# Patient Record
Sex: FEMALE | Race: WHITE | Employment: UNEMPLOYED | URBAN - METROPOLITAN AREA
[De-identification: names, ages, dates, MRNs, and addresses within clinical notes are randomized per-mention and may not be internally consistent; named-entity substitution may affect disease eponyms.]

---

## 2017-03-01 ENCOUNTER — OFFICE VISIT (OUTPATIENT)
Dept: BEHAVIORAL/MENTAL HEALTH CLINIC | Age: 22
End: 2017-03-01

## 2017-03-01 DIAGNOSIS — F39 MOOD DISORDER (HCC): ICD-10-CM

## 2017-03-01 DIAGNOSIS — F90.2 ATTENTION DEFICIT HYPERACTIVITY DISORDER (ADHD), COMBINED TYPE: Primary | ICD-10-CM

## 2017-03-01 DIAGNOSIS — F41.9 ANXIETY DISORDER, UNSPECIFIED TYPE: ICD-10-CM

## 2017-03-01 RX ORDER — FLUOXETINE HYDROCHLORIDE 20 MG/1
CAPSULE ORAL
Qty: 30 CAP | Refills: 2 | Status: SHIPPED | OUTPATIENT
Start: 2017-03-01 | End: 2017-06-02 | Stop reason: SDUPTHER

## 2017-03-01 RX ORDER — DEXTROAMPHETAMINE SACCHARATE, AMPHETAMINE ASPARTATE, DEXTROAMPHETAMINE SULFATE AND AMPHETAMINE SULFATE 7.5; 7.5; 7.5; 7.5 MG/1; MG/1; MG/1; MG/1
30 TABLET ORAL 2 TIMES DAILY
Qty: 60 TAB | Refills: 0 | Status: SHIPPED | OUTPATIENT
Start: 2017-04-30 | End: 2017-05-30

## 2017-03-01 RX ORDER — DEXTROAMPHETAMINE SACCHARATE, AMPHETAMINE ASPARTATE, DEXTROAMPHETAMINE SULFATE AND AMPHETAMINE SULFATE 7.5; 7.5; 7.5; 7.5 MG/1; MG/1; MG/1; MG/1
30 TABLET ORAL 2 TIMES DAILY
Qty: 60 TAB | Refills: 0 | Status: SHIPPED | OUTPATIENT
Start: 2017-03-31 | End: 2017-04-30

## 2017-03-01 RX ORDER — DEXTROAMPHETAMINE SACCHARATE, AMPHETAMINE ASPARTATE, DEXTROAMPHETAMINE SULFATE AND AMPHETAMINE SULFATE 7.5; 7.5; 7.5; 7.5 MG/1; MG/1; MG/1; MG/1
30 TABLET ORAL 2 TIMES DAILY
Qty: 60 TAB | Refills: 0 | Status: SHIPPED | OUTPATIENT
Start: 2017-03-01 | End: 2017-03-31

## 2017-03-01 RX ORDER — LORAZEPAM 0.5 MG/1
0.5 TABLET ORAL
Qty: 60 TAB | Refills: 3 | Status: SHIPPED | OUTPATIENT
Start: 2017-03-01 | End: 2017-03-31

## 2017-03-01 NOTE — MR AVS SNAPSHOT
Visit Information Date & Time Provider Department Dept. Phone Encounter #  
 3/1/2017  1:30 PM Deepa Vinson  [facility] [phone] [id number] Upcoming Health Maintenance Date Due  
 HPV AGE 9Y-34Y (1 of 3 - Female 3 Dose Series) 12/17/2004 Pneumococcal 19-64 Medium Risk (1 of 1 - PPSV23) 12/17/2012 DTaP/Tdap/Td series (1 - Tdap) 12/17/2014 PAP AKA CERVICAL CYTOLOGY 12/17/2014 INFLUENZA AGE 9 TO ADULT 8/1/2016 Allergies as of 3/1/2017  Review Complete On: 3/1/2017 By: Deepa Vinson MD  
  
 Severity Noted Reaction Type Reactions Hydrocodone  12/01/2016    Itching Current Immunizations  Reviewed on 6/11/2014 Name Date Influenza Vaccine 11/15/2013 Not reviewed this visit You Were Diagnosed With   
  
 Codes Comments Attention deficit hyperactivity disorder (ADHD), combined type    -  Primary ICD-10-CM: F90.2 ICD-9-CM: 314.01 Mood disorder ([facility]     ICD-10-CM: F39 
ICD-9-CM: 296.90 Anxiety disorder, unspecified type     ICD-10-CM: F41.9 ICD-9-CM: 300.00 Vitals OB Status Having regular periods Preferred Pharmacy Pharmacy Name Phone [facility] [address] [place] [address] [phone] Your Updated Medication List  
  
   
This list is accurate as of: 3/1/17  2:01 PM.  Always use your most recent med list.  
  
  
  
  
 * dextroamphetamine-amphetamine 30 mg tablet Commonly known as:  ADDERALL Take 1 Tab by mouth two (2) times a dayIndications: ATTENTION-DEFICIT HYPERACTIVITY DISORDER. Take second dose after lunch Max Daily Amount: 2 Tabs * dextroamphetamine-amphetamine 30 mg tablet Commonly known as:  ADDERALL Take 1 Tab by mouth two (2) times a dayIndications: ATTENTION-DEFICIT HYPERACTIVITY DISORDER Earliest Fill Date: 3/31/17. Take second dose after lunch Max Daily Amount: 2 Tabs Start taking on:  3/31/2017 * dextroamphetamine-amphetamine 30 mg tablet Commonly known as:  ADDERALL Take 1 Tab by mouth two (2) times a dayIndications: ATTENTION-DEFICIT HYPERACTIVITY DISORDER Earliest Fill Date: 4/30/17. Take second dose after lunch Max Daily Amount: 2 Tabs Start taking on:  4/30/2017 FISH OIL 1,000 mg Cap Generic drug:  omega-3 fatty acids-vitamin e Take 1 Cap by mouth. FLUoxetine 20 mg capsule Commonly known as:  PROzac TAKE ONE CAPSULE BY MOUTH EVERY DAY  Indications: ANXIETY WITH DEPRESSION IMPLANON 68 mg Impl Generic drug:  etonogestrel  
by SubDERmal route. LORazepam 0.5 mg tablet Commonly known as:  ATIVAN Take 1 Tab by mouth two (2) times daily as needed for Anxiety for up to 30 days. Max Daily Amount: 1 mg. Indications: ANXIETY  
  
 naproxen sodium 220 mg tablet Commonly known as:  NAPROSYN Take 220 mg by mouth two (2) times daily (with meals). * Notice: This list has 3 medication(s) that are the same as other medications prescribed for you. Read the directions carefully, and ask your doctor or other care provider to review them with you. Prescriptions Printed Refills FLUoxetine (PROZAC) 20 mg capsule 2 Sig: TAKE ONE CAPSULE BY MOUTH EVERY DAY  Indications: ANXIETY WITH DEPRESSION Class: Print LORazepam (ATIVAN) 0.5 mg tablet 3 Sig: Take 1 Tab by mouth two (2) times daily as needed for Anxiety for up to 30 days. Max Daily Amount: 1 mg. Indications: ANXIETY Class: Print Route: Oral  
 dextroamphetamine-amphetamine (ADDERALL) 30 mg tablet 0 Sig: Take 1 Tab by mouth two (2) times a dayIndications: ATTENTION-DEFICIT HYPERACTIVITY DISORDER. Take second dose after lunch Max Daily Amount: 2 Tabs Class: Print Route: Oral  
 dextroamphetamine-amphetamine (ADDERALL) 30 mg tablet 0 Starting on: 3/31/2017  Sig: Take 1 Tab by mouth two (2) times a dayIndications: ATTENTION-DEFICIT HYPERACTIVITY DISORDER Earliest Fill Date: 3/31/17. Take second dose after lunch Max Daily Amount: 2 Tabs Class: Print Route: Oral  
 dextroamphetamine-amphetamine (ADDERALL) 30 mg tablet 0 Starting on: 4/30/2017 Sig: Take 1 Tab by mouth two (2) times a dayIndications: ATTENTION-DEFICIT HYPERACTIVITY DISORDER Earliest Fill Date: 4/30/17. Take second dose after lunch Max Daily Amount: 2 Tabs Class: Print Route: Oral  
  
Introducing [facility] & [facility] SERVICES! Rolettemadison Farrob introduces [employer] patient portal. Now you can access parts of your medical record, email your doctor's office, and request medication refills online. 1. In your internet browser, go to [email or web address] 2. Click on the First Time User? Click Here link in the Sign In box. You will see the New Member Sign Up page. 3. Enter your [employer] Access Code exactly as it appears below. You will not need to use this code after youve completed the sign-up process. If you do not sign up before the expiration date, you must request a new code. · [employer] Access Code: [email or web address] Expires: 3/1/2017  2:37 PM 
 
4. Enter the last four digits of your Social Security Number (xxxx) and Date of Birth (mm/dd/yyyy) as indicated and click Submit. You will be taken to the next sign-up page. 5. Create a [employer] ID. This will be your [employer] login ID and cannot be changed, so think of one that is secure and easy to remember. 6. Create a [employer] password. You can change your password at any time. 7. Enter your Password Reset Question and Answer. This can be used at a later time if you forget your password. 8. Enter your e-mail address. You will receive e-mail notification when new information is available in [address] 9. Click Sign Up. You can now view and download portions of your medical record. 10. Click the Download Summary menu link to download a portable copy of your medical information. If you have questions, please visit the Frequently Asked Questions section of the [employer] website. Remember, [employer] is NOT to be used for urgent needs. For medical emergencies, dial 911. Now available from your iPhone and Android! Please provide this summary of care documentation to your next provider. Your primary care clinician is listed as Sarahi Peterson. If you have any questions after today's visit, please call [phone].

## 2017-03-01 NOTE — PROGRESS NOTES
Psychiatric Progress Note    Date: 3/1/2017  Account Number:  [id number]  Name: Naina Vizcaino    Length of psychotherapy session: 16 minutes     Total Patient Care Time Spent: 20 minutes : (Coordinated care:  counseling time with patient, individual psychotherapy with patient; discussions with family members and chart review). SUBJECTIVE:   Naina Vizcaino  is a 19 y.o.  female  patient presents for a therapy/psychopharmacological management appointment. Pt reports doing well. She reports moving into a rent to own house which she is currently renovating. 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 She reportedly painted the whole house and made some structural changes in the house as well. 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 Pt states that so far she is working alone but in future she planst to hire people to work for her. Financially she is getting stable. Provided support and encouragement to pt. Pt reports doing well on her current meds. She reportedly missed her Prozac for > a week, states she started to feel depressed, and restarted the medicine, now taking double the dose, recommended going back to her previous dose and reinforced compliance. Pt's daughter lives with her and spends a night with her dad and his family, pt still feels sad about the separation with her , states she loves him and wishes that he could take care of his mental illness so she could get back with him. Patient denies SI/HI/SIB. No evidence of AH/VH or delusions.       Appetite: improved  Sleep: no change     Response to Treatment: Positive   Side Effects: none      Supportive/Cognitive/Reality-Oriented psychotherapy provided in regards to psychosocial stressors:   pre-admission and current problems   Housing issues   Occupational issues   Academic issues   Legal issues   Medical issues   Interpersonal conflicts   Stress of hospitalization  Psychoeducation provided  Treatment plan reviewed with patient-including diagnosis and medications  Worked on issues of denial & effects of substance dependency/use      OBJECTIVE:                 Mental Status exam: WNL except for      Sensorium  oriented to time, place and person   Relations cooperative    Eye Contact    appropriate   Appearance:  age appropriate, well dressed and well groomed, bright affect   Motor Behavior:  within normal limits   Speech:  normal pitch and normal volume   Thought Process: organized and goal directed   Thought Content free of delusions and free of hallucinations   Suicidal ideations none   Homicidal ideations none   Mood:  Good    Affect:  Stable/bright/content    Memory recent  adequate   Memory remote:  adequate   Concentration:  adequate   Abstraction:  abstract   Insight:  fair   Reliability fair   Judgment:  fair       Allergies   Allergen Reactions    Hydrocodone Itching        Current Outpatient Prescriptions   Medication Sig Dispense Refill    FLUoxetine (PROZAC) 20 mg capsule TAKE ONE CAPSULE BY MOUTH EVERY DAY  Indications: ANXIETY WITH DEPRESSION 30 Cap 2    LORazepam (ATIVAN) 0.5 mg tablet Take 1 Tab by mouth two (2) times daily as needed for Anxiety for up to 30 days. Max Daily Amount: 1 mg. Indications: ANXIETY 60 Tab 3    dextroamphetamine-amphetamine (ADDERALL) 30 mg tablet Take 1 Tab by mouth two (2) times a dayIndications: ATTENTION-DEFICIT HYPERACTIVITY DISORDER. Take second dose after lunch Max Daily Amount: 2 Tabs 60 Tab 0    [START ON 3/31/2017] dextroamphetamine-amphetamine (ADDERALL) 30 mg tablet Take 1 Tab by mouth two (2) times a dayIndications: ATTENTION-DEFICIT HYPERACTIVITY DISORDER Earliest Fill Date: 3/31/17.   Take second dose after lunch Max Daily Amount: 2 Tabs 60 Tab 0    [START ON 4/30/2017] dextroamphetamine-amphetamine (ADDERALL) 30 mg tablet Take 1 Tab by mouth two (2) times a dayIndications: ATTENTION-DEFICIT HYPERACTIVITY DISORDER Earliest Fill Date: 4/30/17. Take second dose after lunch Max Daily Amount: 2 Tabs 60 Tab 0    naproxen sodium (NAPROSYN) 220 mg tablet Take 220 mg by mouth two (2) times daily (with meals).  omega-3 fatty acids-vitamin e (FISH OIL) 1,000 mg cap Take 1 Cap by mouth.  etonogestrel (IMPLANON) 68 mg impl by SubDERmal route. Medication Changes/Adjustments:   Medications Discontinued During This Encounter   Medication Reason    FLUoxetine (PROZAC) 20 mg capsule Reorder    LORazepam (ATIVAN) 0.5 mg tablet Reorder    dextroamphetamine-amphetamine (ADDERALL) 30 mg tablet Reorder          ASSESSMENT:  Nicole Bañuelos  is a 19 y.o.  female patient presented for her f/u appointment. 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 She did miss her Prozac for more than a week but restarted it as felt depressed. Reinforced med compliance. PSYCHOTHERAPY: Provided encouragement and supportive psychotherapy, discussed the importance of continuing healthy habits and medication compliance. Diagnoses:   Axis I: Mood disorder  ADHD  Anxiety disorder  Axis II: Deferred  Axis III: None reported  Axis IV: Moderate  Axis V:  75-80    RECOMMENDATIONS/PLAN:   1. Medications: Medications reviewed, continue with the current meds. Orders Placed This Encounter    FLUoxetine (PROZAC) 20 mg capsule    LORazepam (ATIVAN) 0.5 mg tablet    dextroamphetamine-amphetamine (ADDERALL) 30 mg tablet    dextroamphetamine-amphetamine (ADDERALL) 30 mg tablet    dextroamphetamine-amphetamine (ADDERALL) 30 mg tablet      2. Follow-up Disposition:  Return in about 3 months (around 6/1/2017).

## 2017-06-02 RX ORDER — LORAZEPAM 0.5 MG/1
TABLET ORAL
Refills: 3 | COMMUNITY
Start: 2017-05-09 | End: 2017-06-02 | Stop reason: SDUPTHER

## 2017-06-02 RX ORDER — FLUOXETINE HYDROCHLORIDE 20 MG/1
CAPSULE ORAL
Qty: 30 CAP | Refills: 0 | Status: SHIPPED | OUTPATIENT
Start: 2017-06-02 | End: 2017-07-13

## 2017-06-02 RX ORDER — DEXTROAMPHETAMINE SACCHARATE, AMPHETAMINE ASPARTATE, DEXTROAMPHETAMINE SULFATE AND AMPHETAMINE SULFATE 7.5; 7.5; 7.5; 7.5 MG/1; MG/1; MG/1; MG/1
30 TABLET ORAL 2 TIMES DAILY
Qty: 10 TAB | Refills: 0 | Status: SHIPPED | OUTPATIENT
Start: 2017-06-02 | End: 2017-07-13 | Stop reason: SDUPTHER

## 2017-06-02 RX ORDER — LORAZEPAM 0.5 MG/1
TABLET ORAL
Qty: 60 TAB | Refills: 0 | Status: SHIPPED | OUTPATIENT
Start: 2017-06-02 | End: 2017-07-13 | Stop reason: SDUPTHER

## 2017-07-13 ENCOUNTER — OFFICE VISIT (OUTPATIENT)
Dept: BEHAVIORAL/MENTAL HEALTH CLINIC | Age: 22
End: 2017-07-13

## 2017-07-13 DIAGNOSIS — F41.9 ANXIETY DISORDER, UNSPECIFIED TYPE: ICD-10-CM

## 2017-07-13 DIAGNOSIS — F90.2 ATTENTION DEFICIT HYPERACTIVITY DISORDER (ADHD), COMBINED TYPE: ICD-10-CM

## 2017-07-13 DIAGNOSIS — F39 MOOD DISORDER (HCC): Primary | ICD-10-CM

## 2017-07-13 RX ORDER — DEXTROAMPHETAMINE SACCHARATE, AMPHETAMINE ASPARTATE, DEXTROAMPHETAMINE SULFATE AND AMPHETAMINE SULFATE 7.5; 7.5; 7.5; 7.5 MG/1; MG/1; MG/1; MG/1
30 TABLET ORAL 2 TIMES DAILY
Qty: 60 TAB | Refills: 0 | Status: SHIPPED | OUTPATIENT
Start: 2017-09-11 | End: 2017-10-04 | Stop reason: SDUPTHER

## 2017-07-13 RX ORDER — DEXTROAMPHETAMINE SACCHARATE, AMPHETAMINE ASPARTATE, DEXTROAMPHETAMINE SULFATE AND AMPHETAMINE SULFATE 7.5; 7.5; 7.5; 7.5 MG/1; MG/1; MG/1; MG/1
30 TABLET ORAL 2 TIMES DAILY
Qty: 60 TAB | Refills: 0 | Status: SHIPPED | OUTPATIENT
Start: 2017-08-12 | End: 2017-09-11

## 2017-07-13 RX ORDER — LORAZEPAM 0.5 MG/1
TABLET ORAL
Qty: 60 TAB | Refills: 3 | Status: SHIPPED | OUTPATIENT
Start: 2017-07-13 | End: 2017-11-09 | Stop reason: SDUPTHER

## 2017-07-13 RX ORDER — DEXTROAMPHETAMINE SACCHARATE, AMPHETAMINE ASPARTATE, DEXTROAMPHETAMINE SULFATE AND AMPHETAMINE SULFATE 7.5; 7.5; 7.5; 7.5 MG/1; MG/1; MG/1; MG/1
30 TABLET ORAL 2 TIMES DAILY
Qty: 60 TAB | Refills: 0 | Status: SHIPPED | OUTPATIENT
Start: 2017-07-13 | End: 2017-08-12

## 2017-07-13 NOTE — MR AVS SNAPSHOT
Visit Information Date & Time Provider Department Dept. Phone Encounter #  
 7/13/2017  3:30 PM Weston Shannon MD [phone] [id number] Upcoming Health Maintenance Date Due  
 HPV AGE 9Y-34Y (1 of 3 - Female 3 Dose Series) 12/17/2004 Pneumococcal 19-64 Medium Risk (1 of 1 - PPSV23) 12/17/2012 DTaP/Tdap/Td series (1 - Tdap) 12/17/2014 PAP AKA CERVICAL CYTOLOGY 12/17/2014 INFLUENZA AGE 9 TO ADULT 8/1/2017 Allergies as of 7/13/2017  Review Complete On: 7/13/2017 By: Weston Shannon MD  
  
 Severity Noted Reaction Type Reactions Hydrocodone  12/01/2016    Itching Current Immunizations  Reviewed on 6/11/2014 Name Date Influenza Vaccine 11/15/2013 Not reviewed this visit You Were Diagnosed With   
  
 Codes Comments [facility] ([facility]    -  Primary ICD-10-CM: F39 
ICD-9-CM: 296.90 Attention deficit hyperactivity disorder (ADHD), combined type     ICD-10-CM: F90.2 ICD-9-CM: 314.01 Anxiety disorder, unspecified type     ICD-10-CM: F41.9 ICD-9-CM: 300.00 Vitals OB Status Smoking Status Having regular periods Current Some Day Smoker Preferred Pharmacy Pharmacy Name Phone [facility] [address] [place] [address], [address] [place] [address] [phone] Your Updated Medication List  
  
   
This list is accurate as of: 7/13/17  4:08 PM.  Always use your most recent med list.  
  
  
  
  
 * dextroamphetamine-amphetamine 30 mg tablet Commonly known as:  ADDERALL Take 1 Tab by mouth two (2) times a dayIndications: ATTENTION-DEFICIT HYPERACTIVITY DISORDER. Take second dose after lunch Max Daily Amount: 2 Tabs * dextroamphetamine-amphetamine 30 mg tablet Commonly known as:  ADDERALL Take 1 Tab by mouth two (2) times a dayIndications: ATTENTION-DEFICIT HYPERACTIVITY DISORDER Earliest Fill Date: 8/12/17.   Take second dose after lunch Max Daily Amount: 2 Tabs Start taking on:  8/12/2017 * dextroamphetamine-amphetamine 30 mg tablet Commonly known as:  ADDERALL Take 1 Tab by mouth two (2) times a dayIndications: ATTENTION-DEFICIT HYPERACTIVITY DISORDER Earliest Fill Date: 9/11/17. Take second dose after lunch Max Daily Amount: 2 Tabs Start taking on:  9/11/2017 FISH OIL 1,000 mg Cap Generic drug:  omega-3 fatty acids-vitamin e Take 1 Cap by mouth. IMPLANON 68 mg Impl Generic drug:  etonogestrel  
by SubDERmal route. LORazepam 0.5 mg tablet Commonly known as:  ATIVAN TK 1 T PO  BID PRF ANXIETY  Indications: anxiety disorder  
  
 naproxen sodium 220 mg tablet Commonly known as:  NAPROSYN Take 220 mg by mouth two (2) times daily (with meals). * Notice: This list has 3 medication(s) that are the same as other medications prescribed for you. Read the directions carefully, and ask your doctor or other care provider to review them with you. Prescriptions Printed Refills LORazepam (ATIVAN) 0.5 mg tablet 3 Sig: TK 1 T PO  BID PRF ANXIETY  Indications: anxiety disorder Class: Print  
 dextroamphetamine-amphetamine (ADDERALL) 30 mg tablet 0 Sig: Take 1 Tab by mouth two (2) times a dayIndications: ATTENTION-DEFICIT HYPERACTIVITY DISORDER. Take second dose after lunch Max Daily Amount: 2 Tabs Class: Print Route: Oral  
 dextroamphetamine-amphetamine (ADDERALL) 30 mg tablet 0 Starting on: 8/12/2017 Sig: Take 1 Tab by mouth two (2) times a dayIndications: ATTENTION-DEFICIT HYPERACTIVITY DISORDER Earliest Fill Date: 8/12/17. Take second dose after lunch Max Daily Amount: 2 Tabs Class: Print Route: Oral  
 dextroamphetamine-amphetamine (ADDERALL) 30 mg tablet 0 Starting on: 9/11/2017 Sig: Take 1 Tab by mouth two (2) times a dayIndications: ATTENTION-DEFICIT HYPERACTIVITY DISORDER Earliest Fill Date: 9/11/17. Take second dose after lunch Max Daily Amount: 2 Tabs Class: Print Route: Oral  
  
We Performed the Following TSH 3RD GENERATION [52030 CPT(R)] Introducing [facility] & [facility] SERVICES! Smiley Torres introduces [employer] patient portal. Now you can access parts of your medical record, email your doctor's office, and request medication refills online. 1. In your internet browser, go to [email or web address] 2. Click on the First Time User? Click Here link in the Sign In box. You will see the New Member Sign Up page. 3. Enter your [employer] Access Code exactly as it appears below. You will not need to use this code after youve completed the sign-up process. If you do not sign up before the expiration date, you must request a new code. · [employer] Access Code: [email or web address] Expires: 10/11/2017  4:00 PM 
 
4. Enter the last four digits of your Social Security Number (xxxx) and Date of Birth (mm/dd/yyyy) as indicated and click Submit. You will be taken to the next sign-up page. 5. Create a [employer] ID. This will be your [employer] login ID and cannot be changed, so think of one that is secure and easy to remember. 6. Create a [employer] password. You can change your password at any time. 7. Enter your Password Reset Question and Answer. This can be used at a later time if you forget your password. 8. Enter your e-mail address. You will receive e-mail notification when new information is available in [address] 9. Click Sign Up. You can now view and download portions of your medical record. 10. Click the Download Summary menu link to download a portable copy of your medical information. If you have questions, please visit the Frequently Asked Questions section of the [employer] website. Remember, [employer] is NOT to be used for urgent needs. For medical emergencies, dial 911. Now available from your iPhone and Android! Please provide this summary of care documentation to your next provider. Your primary care clinician is listed as Ronnald Merlin. If you have any questions after today's visit, please call [phone].

## 2017-07-16 NOTE — PROGRESS NOTES
Psychiatric Progress Note    Date: 7/13/2017  Account Number:  [id number]  Name: Yemi Awad    Length of psychotherapy session: 15 minutes     Total Patient Care Time Spent: 20 minutes : (Coordinated care:  counseling time with patient, individual psychotherapy with patient; discussions with family members and chart review). SUBJECTIVE:   Yemi Awad  is a 19 y.o.  female  patient presents for a therapy/psychopharmacological management appointment. Pt reports doing well for the last 4 weeks, states wonderful things are happening. 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 Also has worked hard on remodeling her own rental home. States she is going to get her [facility] license in few weeks. 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 She has gained a lot of wt since last seen, pt states that Thyroid disease runs in her family, will check TSH. Patient denies SI/HI/SIB. No evidence of AH/VH or delusions.       Appetite: improved  Sleep: no change     Response to Treatment: Positive   Side Effects: none      Supportive/Cognitive/Reality-Oriented psychotherapy provided in regards to psychosocial stressors:   pre-admission and current problems   Housing issues   Occupational issues   Academic issues   Legal issues   Medical issues   Interpersonal conflicts   Stress of hospitalization  Psychoeducation provided  Treatment plan reviewed with patient-including diagnosis and medications  Worked on issues of denial & effects of substance dependency/use      OBJECTIVE:                 Mental Status exam: WNL except for      Sensorium  oriented to time, place and person   Relations cooperative    Eye Contact    appropriate   Appearance:  age appropriate, well dressed and well groomed, bright affect   Motor Behavior:  within normal limits   Speech:  normal pitch and normal volume   Thought Process: organized and goal directed   Thought Content free of delusions and free of hallucinations   Suicidal ideations none   Homicidal ideations none   Mood:  Good    Affect:  Stable/tired    Memory recent  adequate   Memory remote:  adequate   Concentration:  adequate   Abstraction:  abstract   Insight:  good   Reliability good   Judgment:  good       Allergies   Allergen Reactions    Hydrocodone Itching        Current Outpatient Prescriptions   Medication Sig Dispense Refill    LORazepam (ATIVAN) 0.5 mg tablet TK 1 T PO  BID PRF ANXIETY  Indications: anxiety disorder 60 Tab 3    dextroamphetamine-amphetamine (ADDERALL) 30 mg tablet Take 1 Tab by mouth two (2) times a dayIndications: ATTENTION-DEFICIT HYPERACTIVITY DISORDER. Take second dose after lunch Max Daily Amount: 2 Tabs 60 Tab 0    [START ON 8/12/2017] dextroamphetamine-amphetamine (ADDERALL) 30 mg tablet Take 1 Tab by mouth two (2) times a dayIndications: ATTENTION-DEFICIT HYPERACTIVITY DISORDER Earliest Fill Date: 8/12/17. Take second dose after lunch Max Daily Amount: 2 Tabs 60 Tab 0    [START ON 9/11/2017] dextroamphetamine-amphetamine (ADDERALL) 30 mg tablet Take 1 Tab by mouth two (2) times a dayIndications: ATTENTION-DEFICIT HYPERACTIVITY DISORDER Earliest Fill Date: 9/11/17. Take second dose after lunch Max Daily Amount: 2 Tabs 60 Tab 0    naproxen sodium (NAPROSYN) 220 mg tablet Take 220 mg by mouth two (2) times daily (with meals).  omega-3 fatty acids-vitamin e (FISH OIL) 1,000 mg cap Take 1 Cap by mouth.  etonogestrel (IMPLANON) 68 mg impl by SubDERmal route. Medication Changes/Adjustments:   Medications Discontinued During This Encounter   Medication Reason    LORazepam (ATIVAN) 0.5 mg tablet Reorder    dextroamphetamine-amphetamine (ADDERALL) 30 mg tablet Reorder    FLUoxetine (PROZAC) 20 mg capsule Not A Current Medication          ASSESSMENT:  Christina Reich  is a 19 y.o.  female patient presented for her f/u appointment.  Pt is working hard towards her dream of starting her own business, provided support and encouragement. Diagnoses:   Axis I: Mood disorder  ADHD  Anxiety disorder  Axis II: Deferred  Axis III: None reported  Axis IV: Moderate  Axis V:  71-80    RECOMMENDATIONS/PLAN:   1. Medications: Medications reviewed, continue with the current meds. Orders Placed This Encounter    TSH, 3RD GENERATIONMAGNUS ONLY    LORazepam (ATIVAN) 0.5 mg tablet    dextroamphetamine-amphetamine (ADDERALL) 30 mg tablet    dextroamphetamine-amphetamine (ADDERALL) 30 mg tablet    dextroamphetamine-amphetamine (ADDERALL) 30 mg tablet      2. Follow-up Disposition:  Return in about 3 months (around 10/13/2017).

## 2017-10-04 ENCOUNTER — OFFICE VISIT (OUTPATIENT)
Dept: BEHAVIORAL/MENTAL HEALTH CLINIC | Age: 22
End: 2017-10-04

## 2017-10-04 VITALS
SYSTOLIC BLOOD PRESSURE: 121 MMHG | WEIGHT: 203 LBS | DIASTOLIC BLOOD PRESSURE: 83 MMHG | BODY MASS INDEX: 32.62 KG/M2 | HEIGHT: 66 IN | HEART RATE: 86 BPM

## 2017-10-04 DIAGNOSIS — F90.2 ATTENTION DEFICIT HYPERACTIVITY DISORDER (ADHD), COMBINED TYPE: Primary | ICD-10-CM

## 2017-10-04 RX ORDER — DEXTROAMPHETAMINE SACCHARATE, AMPHETAMINE ASPARTATE, DEXTROAMPHETAMINE SULFATE AND AMPHETAMINE SULFATE 7.5; 7.5; 7.5; 7.5 MG/1; MG/1; MG/1; MG/1
30 TABLET ORAL 2 TIMES DAILY
Qty: 60 TAB | Refills: 0 | Status: SHIPPED | OUTPATIENT
Start: 2017-10-04 | End: 2017-11-03

## 2017-10-04 NOTE — PROGRESS NOTES
Psychiatric Progress Note    Date: 10/4/2017  Account Number:  [id number]  Name: Yeni Brantley    Length of psychotherapy session: 15 minutes     Total Patient Care Time Spent: 20 minutes : (Coordinated care:  counseling time with patient, individual psychotherapy with patient; discussions with family members and chart review). SUBJECTIVE:   Yeni Brantley  is a 19 y.o.  female  patient presents for a therapy/psychopharmacological management appointment. Pt reports doing fine, she is seldom taking anxiety medicine, states the dose of Adderall seems to be low as the effects wear off early. Pt has gained significant amount of wt. She is not sure why. In her last visit she was given a lab form for TSH, which she did not get done. Has an upcoming appointment with family physician, in which recommended pt to get all of her blood work done. 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 States she had been financially under restraints recently. Stress may be related to her wt gain but need to r/o any other medical problems, of note pt has family h/o Thyroid disease. Patient denies SI/HI/SIB. No evidence of AH/VH or delusions.       Appetite: no change   Sleep: no change     Response to Treatment: Positive   Side Effects: none      Supportive/Cognitive/Reality-Oriented psychotherapy provided in regards to psychosocial stressors:   pre-admission and current problems   Housing issues   Occupational issues   Academic issues   Legal issues   Medical issues   Interpersonal conflicts   Stress of hospitalization  Psychoeducation provided  Treatment plan reviewed with patient-including diagnosis and medications  Worked on issues of denial & effects of substance dependency/use      OBJECTIVE:                 Mental Status exam: WNL except for      Sensorium  oriented to time, place and person   Relations cooperative    Eye Contact    appropriate   Appearance:  age appropriate, appropriately dressed and groomed, obese Motor Behavior:  within normal limits   Speech:  normal pitch and normal volume   Thought Process: organized and goal directed   Thought Content free of delusions and free of hallucinations   Suicidal ideations none   Homicidal ideations none   Mood:  stable   Affect:  Stable/tired    Memory recent  adequate   Memory remote:  adequate   Concentration:  adequate   Abstraction:  abstract   Insight:  good   Reliability good   Judgment:  good       Allergies   Allergen Reactions    Hydrocodone Itching        Current Outpatient Prescriptions   Medication Sig Dispense Refill    dextroamphetamine-amphetamine (ADDERALL) 30 mg tablet Take 1 Tab by mouth two (2) times a dayIndications: ATTENTION-DEFICIT HYPERACTIVITY DISORDER. Take second dose after lunch Max Daily Amount: 2 Tabs 60 Tab 0    LORazepam (ATIVAN) 0.5 mg tablet TK 1 T PO  BID PRF ANXIETY  Indications: anxiety disorder 60 Tab 3    naproxen sodium (NAPROSYN) 220 mg tablet Take 220 mg by mouth two (2) times daily (with meals).  omega-3 fatty acids-vitamin e (FISH OIL) 1,000 mg cap Take 1 Cap by mouth.  etonogestrel (IMPLANON) 68 mg impl by SubDERmal route. Medication Changes/Adjustments:   Medications Discontinued During This Encounter   Medication Reason    dextroamphetamine-amphetamine (ADDERALL) 30 mg tablet Reorder          ASSESSMENT:  Miguel Wilkinson  is a 19 y.o.  female patient presented for her f/u appointment. Pt seems tired and down, denied feeling depressed though, has gained significant amount of wt. Feels that the current dose of Adderall is not enough, recommended medication change as pt is already on high dose of Adderall. Diagnoses:   Axis I: Mood disorder  ADHD  Anxiety disorder  Axis II: Deferred  Axis III: None reported  Axis IV: Moderate  Axis V:  65-75    RECOMMENDATIONS/PLAN:   1. Medications: Medications reviewed, continue Adderall for now, pt will think about changing the medicine.    Orders Placed This Encounter  dextroamphetamine-amphetamine (ADDERALL) 30 mg tablet      2. Follow-up Disposition:  Return in about 1 month (around 11/4/2017).

## 2017-10-04 NOTE — MR AVS SNAPSHOT
Visit Information Date & Time Provider Department Dept. Phone Encounter #  
 10/4/2017  1:45 PM Yanet Alcantara MD [address] [facility] [phone] [id number] Follow-up Instructions Return in about 1 month (around 11/4/2017). Upcoming Health Maintenance Date Due  
 HPV AGE 9Y-34Y (1 of 3 - Female 3 Dose Series) 12/17/2004 Pneumococcal 19-64 Medium Risk (1 of 1 - PPSV23) 12/17/2012 DTaP/Tdap/Td series (1 - Tdap) 12/17/2014 PAP AKA CERVICAL CYTOLOGY 12/17/2014 INFLUENZA AGE 9 TO ADULT 8/1/2017 Allergies as of 10/4/2017  Review Complete On: 10/4/2017 By: Yanet Alcantara MD  
  
 Severity Noted Reaction Type Reactions Hydrocodone  12/01/2016    Itching Current Immunizations  Reviewed on 6/11/2014 Name Date Influenza Vaccine 11/15/2013 Not reviewed this visit You Were Diagnosed With   
  
 Codes Comments Attention deficit hyperactivity disorder (ADHD), combined type    -  Primary ICD-10-CM: F90.2 ICD-9-CM: 314.01 Vitals BP Pulse Height(growth percentile) Weight(growth percentile) LMP BMI  
 121/83 86 5' 6\" (1.676 m) 203 lb (92.1 kg) 09/26/2017 32.77 kg/m2 OB Status Smoking Status Having regular periods Current Some Day Smoker Vitals History BMI and BSA Data Body Mass Index Body Surface Area 32.77 kg/m 2 2.07 m 2 Preferred Pharmacy Pharmacy Name Phone [facility] [address] [place] [address] [phone] Your Updated Medication List  
  
   
This list is accurate as of: 10/4/17  3:07 PM.  Always use your most recent med list.  
  
  
  
  
 dextroamphetamine-amphetamine 30 mg tablet Commonly known as:  ADDERALL Take 1 Tab by mouth two (2) times a dayIndications: ATTENTION-DEFICIT HYPERACTIVITY DISORDER. Take second dose after lunch Max Daily Amount: 2 Tabs FISH OIL 1,000 mg Cap Generic drug:  omega-3 fatty acids-vitamin e Take 1 Cap by mouth. IMPLANON 68 mg Impl Generic drug:  etonogestrel  
by SubDERmal route. LORazepam 0.5 mg tablet Commonly known as:  ATIVAN TK 1 T PO  BID PRF ANXIETY  Indications: anxiety disorder  
  
 naproxen sodium 220 mg tablet Commonly known as:  NAPROSYN Take 220 mg by mouth two (2) times daily (with meals). Prescriptions Printed Refills  
 dextroamphetamine-amphetamine (ADDERALL) 30 mg tablet 0 Sig: Take 1 Tab by mouth two (2) times a dayIndications: ATTENTION-DEFICIT HYPERACTIVITY DISORDER. Take second dose after lunch Max Daily Amount: 2 Tabs Class: Print Route: Oral  
  
Follow-up Instructions Return in about 1 month (around 11/4/2017). Introducing [facility] & HE[facility]! Anu Aguilar introduces [employer] patient portal. Now you can access parts of your medical record, email your doctor's office, and request medication refills online. 1. In your internet browser, go to [email or web address] 2. Click on the First Time User? Click Here link in the Sign In box. You will see the New Member Sign Up page. 3. Enter your [employer] Access Code exactly as it appears below. You will not need to use this code after youve completed the sign-up process. If you do not sign up before the expiration date, you must request a new code. · [employer] Access Code: [email or web address] Expires: 10/11/2017  4:00 PM 
 
4. Enter the last four digits of your Social Security Number (xxxx) and Date of Birth (mm/dd/yyyy) as indicated and click Submit. You will be taken to the next sign-up page. 5. Create a [employer] ID. This will be your [employer] login ID and cannot be changed, so think of one that is secure and easy to remember. 6. Create a [employer] password. You can change your password at any time. 7. Enter your Password Reset Question and Answer. This can be used at a later time if you forget your password. 8. Enter your e-mail address. You will receive e-mail notification when new information is available in [address] 9. Click Sign Up. You can now view and download portions of your medical record. 10. Click the Download Summary menu link to download a portable copy of your medical information. If you have questions, please visit the Frequently Asked Questions section of the [employer] website. Remember, [employer] is NOT to be used for urgent needs. For medical emergencies, dial 911. Now available from your iPhone and Android! Please provide this summary of care documentation to your next provider. Your primary care clinician is listed as Loki Berry. If you have any questions after today's visit, please call [phone].

## 2017-11-09 ENCOUNTER — OFFICE VISIT (OUTPATIENT)
Dept: BEHAVIORAL/MENTAL HEALTH CLINIC | Age: 22
End: 2017-11-09

## 2017-11-09 VITALS
BODY MASS INDEX: 31.98 KG/M2 | HEIGHT: 66 IN | DIASTOLIC BLOOD PRESSURE: 87 MMHG | SYSTOLIC BLOOD PRESSURE: 119 MMHG | HEART RATE: 124 BPM | WEIGHT: 199 LBS

## 2017-11-09 DIAGNOSIS — F90.2 ATTENTION DEFICIT HYPERACTIVITY DISORDER (ADHD), COMBINED TYPE: Primary | ICD-10-CM

## 2017-11-09 DIAGNOSIS — F39 MOOD DISORDER (HCC): ICD-10-CM

## 2017-11-09 DIAGNOSIS — F41.9 ANXIETY DISORDER, UNSPECIFIED TYPE: ICD-10-CM

## 2017-11-09 RX ORDER — DEXTROAMPHETAMINE SACCHARATE, AMPHETAMINE ASPARTATE, DEXTROAMPHETAMINE SULFATE AND AMPHETAMINE SULFATE 7.5; 7.5; 7.5; 7.5 MG/1; MG/1; MG/1; MG/1
30 TABLET ORAL 2 TIMES DAILY
COMMUNITY
End: 2017-11-09 | Stop reason: SDUPTHER

## 2017-11-09 RX ORDER — DEXTROAMPHETAMINE SACCHARATE, AMPHETAMINE ASPARTATE, DEXTROAMPHETAMINE SULFATE AND AMPHETAMINE SULFATE 7.5; 7.5; 7.5; 7.5 MG/1; MG/1; MG/1; MG/1
30 TABLET ORAL 2 TIMES DAILY
Qty: 60 TAB | Refills: 0 | Status: SHIPPED | OUTPATIENT
Start: 2017-12-09 | End: 2018-01-08

## 2017-11-09 RX ORDER — FLUOXETINE 10 MG/1
10 CAPSULE ORAL DAILY
Qty: 30 CAP | Refills: 2 | Status: SHIPPED | OUTPATIENT
Start: 2017-11-09 | End: 2018-01-19 | Stop reason: DRUGHIGH

## 2017-11-09 RX ORDER — DEXTROAMPHETAMINE SACCHARATE, AMPHETAMINE ASPARTATE, DEXTROAMPHETAMINE SULFATE AND AMPHETAMINE SULFATE 7.5; 7.5; 7.5; 7.5 MG/1; MG/1; MG/1; MG/1
30 TABLET ORAL 2 TIMES DAILY
Qty: 60 TAB | Refills: 0 | Status: SHIPPED | OUTPATIENT
Start: 2017-11-09 | End: 2017-11-14 | Stop reason: SDUPTHER

## 2017-11-09 RX ORDER — LORAZEPAM 0.5 MG/1
TABLET ORAL
Qty: 60 TAB | Refills: 3 | Status: SHIPPED | OUTPATIENT
Start: 2017-11-09 | End: 2018-01-19 | Stop reason: SDUPTHER

## 2017-11-09 NOTE — MR AVS SNAPSHOT
Visit Information Date & Time Provider Department Dept. Phone Encounter #  
 11/9/2017  1:00 PM Silvio Dutta MD [address] [phone] [id number] Follow-up Instructions Return in about 2 months (around 1/9/2018). Upcoming Health Maintenance Date Due  
 HPV AGE 9Y-34Y (1 of 3 - Female 3 Dose Series) 12/17/2004 Pneumococcal 19-64 Medium Risk (1 of 1 - PPSV23) 12/17/2012 DTaP/Tdap/Td series (1 - Tdap) 12/17/2014 PAP AKA CERVICAL CYTOLOGY 12/17/2014 Influenza Age 5 to Adult 8/1/2017 Allergies as of 11/9/2017  Review Complete On: 11/9/2017 By: Silvio Dutta MD  
  
 Severity Noted Reaction Type Reactions Hydrocodone  12/01/2016    Itching Current Immunizations  Reviewed on 6/11/2014 Name Date Influenza Vaccine 11/15/2013 Not reviewed this visit You Were Diagnosed With   
  
 Codes Comments Attention deficit hyperactivity disorder (ADHD), combined type    -  Primary ICD-10-CM: F90.2 ICD-9-CM: 314.01 [facility] ([facility]     ICD-10-CM: F39 
ICD-9-CM: 296.90 Anxiety disorder, unspecified type     ICD-10-CM: F41.9 ICD-9-CM: 300.00 Vitals BP Pulse Height(growth percentile) Weight(growth percentile) LMP BMI  
 119/87 (!) 124 5' 6\" (1.676 m) 199 lb (90.3 kg) 11/01/2017 32.12 kg/m2 OB Status Smoking Status Having regular periods Current Some Day Smoker Vitals History BMI and BSA Data Body Mass Index Body Surface Area  
 32.12 kg/m 2 2.05 m 2 Preferred Pharmacy Pharmacy Name Phone [facility] [address] [place] [address], [address] [place] [address] [phone] Your Updated Medication List  
  
   
This list is accurate as of: 11/9/17  1:57 PM.  Always use your most recent med list.  
  
  
  
  
 * dextroamphetamine-amphetamine 30 mg tablet Commonly known as:  ADDERALL Take 1 Tab by mouth two (2) times a dayIndications: Attention-Deficit Hyperactivity Disorder. Take second dose after lunch Max Daily Amount: 2 Tabs * dextroamphetamine-amphetamine 30 mg tablet Commonly known as:  ADDERALL Take 1 Tab by mouth two (2) times a dayIndications: Attention-Deficit Hyperactivity Disorder Earliest Fill Date: 12/9/17. Take second dose after lunch Max Daily Amount: 2 Tabs Start taking on:  12/9/2017 FISH OIL 1,000 mg Cap Generic drug:  omega-3 fatty acids-vitamin e Take 1 Cap by mouth. FLUoxetine 10 mg capsule Commonly known as:  PROzac Take 1 Cap by mouth daily. Indications: ANXIETY WITH DEPRESSION IMPLANON 68 mg Impl Generic drug:  etonogestrel  
by SubDERmal route. LORazepam 0.5 mg tablet Commonly known as:  ATIVAN TK 1 T PO  BID PRF ANXIETY  Indications: anxiety disorder  
  
 naproxen sodium 220 mg tablet Commonly known as:  NAPROSYN Take 220 mg by mouth two (2) times daily (with meals). * Notice: This list has 2 medication(s) that are the same as other medications prescribed for you. Read the directions carefully, and ask your doctor or other care provider to review them with you. Prescriptions Printed Refills LORazepam (ATIVAN) 0.5 mg tablet 3 Sig: TK 1 T PO  BID PRF ANXIETY  Indications: anxiety disorder Class: Print  
 dextroamphetamine-amphetamine (ADDERALL) 30 mg tablet 0 Sig: Take 1 Tab by mouth two (2) times a dayIndications: Attention-Deficit Hyperactivity Disorder. Take second dose after lunch Max Daily Amount: 2 Tabs Class: Print Route: Oral  
 dextroamphetamine-amphetamine (ADDERALL) 30 mg tablet 0 Starting on: 12/9/2017 Sig: Take 1 Tab by mouth two (2) times a dayIndications: Attention-Deficit Hyperactivity Disorder Earliest Fill Date: 12/9/17. Take second dose after lunch Max Daily Amount: 2 Tabs Class: Print  Route: Oral  
 FLUoxetine (PROZAC) 10 mg capsule 2  
 Sig: Take 1 Cap by mouth daily. Indications: ANXIETY WITH DEPRESSION Class: Print Route: Oral  
  
We Performed the Following TSH 3RD GENERATION [20711 CPT(R)] Follow-up Instructions Return in about 2 months (around 1/9/2018). Introducing [facility]! Romayne Duster introduces [employer] patient portal. Now you can access parts of your medical record, email your doctor's office, and request medication refills online. 1. In your internet browser, go to [email or web address]. [email or web address] 2. Click on the First Time User? Click Here link in the Sign In box. You will see the New Member Sign Up page. 3. Enter your [employer] Access Code exactly as it appears below. You will not need to use this code after youve completed the sign-up process. If you do not sign up before the expiration date, you must request a new code. · [employer] Access Code: [email or web address] Expires: 2/7/2018  1:46 PM 
 
4. Enter the last four digits of your Social Security Number (xxxx) and Date of Birth (mm/dd/yyyy) as indicated and click Submit. You will be taken to the next sign-up page. 5. Create a [employer] ID. This will be your [employer] login ID and cannot be changed, so think of one that is secure and easy to remember. 6. Create a [employer] password. You can change your password at any time. 7. Enter your Password Reset Question and Answer. This can be used at a later time if you forget your password. 8. Enter your e-mail address. You will receive e-mail notification when new information is available in [address] 9. Click Sign Up. You can now view and download portions of your medical record. 10. Click the Download Summary menu link to download a portable copy of your medical information. If you have questions, please visit the Frequently Asked Questions section of the [employer] website. Remember, [employer] is NOT to be used for urgent needs. For medical emergencies, dial 911. Now available from your iPhone and Android! Please provide this summary of care documentation to your next provider. Your primary care clinician is listed as Jennifer Love. If you have any questions after today's visit, please call [phone].

## 2017-11-11 NOTE — PROGRESS NOTES
Psychiatric Progress Note    Date: 10/9/2017  Account Number:  [id number]  Name: Bossman Galloway    Length of psychotherapy session: 15 minutes     Total Patient Care Time Spent: 20 minutes : (Coordinated care:  counseling time with patient, individual psychotherapy with patient; discussions with family members and chart review). SUBJECTIVE:   Bossman Galloway  is a 19 y.o.  female  patient presents for a therapy/psychopharmacological management appointment. Pt reports \"things are getting better\". 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 Does feel that she was doing well on Prozac in the past which she stopped due to the wt gain. Since she stopped the medicine, she has only lost 5 lbs which is negligible as compared to her wt gain, discussed with pt that the wt gain does not seem to be corelated to the medicine. She would need to get her TSH checked and monitor her diet. Pt agreed. Patient denies SI/HI/SIB. No evidence of AH/VH or delusions.       Appetite: no change/wt gain    Sleep: no change     Response to Treatment: Positive   Side Effects: none      Supportive/Cognitive/Reality-Oriented psychotherapy provided in regards to psychosocial stressors:   pre-admission and current problems   Housing issues   Occupational issues   Academic issues   Legal issues   Medical issues   Interpersonal conflicts   Stress of hospitalization  Psychoeducation provided  Treatment plan reviewed with patient-including diagnosis and medications  Worked on issues of denial & effects of substance dependency/use      OBJECTIVE:                 Mental Status exam: WNL except for      Sensorium  oriented to time, place and person   Relations cooperative    Eye Contact    appropriate   Appearance:  age appropriate, appropriately dressed and groomed, obese   Motor Behavior:  within normal limits   Speech:  normal pitch and normal volume   Thought Process: organized and goal directed   Thought Content free of delusions and free of hallucinations   Suicidal ideations none   Homicidal ideations none   Mood:  Some depression and anxiety    Affect:  Stable   Memory recent  adequate   Memory remote:  adequate   Concentration:  adequate   Abstraction:  abstract   Insight:  good   Reliability good   Judgment:  good       Allergies   Allergen Reactions    Hydrocodone Itching        Current Outpatient Prescriptions   Medication Sig Dispense Refill    LORazepam (ATIVAN) 0.5 mg tablet TK 1 T PO  BID PRF ANXIETY  Indications: anxiety disorder 60 Tab 3    dextroamphetamine-amphetamine (ADDERALL) 30 mg tablet Take 1 Tab by mouth two (2) times a dayIndications: Attention-Deficit Hyperactivity Disorder. Take second dose after lunch Max Daily Amount: 2 Tabs 60 Tab 0    [START ON 12/9/2017] dextroamphetamine-amphetamine (ADDERALL) 30 mg tablet Take 1 Tab by mouth two (2) times a dayIndications: Attention-Deficit Hyperactivity Disorder Earliest Fill Date: 12/9/17. Take second dose after lunch Max Daily Amount: 2 Tabs 60 Tab 0    FLUoxetine (PROZAC) 10 mg capsule Take 1 Cap by mouth daily. Indications: ANXIETY WITH DEPRESSION 30 Cap 2    naproxen sodium (NAPROSYN) 220 mg tablet Take 220 mg by mouth two (2) times daily (with meals).  omega-3 fatty acids-vitamin e (FISH OIL) 1,000 mg cap Take 1 Cap by mouth.  etonogestrel (IMPLANON) 68 mg impl by SubDERmal route. Medication Changes/Adjustments:   Medications Discontinued During This Encounter   Medication Reason    LORazepam (ATIVAN) 0.5 mg tablet Reorder    dextroamphetamine-amphetamine (ADDERALL) 30 mg tablet Reorder          ASSESSMENT:  Sekou You  is a 19 y.o.  female patient presented for her f/u appointment. Pt is reportedly doing better, wants to go back to Prozac as she thinks that the symptoms of tiredness and wt gain is related to her underlying depression which she has had hard time acknowledging. Doing well on Adderall.      Diagnoses:   Axis I: Mood disorder  ADHD  Anxiety disorder  Axis II: Deferred  Axis III: None reported  Axis IV: Moderate  Axis V:  65-75    RECOMMENDATIONS/PLAN:   1. Medications: Medications reviewed, start pt on Prozac 10mg daily, continue rest of the meds as such. Orders Placed This Encounter    TSH, 3RD GENERATIONMAGNUS    DISCONTD: dextroamphetamine-amphetamine (ADDERALL) 30 mg tablet    LORazepam (ATIVAN) 0.5 mg tablet    dextroamphetamine-amphetamine (ADDERALL) 30 mg tablet    dextroamphetamine-amphetamine (ADDERALL) 30 mg tablet    FLUoxetine (PROZAC) 10 mg capsule      2. Follow-up Disposition:  Return in about 2 months (around 1/9/2018).

## 2017-11-13 ENCOUNTER — TELEPHONE (OUTPATIENT)
Dept: BEHAVIORAL/MENTAL HEALTH CLINIC | Age: 22
End: 2017-11-13
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
## 2017-11-14 RX ORDER — DEXTROAMPHETAMINE SACCHARATE, AMPHETAMINE ASPARTATE, DEXTROAMPHETAMINE SULFATE AND AMPHETAMINE SULFATE 7.5; 7.5; 7.5; 7.5 MG/1; MG/1; MG/1; MG/1
30 TABLET ORAL 2 TIMES DAILY
Qty: 50 TAB | Refills: 0 | Status: SHIPPED | OUTPATIENT
Start: 2017-11-14 | End: 2018-03-27 | Stop reason: SDUPTHER

## 2017-11-16 ENCOUNTER — TELEPHONE (OUTPATIENT)
Dept: BEHAVIORAL/MENTAL HEALTH CLINIC | Age: 22
End: 2017-11-16

## 2017-11-16 NOTE — TELEPHONE ENCOUNTER
Pharmacist called wanting to know if we were okaying an early refill for the pt. I told her we were she states this will be the last time they will refill her medication as they did an early refill for her Adderall back in September.

## 2018-01-17 ENCOUNTER — HOSPITAL ENCOUNTER (EMERGENCY)
Age: 23
Discharge: HOME | End: 2018-01-17
Attending: EMERGENCY MEDICINE
Payer: COMMERCIAL

## 2018-01-17 VITALS
DIASTOLIC BLOOD PRESSURE: 90 MMHG | BODY MASS INDEX: 31.39 KG/M2 | SYSTOLIC BLOOD PRESSURE: 160 MMHG | HEIGHT: 67 IN | RESPIRATION RATE: 18 BRPM | TEMPERATURE: 98 F | OXYGEN SATURATION: 99 % | HEART RATE: 98 BPM | WEIGHT: 200 LBS

## 2018-01-17 DIAGNOSIS — R11.2 NAUSEA AND VOMITING, INTRACTABILITY OF VOMITING NOT SPECIFIED, UNSPECIFIED VOMITING TYPE: Primary | ICD-10-CM

## 2018-01-17 LAB
APPEARANCE UR: CLEAR
BACTERIA URNS QL MICRO: NEGATIVE /HPF
BILIRUB UR QL: NEGATIVE
COLOR UR: NORMAL
EPITH CASTS URNS QL MICRO: NORMAL /LPF
GLUCOSE UR STRIP.AUTO-MCNC: NEGATIVE MG/DL
HCG UR QL: NEGATIVE
HGB UR QL STRIP: NEGATIVE
KETONES UR QL STRIP.AUTO: NEGATIVE MG/DL
LEUKOCYTE ESTERASE UR QL STRIP.AUTO: NEGATIVE
NITRITE UR QL STRIP.AUTO: NEGATIVE
PH UR STRIP: 7.5 [PH] (ref 5–8)
PROT UR STRIP-MCNC: NEGATIVE MG/DL
RBC #/AREA URNS HPF: NORMAL /HPF (ref 0–5)
SP GR UR REFRACTOMETRY: 1.02 (ref 1–1.03)
UR CULT HOLD, URHOLD: NORMAL
UROBILINOGEN UR QL STRIP.AUTO: 1 EU/DL (ref 0.2–1)
WBC URNS QL MICRO: NORMAL /HPF (ref 0–4)

## 2018-01-17 PROCEDURE — 99283 EMERGENCY DEPT VISIT LOW MDM: CPT

## 2018-01-17 PROCEDURE — 81001 URINALYSIS AUTO W/SCOPE: CPT | Performed by: EMERGENCY MEDICINE

## 2018-01-17 PROCEDURE — 74011250637 HC RX REV CODE- 250/637: Performed by: EMERGENCY MEDICINE

## 2018-01-17 PROCEDURE — 81025 URINE PREGNANCY TEST: CPT

## 2018-01-17 RX ORDER — ONDANSETRON 4 MG/1
4 TABLET, ORALLY DISINTEGRATING ORAL
Status: COMPLETED | OUTPATIENT
Start: 2018-01-17 | End: 2018-01-17

## 2018-01-17 RX ORDER — ONDANSETRON 4 MG/1
4 TABLET, ORALLY DISINTEGRATING ORAL
Qty: 20 TAB | Refills: 0 | Status: ON HOLD | OUTPATIENT
Start: 2018-01-17 | End: 2020-01-13

## 2018-01-17 RX ADMIN — ONDANSETRON 4 MG: 4 TABLET, ORALLY DISINTEGRATING ORAL at 22:41

## 2018-01-18 NOTE — ED TRIAGE NOTES
Patient has been nauseous for a couple of days and started vomiting today. Denies fever or diarrhea. Patient states that she vomited once today.

## 2018-01-18 NOTE — ED NOTES
No episodes of vomiting since Zofran given. Discharge instructions given by provider. Pt amb at d/c. No distress.
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
## 2018-01-18 NOTE — DISCHARGE INSTRUCTIONS
Nausea and Vomiting: Care Instructions  Your Care Instructions    When you are nauseated, you may feel weak and sweaty and notice a lot of saliva in your mouth. Nausea often leads to vomiting. Most of the time you do not need to worry about nausea and vomiting, but they can be signs of other illnesses. Two common causes of nausea and vomiting are stomach flu and food poisoning. Nausea and vomiting from viral stomach flu will usually start to improve within 24 hours. Nausea and vomiting from food poisoning may last from 12 to 48 hours. The doctor has checked you carefully, but problems can develop later. If you notice any problems or new symptoms, get medical treatment right away. Follow-up care is a key part of your treatment and safety. Be sure to make and go to all appointments, and call your doctor if you are having problems. It's also a good idea to know your test results and keep a list of the medicines you take. How can you care for yourself at home? · To prevent dehydration, drink plenty of fluids, enough so that your urine is light yellow or clear like water. Choose water and other caffeine-free clear liquids until you feel better. If you have kidney, heart, or liver disease and have to limit fluids, talk with your doctor before you increase the amount of fluids you drink. · Rest in bed until you feel better. · When you are able to eat, try clear soups, mild foods, and liquids until all symptoms are gone for 12 to 48 hours. Other good choices include dry toast, crackers, cooked cereal, and gelatin dessert, such as Jell-O. When should you call for help? Call 911 anytime you think you may need emergency care. For example, call if:  ? · You passed out (lost consciousness). ?Call your doctor now or seek immediate medical care if:  ? · You have symptoms of dehydration, such as:  ¨ Dry eyes and a dry mouth. ¨ Passing only a little dark urine.   ¨ Feeling thirstier than usual.   ? · You have new or worsening belly pain. ? · You have a new or higher fever. ? · You vomit blood or what looks like coffee grounds. ? Watch closely for changes in your health, and be sure to contact your doctor if:  ? · You have ongoing nausea and vomiting. ? · Your vomiting is getting worse. ? · Your vomiting lasts longer than 2 days. ? · You are not getting better as expected. Where can you learn more? Go to http://cammie-diomedes.info/. Enter [phone] [id number] in the search box to learn more about \"Nausea and Vomiting: Care Instructions. \"  Current as of: March 20, 2017  Content Version: 11.4  © 3842-6505 [employer]. Care instructions adapted under license by [employer] (which disclaims liability or warranty for this information). If you have questions about a medical condition or this instruction, always ask your healthcare professional. [address] any warranty or liability for your use of this information.

## 2018-01-19 ENCOUNTER — OFFICE VISIT (OUTPATIENT)
Dept: BEHAVIORAL/MENTAL HEALTH CLINIC | Age: 23
End: 2018-01-19

## 2018-01-19 VITALS
HEIGHT: 67 IN | DIASTOLIC BLOOD PRESSURE: 98 MMHG | SYSTOLIC BLOOD PRESSURE: 151 MMHG | WEIGHT: 210 LBS | HEART RATE: 132 BPM | BODY MASS INDEX: 32.96 KG/M2

## 2018-01-19 DIAGNOSIS — R45.86 MOOD CHANGE: Primary | ICD-10-CM

## 2018-01-19 DIAGNOSIS — F41.9 ANXIETY DISORDER, UNSPECIFIED TYPE: ICD-10-CM

## 2018-01-19 DIAGNOSIS — F90.2 ATTENTION DEFICIT HYPERACTIVITY DISORDER (ADHD), COMBINED TYPE: ICD-10-CM

## 2018-01-19 RX ORDER — DEXTROAMPHETAMINE SACCHARATE, AMPHETAMINE ASPARTATE, DEXTROAMPHETAMINE SULFATE AND AMPHETAMINE SULFATE 7.5; 7.5; 7.5; 7.5 MG/1; MG/1; MG/1; MG/1
30 TABLET ORAL 2 TIMES DAILY
Qty: 60 TAB | Refills: 0 | Status: SHIPPED | OUTPATIENT
Start: 2018-02-18 | End: 2018-03-20

## 2018-01-19 RX ORDER — LORAZEPAM 0.5 MG/1
TABLET ORAL
Qty: 60 TAB | Refills: 1 | Status: SHIPPED | OUTPATIENT
Start: 2018-01-19 | End: 2018-03-20 | Stop reason: SDUPTHER

## 2018-01-19 RX ORDER — DEXTROAMPHETAMINE SACCHARATE, AMPHETAMINE ASPARTATE, DEXTROAMPHETAMINE SULFATE AND AMPHETAMINE SULFATE 7.5; 7.5; 7.5; 7.5 MG/1; MG/1; MG/1; MG/1
30 TABLET ORAL 2 TIMES DAILY
Qty: 60 TAB | Refills: 0 | Status: SHIPPED | OUTPATIENT
Start: 2018-01-19 | End: 2018-02-18

## 2018-01-19 RX ORDER — FLUOXETINE HYDROCHLORIDE 20 MG/1
20 CAPSULE ORAL DAILY
Qty: 20 CAP | Refills: 1 | Status: SHIPPED | OUTPATIENT
Start: 2018-01-19 | End: 2018-03-27 | Stop reason: SDUPTHER

## 2018-01-19 NOTE — MR AVS SNAPSHOT
[address] [facility] [address] [facility] 13 
[phone] Patient: Taina Geller MRN: [id number] :1995 Visit Information Date & Time Provider Department Dept. Phone Encounter #  
 2018  1:30 PM MD Amaury Willams 5 [phone] [id number] Follow-up Instructions Return in about 2 months (around 3/19/2018). Your Appointments 3/15/2018  1:30 PM  
ESTABLISHED PATIENT with MD Amaury Willams 5 [facility]) Appt Note: fu  
 [address] [facility] [address] [facility] 13  
[phone] [address] Upcoming Health Maintenance Date Due  
 HPV AGE 9Y-34Y (1 of 3 - Female 3 Dose Series) 2004 Pneumococcal 19-64 Medium Risk (1 of 1 - PPSV23) 2012 DTaP/Tdap/Td series (1 - Tdap) 2014 PAP AKA CERVICAL CYTOLOGY 2014 Influenza Age 5 to Adult 2017 Allergies as of 2018  Review Complete On: 2018 By: Conchita Castellanos MD  
  
 Severity Noted Reaction Type Reactions Codeine  2018    Itching, Other (comments) Hyper Hydrocodone  2016    Itching Current Immunizations  Reviewed on 2014 Name Date Influenza Vaccine 11/15/2013 Not reviewed this visit You Were Diagnosed With   
  
 Codes Comments Mood change ([facility]    -  Primary ICD-10-CM: F39 
ICD-9-CM: 296.90 Attention deficit hyperactivity disorder (ADHD), combined type     ICD-10-CM: F90.2 ICD-9-CM: 314.01 Anxiety disorder, unspecified type     ICD-10-CM: F41.9 ICD-9-CM: 300.00 Vitals BP Pulse Height(growth percentile) Weight(growth percentile) LMP BMI  
 (!) 151/98 (!) 132 5' 7\" (1.702 m) 210 lb (95.3 kg) 2017 32.89 kg/m2 OB Status Smoking Status Having regular periods Current Some Day Smoker Vitals History BMI and BSA Data Body Mass Index Body Surface Area  
 32.89 kg/m 2 2.12 m 2 Preferred Pharmacy Pharmacy Name Phone [facility] [address] [facility] [place] [address] [place] [address] [phone] Your Updated Medication List  
  
   
This list is accurate as of: 1/19/18  1:50 PM.  Always use your most recent med list.  
  
  
  
  
 * dextroamphetamine-amphetamine 30 mg tablet Commonly known as:  ADDERALL Take 1 Tab by mouth two (2) times a dayIndications: Attention-Deficit Hyperactivity Disorder. Take second dose after lunch Max Daily Amount: 2 Tabs * dextroamphetamine-amphetamine 30 mg tablet Commonly known as:  ADDERALL Take 1 Tab by mouth two (2) times a dayIndications: Attention-Deficit Hyperactivity Disorder Earliest Fill Date: 1/19/18. Max Daily Amount: 2 Tabs * dextroamphetamine-amphetamine 30 mg tablet Commonly known as:  ADDERALL Take 1 Tab by mouth two (2) times a dayIndications: Attention-Deficit Hyperactivity Disorder Earliest Fill Date: 2/18/18. Max Daily Amount: 2 Tabs Start taking on:  2/18/2018 FISH OIL 1,000 mg Cap Generic drug:  omega-3 fatty acids-vitamin e Take 1 Cap by mouth. FLUoxetine 20 mg capsule Commonly known as:  PROzac Take 1 Cap by mouth daily. IMPLANON 68 mg Impl Generic drug:  etonogestrel  
by SubDERmal route. LORazepam 0.5 mg tablet Commonly known as:  ATIVAN TK 1 T PO  BID PRF ANXIETY  Indications: anxiety disorder  
  
 naproxen sodium 220 mg tablet Commonly known as:  NAPROSYN Take 220 mg by mouth two (2) times daily (with meals). ondansetron 4 mg disintegrating tablet Commonly known as:  ZOFRAN ODT Take 1 Tab by mouth every six (6) hours as needed for Nausea. * Notice: This list has 3 medication(s) that are the same as other medications prescribed for you.  Read the directions carefully, and ask your doctor or other care provider to review them with you. Prescriptions Printed Refills LORazepam (ATIVAN) 0.5 mg tablet 1 Sig: TK 1 T PO  BID PRF ANXIETY  Indications: anxiety disorder Class: Print FLUoxetine (PROZAC) 20 mg capsule 1 Sig: Take 1 Cap by mouth daily. Class: Print Route: Oral  
 dextroamphetamine-amphetamine (ADDERALL) 30 mg tablet 0 Sig: Take 1 Tab by mouth two (2) times a dayIndications: Attention-Deficit Hyperactivity Disorder Earliest Fill Date: 1/19/18. Max Daily Amount: 2 Tabs Class: Print Route: Oral  
 dextroamphetamine-amphetamine (ADDERALL) 30 mg tablet 0 Starting on: 2/18/2018 Sig: Take 1 Tab by mouth two (2) times a dayIndications: Attention-Deficit Hyperactivity Disorder Earliest Fill Date: 2/18/18. Max Daily Amount: 2 Tabs Class: Print Route: Oral  
  
Follow-up Instructions Return in about 2 months (around 3/19/2018). Introducing [facility] SERVICES! [employer] introduces [employer] patient portal. Now you can access parts of your medical record, email your doctor's office, and request medication refills online. 1. In your internet browser, go to [email or web address] 2. Click on the First Time User? Click Here link in the Sign In box. You will see the New Member Sign Up page. 3. Enter your [employer] Access Code exactly as it appears below. You will not need to use this code after youve completed the sign-up process. If you do not sign up before the expiration date, you must request a new code. · [employer] Access Code: [email or web address] Expires: 2/7/2018  1:46 PM 
 
4. Enter the last four digits of your Social Security Number (xxxx) and Date of Birth (mm/dd/yyyy) as indicated and click Submit. You will be taken to the next sign-up page. 5. Create a [employer] ID. This will be your [employer] login ID and cannot be changed, so think of one that is secure and easy to remember. 6. Create a [employer] password. You can change your password at any time. 7. Enter your Password Reset Question and Answer. This can be used at a later time if you forget your password. 8. Enter your e-mail address. You will receive e-mail notification when new information is available in [address] 9. Click Sign Up. You can now view and download portions of your medical record. 10. Click the Download Summary menu link to download a portable copy of your medical information. If you have questions, please visit the Frequently Asked Questions section of the [employer] website. Remember, [employer] is NOT to be used for urgent needs. For medical emergencies, dial 911. Now available from your iPhone and Android! Please provide this summary of care documentation to your next provider. Your primary care clinician is listed as Pearl Shipman. If you have any questions after today's visit, please call [phone].

## 2018-01-21 NOTE — PROGRESS NOTES
Psychiatric Progress Note    Date: 1/19/2018  Account Number:  [id number]  Name: Jackie Camp    Length of psychotherapy session: 15 minutes     Total Patient Care Time Spent: 20 minutes : (Coordinated care:  counseling time with patient, individual psychotherapy with patient; discussions with family members and chart review). SUBJECTIVE:   Jackie Camp  is a 23 y.o.  female  patient presents for a therapy/psychopharmacological management appointment. Pt reports being in a lot of stress lately, feels anxious and sad, as lost the house which she had rented and was making repairs in it, her plan was to keep the house but due to finances she could not continue to have it, feels bad as had put down a lot of energy and work in it. She otherwise reports doing okay. Her BP and pulse were high when checked in the office. Pt states that she did have a blood work done at her doctor's office, her TSH was fine but she was deficient in Vitamin D, for which she is receiving treatment now. Pt has gained a lot of wt, which she attributes to her low Vit D level. Discussed healthy diet and regular exercise to help control her wt, pt agreed. Patient denies SI/HI/SIB. No evidence of AH/VH or delusions.       Appetite: no change/wt gain    Sleep: no change     Response to Treatment: Positive   Side Effects: none      Supportive/Cognitive/Reality-Oriented psychotherapy provided in regards to psychosocial stressors:   pre-admission and current problems   Housing issues   Occupational issues   Academic issues   Legal issues   Medical issues   Interpersonal conflicts   Stress of hospitalization  Psychoeducation provided  Treatment plan reviewed with patient-including diagnosis and medications  Worked on issues of denial & effects of substance dependency/use      OBJECTIVE:                 Mental Status exam: WNL except for      Sensorium  oriented to time, place and person   Relations cooperative    Eye Contact    appropriate   Appearance: age appropriate, appropriately dressed and groomed, obese   Motor Behavior:  within normal limits   Speech:  normal pitch and normal volume   Thought Process: organized and goal directed   Thought Content free of delusions and free of hallucinations   Suicidal ideations none   Homicidal ideations none   Mood:  Stressful   Affect:  Mood congruent    Memory recent  adequate   Memory remote:  adequate   Concentration:  adequate   Abstraction:  abstract   Insight:  good   Reliability good   Judgment:  good       Allergies   Allergen Reactions    Codeine Itching and Other (comments)     Hyper      Hydrocodone Itching        Current Outpatient Prescriptions   Medication Sig Dispense Refill    LORazepam (ATIVAN) 0.5 mg tablet TK 1 T PO  BID PRF ANXIETY  Indications: anxiety disorder 60 Tab 1    FLUoxetine (PROZAC) 20 mg capsule Take 1 Cap by mouth daily. 20 Cap 1    dextroamphetamine-amphetamine (ADDERALL) 30 mg tablet Take 1 Tab by mouth two (2) times a dayIndications: Attention-Deficit Hyperactivity Disorder Earliest Fill Date: 1/19/18. Max Daily Amount: 2 Tabs 60 Tab 0    [START ON 2/18/2018] dextroamphetamine-amphetamine (ADDERALL) 30 mg tablet Take 1 Tab by mouth two (2) times a dayIndications: Attention-Deficit Hyperactivity Disorder Earliest Fill Date: 2/18/18. Max Daily Amount: 2 Tabs 60 Tab 0    dextroamphetamine-amphetamine (ADDERALL) 30 mg tablet Take 1 Tab by mouth two (2) times a dayIndications: Attention-Deficit Hyperactivity Disorder. Take second dose after lunch Max Daily Amount: 2 Tabs 50 Tab 0    naproxen sodium (NAPROSYN) 220 mg tablet Take 220 mg by mouth two (2) times daily (with meals).  omega-3 fatty acids-vitamin e (FISH OIL) 1,000 mg cap Take 1 Cap by mouth.  etonogestrel (IMPLANON) 68 mg impl by SubDERmal route.  ondansetron (ZOFRAN ODT) 4 mg disintegrating tablet Take 1 Tab by mouth every six (6) hours as needed for Nausea.  20 Tab 0        Medication Changes/Adjustments:   Medications Discontinued During This Encounter   Medication Reason    FLUoxetine (PROZAC) 10 mg capsule Dose Adjustment    LORazepam (ATIVAN) 0.5 mg tablet Reorder          ASSESSMENT:  Angelica Curiel  is a 23 y.o.  female patient presented for her f/u appointment. Pt is under a lot of stress, lost her house which she had planned to own due to financial reasons, seems anxious and sad, provided support, discussed medication adjustment. Recommended monitoring her BP. Of note pt reportedly saw her PCP recently, there was no problem with BP then, per pt. Diagnoses:   Mood disorder vs Major Depressive disorder  ADHD  Anxiety disorder      RECOMMENDATIONS/PLAN:   1. Medications: Medications reviewed, increase Prozac to 20mg daily, continue rest of the meds as such. Orders Placed This Encounter    LORazepam (ATIVAN) 0.5 mg tablet    FLUoxetine (PROZAC) 20 mg capsule    dextroamphetamine-amphetamine (ADDERALL) 30 mg tablet    dextroamphetamine-amphetamine (ADDERALL) 30 mg tablet      2. Follow-up Disposition:  Return in about 2 months (around 3/19/2018).

## 2018-03-27 ENCOUNTER — OFFICE VISIT (OUTPATIENT)
Dept: BEHAVIORAL/MENTAL HEALTH CLINIC | Age: 23
End: 2018-03-27

## 2018-03-27 VITALS
HEART RATE: 93 BPM | OXYGEN SATURATION: 98 % | BODY MASS INDEX: 30.92 KG/M2 | HEIGHT: 67 IN | TEMPERATURE: 98.1 F | WEIGHT: 197 LBS | SYSTOLIC BLOOD PRESSURE: 100 MMHG | DIASTOLIC BLOOD PRESSURE: 70 MMHG | RESPIRATION RATE: 14 BRPM

## 2018-03-27 DIAGNOSIS — F39 MOOD DISORDER (HCC): ICD-10-CM

## 2018-03-27 DIAGNOSIS — F90.2 ATTENTION DEFICIT HYPERACTIVITY DISORDER (ADHD), COMBINED TYPE: Primary | ICD-10-CM

## 2018-03-27 DIAGNOSIS — F41.9 ANXIETY DISORDER, UNSPECIFIED TYPE: ICD-10-CM

## 2018-03-27 RX ORDER — ERGOCALCIFEROL 1.25 MG/1
CAPSULE ORAL
Refills: 0 | Status: ON HOLD | COMMUNITY
Start: 2018-03-25 | End: 2020-01-13

## 2018-03-27 RX ORDER — DEXTROAMPHETAMINE SACCHARATE, AMPHETAMINE ASPARTATE, DEXTROAMPHETAMINE SULFATE AND AMPHETAMINE SULFATE 7.5; 7.5; 7.5; 7.5 MG/1; MG/1; MG/1; MG/1
30 TABLET ORAL 2 TIMES DAILY
Qty: 60 TAB | Refills: 0 | Status: SHIPPED | OUTPATIENT
Start: 2018-03-27 | End: 2018-04-26

## 2018-03-27 RX ORDER — FLUOXETINE HYDROCHLORIDE 20 MG/1
20 CAPSULE ORAL DAILY
Qty: 20 CAP | Refills: 4 | Status: ON HOLD | OUTPATIENT
Start: 2018-03-27 | End: 2020-01-13

## 2018-03-27 RX ORDER — FLUOXETINE 10 MG/1
CAPSULE ORAL
Refills: 2 | COMMUNITY
Start: 2018-01-22 | End: 2018-03-27

## 2018-03-27 RX ORDER — LORAZEPAM 0.5 MG/1
TABLET ORAL
Qty: 60 TAB | Refills: 2 | Status: ON HOLD | OUTPATIENT
Start: 2018-03-27 | End: 2020-01-13

## 2018-03-27 RX ORDER — DEXTROAMPHETAMINE SACCHARATE, AMPHETAMINE ASPARTATE, DEXTROAMPHETAMINE SULFATE AND AMPHETAMINE SULFATE 7.5; 7.5; 7.5; 7.5 MG/1; MG/1; MG/1; MG/1
30 TABLET ORAL 2 TIMES DAILY
Qty: 60 TAB | Refills: 0 | Status: SHIPPED | OUTPATIENT
Start: 2018-04-26 | End: 2018-05-26

## 2018-03-27 RX ORDER — DEXTROAMPHETAMINE SACCHARATE, AMPHETAMINE ASPARTATE, DEXTROAMPHETAMINE SULFATE AND AMPHETAMINE SULFATE 7.5; 7.5; 7.5; 7.5 MG/1; MG/1; MG/1; MG/1
30 TABLET ORAL 2 TIMES DAILY
Qty: 60 TAB | Refills: 0 | Status: SHIPPED | OUTPATIENT
Start: 2018-05-26 | End: 2018-06-25

## 2018-03-27 NOTE — MR AVS SNAPSHOT
[address] [facility] [address] 1400 [address] [place] [address] 
[phone] Patient: Nita Butts MRN: [id number] :1995 Visit Information Date & Time Provider Department Dept. Phone Encounter #  
 3/27/2018 11:00 AM Almita Carmona MD [facility] [phone] [id number] Upcoming Health Maintenance Date Due  
 HPV AGE 9Y-34Y (1 of 3 - Female 3 Dose Series) 2004 Pneumococcal 19-64 Medium Risk (1 of 1 - PPSV23) 2012 DTaP/Tdap/Td series (1 - Tdap) 2014 PAP AKA CERVICAL CYTOLOGY 2014 Influenza Age 5 to Adult 2017 Allergies as of 3/27/2018  Review Complete On: 3/27/2018 By: Almita Carmona MD  
  
 Severity Noted Reaction Type Reactions Codeine  2018    Itching, Other (comments) Hyper Hydrocodone  2016    Itching Current Immunizations  Reviewed on 2014 Name Date Influenza Vaccine 11/15/2013 Not reviewed this visit You Were Diagnosed With   
  
 Codes Comments Attention deficit hyperactivity disorder (ADHD), combined type    -  Primary ICD-10-CM: F90.2 ICD-9-CM: 314.01 Mood disorder ([facility]     ICD-10-CM: F39 
ICD-9-CM: 296.90 Anxiety disorder, unspecified type     ICD-10-CM: F41.9 ICD-9-CM: 300.00 Vitals Height(growth percentile) OB Status Smoking Status 5' 7\" (1.702 m) Having regular periods Current Some Day Smoker Preferred Pharmacy Pharmacy Name Phone [facility] [address] [place] [address], [address] [place] [address] [phone] Your Updated Medication List  
  
   
This list is accurate as of 3/27/18 11:44 AM.  Always use your most recent med list.  
  
  
  
  
 * dextroamphetamine-amphetamine 30 mg tablet Commonly known as:  ADDERALL Take 1 Tab by mouth two (2) times a dayIndications: Attention-Deficit Hyperactivity Disorder. Take second dose after lunch Max Daily Amount: 2 Tabs * dextroamphetamine-amphetamine 30 mg tablet Commonly known as:  ADDERALL Take 1 Tab by mouth two (2) times a dayIndications: Attention-Deficit Hyperactivity Disorder Earliest Fill Date: 4/26/18. Take second dose after lunch Max Daily Amount: 2 Tabs Start taking on:  4/26/2018 * dextroamphetamine-amphetamine 30 mg tablet Commonly known as:  ADDERALL Take 1 Tab by mouth two (2) times a dayIndications: Attention-Deficit Hyperactivity Disorder Earliest Fill Date: 5/26/18. Take second dose after lunch Max Daily Amount: 2 Tabs Start taking on:  5/26/2018  
  
 ergocalciferol 50,000 unit capsule Commonly known as:  ERGOCALCIFEROL TK 1 C PO 1 TIME WEEKLY FISH OIL 1,000 mg Cap Generic drug:  omega-3 fatty acids-vitamin e Take 1 Cap by mouth. FLUoxetine 20 mg capsule Commonly known as:  PROzac Take 1 Cap by mouth daily. IMPLANON 68 mg Impl Generic drug:  etonogestrel  
by SubDERmal route. LORazepam 0.5 mg tablet Commonly known as:  ATIVAN Take 1 tablet twice daily as needed for anxiety  Indications: anxiety, anxiety disorder  
  
 naproxen sodium 220 mg tablet Commonly known as:  NAPROSYN Take 220 mg by mouth two (2) times daily (with meals). ondansetron 4 mg disintegrating tablet Commonly known as:  ZOFRAN ODT Take 1 Tab by mouth every six (6) hours as needed for Nausea. * Notice: This list has 3 medication(s) that are the same as other medications prescribed for you. Read the directions carefully, and ask your doctor or other care provider to review them with you. Prescriptions Printed Refills  
 dextroamphetamine-amphetamine (ADDERALL) 30 mg tablet 0 Sig: Take 1 Tab by mouth two (2) times a dayIndications: Attention-Deficit Hyperactivity Disorder. Take second dose after lunch Max Daily Amount: 2 Tabs Class: Print  Route: Oral  
 LORazepam (ATIVAN) 0.5 mg tablet 2 Sig: Take 1 tablet twice daily as needed for anxiety  Indications: anxiety, anxiety disorder Class: Print  
 dextroamphetamine-amphetamine (ADDERALL) 30 mg tablet 0 Starting on: 4/26/2018 Sig: Take 1 Tab by mouth two (2) times a dayIndications: Attention-Deficit Hyperactivity Disorder Earliest Fill Date: 4/26/18. Take second dose after lunch Max Daily Amount: 2 Tabs Class: Print Route: Oral  
 dextroamphetamine-amphetamine (ADDERALL) 30 mg tablet 0 Starting on: 5/26/2018 Sig: Take 1 Tab by mouth two (2) times a dayIndications: Attention-Deficit Hyperactivity Disorder Earliest Fill Date: 5/26/18. Take second dose after lunch Max Daily Amount: 2 Tabs Class: Print Route: Oral  
  
Prescriptions Sent to Pharmacy Refills FLUoxetine (PROZAC) 20 mg capsule 4 Sig: Take 1 Cap by mouth daily. Class: Normal  
 Pharmacy: [facility] [address] [place] [address] [address] [place] [address] #: [phone] Route: Oral  
  
Introducing [facility]! Chuck Cole introduces Netadmin patient portal. Now you can access parts of your medical record, email your doctor's office, and request medication refills online. 1. In your internet browser, go to [email or web address] 2. Click on the First Time User? Click Here link in the Sign In box. You will see the New Member Sign Up page. 3. Enter your Netadmin Access Code exactly as it appears below. You will not need to use this code after youve completed the sign-up process. If you do not sign up before the expiration date, you must request a new code. · Netadmin Access Code: [email or web address] Expires: 6/25/2018 11:32 AM 
 
4. Enter the last four digits of your Social Security Number (xxxx) and Date of Birth (mm/dd/yyyy) as indicated and click Submit. You will be taken to the next sign-up page. 5. Create a iCreate ID. This will be your iCreate login ID and cannot be changed, so think of one that is secure and easy to remember. 6. Create a iCreate password. You can change your password at any time. 7. Enter your Password Reset Question and Answer. This can be used at a later time if you forget your password. 8. Enter your e-mail address. You will receive e-mail notification when new information is available in [address] 9. Click Sign Up. You can now view and download portions of your medical record. 10. Click the Download Summary menu link to download a portable copy of your medical information. If you have questions, please visit the Frequently Asked Questions section of the iCreate website. Remember, iCreate is NOT to be used for urgent needs. For medical emergencies, dial 911. Now available from your iPhone and Android! Please provide this summary of care documentation to your next provider. Your primary care clinician is listed as Carmen Alarcon. If you have any questions after today's visit, please call [phone].

## 2018-03-27 NOTE — PROGRESS NOTES
Sea Avalos is a 23 y.o. female  Chief Complaint   Patient presents with    Mental Health Problem     Mood Change; ADHD follow up appointment     1. Have you been to the ER, urgent care clinic since your last visit? Hospitalized since your last visit? No     2. Have you seen or consulted any other health care providers outside of the [address] [place] [address] since your last visit? Include any pap smears or colon screening.   No     Visit Vitals    /70 (BP 1 Location: Left arm, BP Patient Position: Sitting)    Pulse 93    Temp 98.1 °F (36.7 °C) (Oral)    Resp 14    Ht 5' 7\" (1.702 m)    Wt 89.4 kg (197 lb)    SpO2 98%    BMI 30.85 kg/m2
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
## 2018-03-30 ENCOUNTER — HOSPITAL ENCOUNTER (EMERGENCY)
Age: 23
Discharge: HOME | End: 2018-03-30
Attending: EMERGENCY MEDICINE
Payer: COMMERCIAL

## 2018-03-30 VITALS
RESPIRATION RATE: 20 BRPM | TEMPERATURE: 97.7 F | OXYGEN SATURATION: 92 % | DIASTOLIC BLOOD PRESSURE: 89 MMHG | HEART RATE: 100 BPM | SYSTOLIC BLOOD PRESSURE: 137 MMHG

## 2018-03-30 PROCEDURE — 75810000275 HC EMERGENCY DEPT VISIT NO LEVEL OF CARE

## 2018-03-30 NOTE — ED TRIAGE NOTES
Pt decided not to be seen during triage. Pt wanted to see a psychiatrist, informed patient we could have a counselor speak to her. Pt declined services.

## 2018-06-08 ENCOUNTER — DOCUMENTATION ONLY (OUTPATIENT)
Dept: BEHAVIORAL/MENTAL HEALTH CLINIC | Age: 23
End: 2018-06-08

## 2018-06-08 NOTE — PROGRESS NOTES
Elise calls [facility] vm and left message about pt Adderall rx. Dr Dominik Landeros had post written Adderall rx for 5/26/18. Apparently according to JORGE A at [facility], pt took rx to [facility], they wrote on it and gave it back to pt. Pt ripped off whatever [facility] had written and tried to fill it at [facility]. Elise refused to fill it telling pt she had \"tampered\" with the Rx. Elise was just giving us a heads up. Pt was given a 90 day supply by Dominik Landeros and needs to seek f/u care outside [facility].

## 2018-06-24 DIAGNOSIS — F41.9 ANXIETY DISORDER, UNSPECIFIED TYPE: ICD-10-CM

## 2018-06-25 DIAGNOSIS — F41.9 ANXIETY DISORDER, UNSPECIFIED TYPE: ICD-10-CM

## 2018-06-25 RX ORDER — LORAZEPAM 0.5 MG/1
TABLET ORAL
Qty: 30 TAB | Refills: 0 | OUTPATIENT
Start: 2018-06-25

## 2018-12-29 ENCOUNTER — HOSPITAL ENCOUNTER (EMERGENCY)
Age: 23
Discharge: STILL ADMITTED OR ADMINISTRATIVE | End: 2018-12-29
Attending: EMERGENCY MEDICINE
Payer: COMMERCIAL

## 2018-12-29 PROCEDURE — 75810000275 HC EMERGENCY DEPT VISIT NO LEVEL OF CARE
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
## 2020-01-12 ENCOUNTER — HOSPITAL ENCOUNTER (INPATIENT)
Age: 25
LOS: 2 days | Discharge: LEFT BEFORE DISCHARGE | DRG: 750 | End: 2020-01-14
Attending: EMERGENCY MEDICINE | Admitting: PSYCHIATRY & NEUROLOGY
Payer: COMMERCIAL

## 2020-01-12 DIAGNOSIS — F29 PSYCHOSIS, UNSPECIFIED PSYCHOSIS TYPE (HCC): Primary | ICD-10-CM

## 2020-01-12 PROBLEM — F23 ACUTE PSYCHOSIS (HCC): Status: ACTIVE | Noted: 2020-01-12

## 2020-01-12 LAB
AMPHET UR QL SCN: POSITIVE
ANION GAP SERPL CALC-SCNC: 11 MMOL/L (ref 5–15)
APPEARANCE UR: ABNORMAL
BACTERIA URNS QL MICRO: ABNORMAL /HPF
BARBITURATES UR QL SCN: NEGATIVE
BASOPHILS # BLD: 0.1 K/UL (ref 0–0.1)
BASOPHILS NFR BLD: 1 % (ref 0–1)
BENZODIAZ UR QL: POSITIVE
BILIRUB UR QL: NEGATIVE
BUN SERPL-MCNC: 10 MG/DL (ref 6–20)
BUN/CREAT SERPL: 13 (ref 12–20)
CALCIUM SERPL-MCNC: 9.2 MG/DL (ref 8.5–10.1)
CANNABINOIDS UR QL SCN: NEGATIVE
CHLORIDE SERPL-SCNC: 105 MMOL/L (ref 97–108)
CO2 SERPL-SCNC: 25 MMOL/L (ref 21–32)
COCAINE UR QL SCN: NEGATIVE
COLOR UR: ABNORMAL
CREAT SERPL-MCNC: 0.77 MG/DL (ref 0.55–1.02)
DIFFERENTIAL METHOD BLD: NORMAL
DRUG SCRN COMMENT,DRGCM: ABNORMAL
EOSINOPHIL # BLD: 0.1 K/UL (ref 0–0.4)
EOSINOPHIL NFR BLD: 1 % (ref 0–7)
EPITH CASTS URNS QL MICRO: ABNORMAL /LPF
ERYTHROCYTE [DISTWIDTH] IN BLOOD BY AUTOMATED COUNT: 12.6 % (ref 11.5–14.5)
ETHANOL SERPL-MCNC: <10 MG/DL
GLUCOSE SERPL-MCNC: 86 MG/DL (ref 65–100)
GLUCOSE UR STRIP.AUTO-MCNC: NEGATIVE MG/DL
HCG UR QL: NEGATIVE
HCT VFR BLD AUTO: 46.5 % (ref 35–47)
HGB BLD-MCNC: 16 G/DL (ref 11.5–16)
HGB UR QL STRIP: ABNORMAL
IMM GRANULOCYTES # BLD AUTO: 0 K/UL (ref 0–0.04)
IMM GRANULOCYTES NFR BLD AUTO: 0 % (ref 0–0.5)
KETONES UR QL STRIP.AUTO: ABNORMAL MG/DL
LEUKOCYTE ESTERASE UR QL STRIP.AUTO: ABNORMAL
LYMPHOCYTES # BLD: 3.3 K/UL (ref 0.8–3.5)
LYMPHOCYTES NFR BLD: 31 % (ref 12–49)
MCH RBC QN AUTO: 31.9 PG (ref 26–34)
MCHC RBC AUTO-ENTMCNC: 34.4 G/DL (ref 30–36.5)
MCV RBC AUTO: 92.8 FL (ref 80–99)
METHADONE UR QL: NEGATIVE
MONOCYTES # BLD: 0.7 K/UL (ref 0–1)
MONOCYTES NFR BLD: 7 % (ref 5–13)
MUCOUS THREADS URNS QL MICRO: ABNORMAL /LPF
NEUTS SEG # BLD: 6.3 K/UL (ref 1.8–8)
NEUTS SEG NFR BLD: 60 % (ref 32–75)
NITRITE UR QL STRIP.AUTO: NEGATIVE
NRBC # BLD: 0 K/UL (ref 0–0.01)
NRBC BLD-RTO: 0 PER 100 WBC
OPIATES UR QL: NEGATIVE
PCP UR QL: NEGATIVE
PH UR STRIP: 6 [PH] (ref 5–8)
PLATELET # BLD AUTO: 304 K/UL (ref 150–400)
PMV BLD AUTO: 9.8 FL (ref 8.9–12.9)
POTASSIUM SERPL-SCNC: 3.5 MMOL/L (ref 3.5–5.1)
PROT UR STRIP-MCNC: NEGATIVE MG/DL
RBC # BLD AUTO: 5.01 M/UL (ref 3.8–5.2)
RBC #/AREA URNS HPF: ABNORMAL /HPF (ref 0–5)
SODIUM SERPL-SCNC: 141 MMOL/L (ref 136–145)
SP GR UR REFRACTOMETRY: 1.02 (ref 1–1.03)
TSH SERPL DL<=0.05 MIU/L-ACNC: 1.18 UIU/ML (ref 0.36–3.74)
UROBILINOGEN UR QL STRIP.AUTO: 0.2 EU/DL (ref 0.2–1)
WBC # BLD AUTO: 10.5 K/UL (ref 3.6–11)
WBC URNS QL MICRO: ABNORMAL /HPF (ref 0–4)

## 2020-01-12 PROCEDURE — 80307 DRUG TEST PRSMV CHEM ANLYZR: CPT

## 2020-01-12 PROCEDURE — 84443 ASSAY THYROID STIM HORMONE: CPT

## 2020-01-12 PROCEDURE — 99285 EMERGENCY DEPT VISIT HI MDM: CPT

## 2020-01-12 PROCEDURE — 80048 BASIC METABOLIC PNL TOTAL CA: CPT

## 2020-01-12 PROCEDURE — 36415 COLL VENOUS BLD VENIPUNCTURE: CPT

## 2020-01-12 PROCEDURE — 81001 URINALYSIS AUTO W/SCOPE: CPT

## 2020-01-12 PROCEDURE — 81025 URINE PREGNANCY TEST: CPT

## 2020-01-12 PROCEDURE — 85025 COMPLETE CBC W/AUTO DIFF WBC: CPT

## 2020-01-12 PROCEDURE — 65270000029 HC RM PRIVATE

## 2020-01-12 PROCEDURE — 74011250637 HC RX REV CODE- 250/637: Performed by: PHYSICIAN ASSISTANT

## 2020-01-12 RX ORDER — OLANZAPINE 5 MG/1
10 TABLET, ORALLY DISINTEGRATING ORAL
Status: COMPLETED | OUTPATIENT
Start: 2020-01-12 | End: 2020-01-12

## 2020-01-12 RX ADMIN — OLANZAPINE 10 MG: 5 TABLET, ORALLY DISINTEGRATING ORAL at 23:22
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
## 2020-01-13 PROCEDURE — 65220000003 HC RM SEMIPRIVATE PSYCH

## 2020-01-13 RX ORDER — TRAZODONE HYDROCHLORIDE 50 MG/1
50 TABLET ORAL
Status: DISCONTINUED | OUTPATIENT
Start: 2020-01-13 | End: 2020-01-14 | Stop reason: HOSPADM

## 2020-01-13 RX ORDER — HALOPERIDOL 5 MG/1
5 TABLET ORAL
Status: DISCONTINUED | OUTPATIENT
Start: 2020-01-13 | End: 2020-01-14 | Stop reason: HOSPADM

## 2020-01-13 RX ORDER — ADHESIVE BANDAGE
30 BANDAGE TOPICAL DAILY PRN
Status: DISCONTINUED | OUTPATIENT
Start: 2020-01-13 | End: 2020-01-14 | Stop reason: HOSPADM

## 2020-01-13 RX ORDER — DIPHENHYDRAMINE HYDROCHLORIDE 50 MG/ML
50 INJECTION, SOLUTION INTRAMUSCULAR; INTRAVENOUS
Status: DISCONTINUED | OUTPATIENT
Start: 2020-01-13 | End: 2020-01-14 | Stop reason: HOSPADM

## 2020-01-13 RX ORDER — LORAZEPAM 1 MG/1
1 TABLET ORAL
Status: DISCONTINUED | OUTPATIENT
Start: 2020-01-13 | End: 2020-01-13

## 2020-01-13 RX ORDER — ACETAMINOPHEN 325 MG/1
650 TABLET ORAL
Status: DISCONTINUED | OUTPATIENT
Start: 2020-01-13 | End: 2020-01-14 | Stop reason: HOSPADM

## 2020-01-13 RX ORDER — DEXTROAMPHETAMINE SACCHARATE, AMPHETAMINE ASPARTATE, DEXTROAMPHETAMINE SULFATE AND AMPHETAMINE SULFATE 7.5; 7.5; 7.5; 7.5 MG/1; MG/1; MG/1; MG/1
30 TABLET ORAL 2 TIMES DAILY
COMMUNITY
End: 2020-01-14

## 2020-01-13 RX ORDER — BENZTROPINE MESYLATE 1 MG/1
1 TABLET ORAL
Status: DISCONTINUED | OUTPATIENT
Start: 2020-01-13 | End: 2020-01-14 | Stop reason: HOSPADM

## 2020-01-13 RX ORDER — HALOPERIDOL 5 MG/ML
5 INJECTION INTRAMUSCULAR
Status: DISCONTINUED | OUTPATIENT
Start: 2020-01-13 | End: 2020-01-14 | Stop reason: HOSPADM

## 2020-01-13 RX ORDER — HYDROXYZINE 25 MG/1
50 TABLET, FILM COATED ORAL
Status: DISCONTINUED | OUTPATIENT
Start: 2020-01-13 | End: 2020-01-14 | Stop reason: HOSPADM

## 2020-01-13 RX ORDER — LORAZEPAM 2 MG/ML
1 INJECTION INTRAMUSCULAR
Status: DISCONTINUED | OUTPATIENT
Start: 2020-01-13 | End: 2020-01-14 | Stop reason: HOSPADM

## 2020-01-13 NOTE — BH NOTES
GROUP THERAPY PROGRESS NOTE    Patient did not participate in Discharge Group.      Clarence Dalton LPC [facility]

## 2020-01-13 NOTE — PROGRESS NOTES
Problem: Altered Thought Process (Adult/Pediatric)  Goal: *STG: Participates in treatment plan  Outcome: Progressing Towards Goal  Goal: *STG: Remains safe in hospital  Outcome: Progressing Towards Goal  Goal: *STG: Seeks staff when feelings of anxiety and fear arise  Outcome: Progressing Towards Goal  Goal: *STG: Attends activities and groups  Outcome: Progressing Towards Goal  Goal: *STG: Decreased delusional thinking  Outcome: Progressing Towards Goal  Goal: *STG: Decreased hallucinations  Outcome: Progressing Towards Goal  Goal: *STG: Absence of lethality  Outcome: Progressing Towards Goal  Goal: *STG: Demonstrates ability to understand and use improved judgment in daily activities and relationships  Outcome: Progressing Towards Goal  Goal: *LTG: Returns to baseline functioning  Outcome: Progressing Towards Goal     Problem: Falls - Risk of  Goal: *Absence of Falls  Description  Document [facility] Fall Risk and appropriate interventions in the flowsheet.   Outcome: Progressing Towards Goal  Note: Fall Risk Interventions:       Problem: ALTERED THOUGHT PROCESS (ADULT)  Goal: *STG: DEMONSTRATES IMPROVEMENT IN THOUGHT PROCESS AND SPEECH PATTERN  Outcome: Progressing Towards Goal

## 2020-01-13 NOTE — ED NOTES
Called and spoke with Susan Harden in [address] [place] [address], awaiting acceptance of patient per MD, she will notify this nurse when advised.
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
## 2020-01-13 NOTE — BH NOTES
0730 Assumed care of pt.  0930 Pt. refused breakfast, but did get OOB to sign valuables over to security. 1130 Pt. refused lunch. Pt. sleeping in room. 1330 Pt. resting in room. 1530 Pt. resting in room. 1730 Pt. resting in room. Pt. refused dinner.
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
## 2020-01-13 NOTE — GROUP NOTE
[facility]  GROUP DOCUMENTATION INDIVIDUAL                                                                          Group Therapy Note    Date: 1/13/2020    Group Start Time: 1500  Group End Time: 1600  Group Topic: Recreational/Music Therapy    [address] [facility] 3 ACUTE BEHAV [facility]    Jarod Dutta [facility] GROUP    Group Therapy Note    Attendees: 5         Attendance: Did not attend    Patient's Goal:      Interventions/techniques:  Jill Hodges

## 2020-01-13 NOTE — ED NOTES
Called Nursing supervisor to obtain Zyprexa for patient from 3rd floor. Has spoke with Psych charge nurse and she relayed it was available on floor. Patient states she does not want to take any medication that she \"doesn't know about\". Patient handout on Zyprexa printed from [employer] and given to patient at this time.

## 2020-01-13 NOTE — BH NOTES
GROUP THERAPY PROGRESS NOTE    Patient did not participate in Process Group.      Tamiko Storey LPC [facility]
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
## 2020-01-13 NOTE — BH NOTES
Patient arrived on the unit in a wheelchair with . Patient is a TDO for paranoid and delusional behavior . Patient a bag of personal belongings with her and behavior during admission was loud and euphoric.  Denies SI/HI/AVH  Hourly rounding done   Slept 5 hours

## 2020-01-13 NOTE — PROGRESS NOTES
[facility] Admission Pharmacy Medication Reconciliation     Recommendations/Findings:   1) Reports she is only taking dextroamphetamine-amphetamine currently. Of note, urine drug screen was also positive for benzodiazepines. Lorazepam was last filled in October and she reports she is no longer taking it. 2) Updated preferred outpatient pharmacy to [facility]. The following changes were made to the PTA medication list:   Additions:   Dextroamphetamine-amphetamine  Modifications:   None  Deletions:   Lorazepam  Ergocalciferol  Implanon implant  Fluoxetine  Naproxen  Fish oil  Ondansetron    Total Time Spent: 10 minutes    Information obtained from: Patient interview, [facility]    Patient allergies: Allergies as of 01/12/2020 - Review Complete 01/12/2020   Allergen Reaction Noted    Codeine Itching and Other (comments) 01/17/2018    Hydrocodone Itching 12/01/2016       Prior to Admission Medications   Prescriptions Last Dose Informant Patient Reported? Taking?   dextroamphetamine-amphetamine (ADDERALL) 30 mg tablet  Self Yes Yes   Sig: Take 30 mg by mouth two (2) times a day.  Indications: Attention Deficit Disorder with Hyperactivity      Facility-Administered Medications: None        Thank you,  GIRISH Payne BCPS  Desk: [phone] ([id number])  Pharmacy: [phone] ([id number])

## 2020-01-13 NOTE — ROUTINE PROCESS
TRANSFER - OUT REPORT:    Verbal report given to Maty Navarro RN(name) on Breanan Sibley  being transferred to [facility](unit) for routine progression of care       Report consisted of patients Situation, Background, Assessment and   Recommendations(SBAR). Information from the following report(s) ED Summary and MAR was reviewed with the receiving nurse. Lines:       Opportunity for questions and clarification was provided.       Patient transported with:   Monitor
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
## 2020-01-13 NOTE — GROUP NOTE
[facility]  GROUP DOCUMENTATION INDIVIDUAL                                                                          Group Therapy Note    Date: 1/13/2020    Group Start Time: 1100  Group End Time: 1200  Group Topic: Topic Group    [facility]    Jarod Dutta [id number] GROUP    Group Therapy Note    Attendees: 4         Attendance: Did not attend    Patient's Goal:      Interventions/techniques  Becki Hernandez

## 2020-01-14 VITALS
OXYGEN SATURATION: 100 % | SYSTOLIC BLOOD PRESSURE: 111 MMHG | TEMPERATURE: 98 F | BODY MASS INDEX: 32.42 KG/M2 | HEART RATE: 106 BPM | WEIGHT: 207 LBS | DIASTOLIC BLOOD PRESSURE: 79 MMHG | RESPIRATION RATE: 20 BRPM

## 2020-01-14 PROCEDURE — 74011250637 HC RX REV CODE- 250/637: Performed by: PSYCHIATRY & NEUROLOGY

## 2020-01-14 RX ORDER — OLANZAPINE 5 MG/1
5 TABLET ORAL EVERY 12 HOURS
Status: DISCONTINUED | OUTPATIENT
Start: 2020-01-14 | End: 2020-01-14 | Stop reason: HOSPADM

## 2020-01-14 RX ORDER — OLANZAPINE 5 MG/1
5 TABLET ORAL EVERY 12 HOURS
Qty: 60 TAB | Refills: 0 | Status: SHIPPED | OUTPATIENT
Start: 2020-01-14 | End: 2020-09-09 | Stop reason: DRUGHIGH

## 2020-01-14 RX ADMIN — OLANZAPINE 5 MG: 5 TABLET, FILM COATED ORAL at 13:54

## 2020-01-14 NOTE — DISCHARGE SUMMARY
PSYCHIATRIC DISCHARGE SUMMARY         IDENTIFICATION:    Patient Name  Ramone Lopez   Date of Birth 1995   [facility] [id number]   Medical Record Number  [id number]      Age  30 y.o. PCP Rose Rueda MD   Admit date:  1/12/2020    Discharge date: 1/14/2020   Room Number  309/01  @ [facility]   Date of Service  1/14/2020            TYPE OF DISCHARGE: AMA               CONDITION AT DISCHARGE: serious       PROVISIONAL & DISCHARGE DIAGNOSES:    Problem List  Date Reviewed: 9/29/2015          Codes Class    * (Principal) Acute psychosis ([facility] ICD-10-CM: F23  ICD-9-CM: 298.9         Mood change ICD-10-CM: R45.86  ICD-9-CM: 296.90         Decreased fetal movement ICD-10-CM: O36.8190  ICD-9-CM: 655.70         Tobacco use ICD-10-CM: Z72.0  ICD-9-CM: 305.1         Sleep disorder breathing ICD-10-CM: G47.30  ICD-9-CM: 780.59         Supervision of normal first pregnancy ICD-10-CM: Z34.00  ICD-9-CM: V22.0     Overview Addendum 6/3/2014 11:21 AM by Nate Donald MD     CF with TS, n/s NT, growth US 32-34wk, tobacco use, prefers to avoid IOL                   Active Hospital Problems    *Acute psychosis ([facility]        DISCHARGE DIAGNOSIS:   Axis I:  SEE ABOVE  Axis II: SEE ABOVE  Axis III: SEE ABOVE  Axis IV:  lack of structure  Axis V:  10 on admission, 50 on discharge     CC & HISTORY OF PRESENT ILLNESS:  \"psychosis\"    The patient, Ramone Lopez, is a 30 y.o. WHITE OR  female with a past psychiatric history significant for bipolar disorder by chart review (patient denies having diagnosis), who presents at this time with complaints of (and/or evidence of) the following emotional symptoms: delusions and psychotic behavior. Additional symptomatology include anxiety and emotional lability. The above symptoms have been present for 48 hours. These symptoms are of moderate to high severity. These symptoms are intermittent/ fleeting in nature.   The patient's condition has been precipitated by psychosocial stressors. Patient's condition made worse by treatment noncompliance. UDS: +amphetamines, benzodiazpines; BAL=0.      The patient is a poor and tearful historian. The patient corroborates the above narrative. The patient contracts for safety on the unit and gives consent for the team to contact collateral. The patient is amenable to initiating treatment while on the unit. The patient reports having a 11year old daughter who is presently being cared for by her mother.        SOCIAL HISTORY:    Social History     Socioeconomic History    Marital status: SINGLE     Spouse name: Not on file    Number of children: Not on file    Years of education: Not on file    Highest education level: Not on file   Occupational History    Not on file   Social Needs    Financial resource strain: Not on file    Food insecurity:     Worry: Not on file     Inability: Not on file    Transportation needs:     Medical: Not on file     Non-medical: Not on file   Tobacco Use    Smoking status: Current Some Day Smoker     Types: Cigarettes    Smokeless tobacco: Never Used    Tobacco comment: 4-5 cigarettes per day   Substance and Sexual Activity    Alcohol use: No    Drug use: No    Sexual activity: Not Currently     Partners: Male     Birth control/protection: None   Lifestyle    Physical activity:     Days per week: Not on file     Minutes per session: Not on file    Stress: Not on file   Relationships    Social connections:     Talks on phone: Not on file     Gets together: Not on file     Attends Adventism service: Not on file     Active member of club or organization: Not on file     Attends meetings of clubs or organizations: Not on file     Relationship status: Not on file    Intimate partner violence:     Fear of current or ex partner: Not on file     Emotionally abused: Not on file     Physically abused: Not on file     Forced sexual activity: Not on file   Other Topics Concern    Not on file   Social History Narrative    Not on file      FAMILY HISTORY:   Family History   Problem Relation Age of Onset    Hypertension Mother     Diabetes Mother     Migraines Mother     Headache Mother     Hypertension Maternal Grandmother              HOSPITALIZATION COURSE:    Gold Pepe was admitted to the inpatient psychiatric unit [facility] of [facility] for acute psychiatric stabilization in regards to symptomatology as described in the HPI above. The differential diagnosis at time of admission included: brief psychotic disorder vs schizophreniform disorder. While on the unit Gold Pepe was involved in individual, group, occupational and milieu therapy. Psychiatric medications were adjusted during this hospitalization including Zyprexa. Gold Pepe demonstrated minimal improvement in overall condition. The patient was observed for 36 hours on the unit; her thought process improved off substances but paranoid ideation and delusions persisted; she remained unable to reality test. The patient was committed by the court and started on Zyprexa, which she accepted. No PRNs were given for agitation or sienna psychosis since arrival from the ED, where she had Zyprexa ODT 10 mg. On hospital day 2, the patient's mother asked to take the patient home against medical advice so she could assist in the care of the patient's 11year old daughter. Both patient and her mother were counseled extensively on the likelihood of deterioration of thought process without continued observation and treatment, and the patient's mother agreed to ensure the patient remained compliant with antipsychotic medication as well as take her to community follow up 2 days post discharge. Patient encouraged to return to the hospital immediately for worsening of symptoms, voiced understanding.     At time of discharge, Gold Pepe is free of suicidal and homicidal ideations (appears to be at very low risk of suicide or homicide) and reports many positive predictive factors in terms of not attempting suicide or homicide. 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 All members of the treatment team concur with each other in regards to plans for discharge today following a discussion of the leaving the hospital against medical advice. Patient and family are aware and in agreement with discharge and discharge plan.          LABS AND IMAGAING:    Labs Reviewed   DRUG SCREEN, URINE - Abnormal; Notable for the following components:       Result Value    AMPHETAMINES POSITIVE (*)     BENZODIAZEPINES POSITIVE (*)     All other components within normal limits   URINALYSIS W/ RFLX MICROSCOPIC - Abnormal; Notable for the following components:    Appearance CLOUDY (*)     Ketone TRACE (*)     Blood LARGE (*)     Leukocyte Esterase SMALL (*)     All other components within normal limits   URINE MICROSCOPIC ONLY - Abnormal; Notable for the following components:    Epithelial cells MODERATE (*)     Bacteria 1+ (*)     Mucus 2+ (*)     All other components within normal limits   CBC WITH AUTOMATED DIFF   METABOLIC PANEL, BASIC   TSH 3RD GENERATION   ETHYL ALCOHOL   HCG URINE, QL     No results found for: DS35, PHEN, PHENO, PHENT, DILF, DS39, PHENY, PTN, VALF2, VALAC, VALP, VALPR, DS6, CRBAM, CRBAMP, CARB2, XCRBAM  Admission on 01/12/2020   Component Date Value Ref Range Status    WBC 01/12/2020 10.5  3.6 - 11.0 K/uL Final    RBC 01/12/2020 5.01  3.80 - 5.20 M/uL Final    HGB 01/12/2020 16.0  11.5 - 16.0 g/dL Final    HCT 01/12/2020 46.5  35.0 - 47.0 % Final    MCV 01/12/2020 92.8  80.0 - 99.0 FL Final    MCH 01/12/2020 31.9  26.0 - 34.0 PG Final    MCHC 01/12/2020 34.4  30.0 - 36.5 g/dL Final    RDW 01/12/2020 12.6  11.5 - 14.5 % Final    PLATELET 12/32/0253 333  150 - 400 K/uL Final    MPV 01/12/2020 9.8  8.9 - 12.9 FL Final    NRBC 01/12/2020 0.0  0  WBC Final    ABSOLUTE NRBC 01/12/2020 0.00  0.00 - 0.01 K/uL Final    NEUTROPHILS 01/12/2020 60  32 - 75 % Final    LYMPHOCYTES 01/12/2020 31  12 - 49 % Final    MONOCYTES 01/12/2020 7  5 - 13 % Final    EOSINOPHILS 01/12/2020 1  0 - 7 % Final    BASOPHILS 01/12/2020 1  0 - 1 % Final    IMMATURE GRANULOCYTES 01/12/2020 0  0.0 - 0.5 % Final    ABS. NEUTROPHILS 01/12/2020 6.3  1.8 - 8.0 K/UL Final    ABS. LYMPHOCYTES 01/12/2020 3.3  0.8 - 3.5 K/UL Final    ABS. MONOCYTES 01/12/2020 0.7  0.0 - 1.0 K/UL Final    ABS. EOSINOPHILS 01/12/2020 0.1  0.0 - 0.4 K/UL Final    ABS. BASOPHILS 01/12/2020 0.1  0.0 - 0.1 K/UL Final    ABS. IMM.  GRANS. 01/12/2020 0.0  0.00 - 0.04 K/UL Final    DF 01/12/2020 AUTOMATED    Final    Sodium 01/12/2020 141  136 - 145 mmol/L Final    Potassium 01/12/2020 3.5  3.5 - 5.1 mmol/L Final    Chloride 01/12/2020 105  97 - 108 mmol/L Final    CO2 01/12/2020 25  21 - 32 mmol/L Final    Anion gap 01/12/2020 11  5 - 15 mmol/L Final    Glucose 01/12/2020 86  65 - 100 mg/dL Final    BUN 01/12/2020 10  6 - 20 MG/DL Final    Creatinine 01/12/2020 0.77  0.55 - 1.02 MG/DL Final    BUN/Creatinine ratio 01/12/2020 13  12 - 20   Final    GFR est AA 01/12/2020 >60  >60 ml/min/1.73m2 Final    GFR est non-AA 01/12/2020 >60  >60 ml/min/1.73m2 Final    Calcium 01/12/2020 9.2  8.5 - 10.1 MG/DL Final    TSH 01/12/2020 1.18  0.36 - 3.74 uIU/mL Final    AMPHETAMINES 01/12/2020 POSITIVE* NEG   Final    BARBITURATES 01/12/2020 NEGATIVE   NEG   Final    BENZODIAZEPINES 01/12/2020 POSITIVE* NEG   Final    COCAINE 01/12/2020 NEGATIVE   NEG   Final    METHADONE 01/12/2020 NEGATIVE   NEG   Final    OPIATES 01/12/2020 NEGATIVE   NEG   Final    PCP(PHENCYCLIDINE) 01/12/2020 NEGATIVE   NEG   Final    THC (TH-CANNABINOL) 01/12/2020 NEGATIVE   NEG   Final    Drug screen comment 01/12/2020 (NOTE)   Final    ALCOHOL(ETHYL),SERUM 01/12/2020 <10  <10 MG/DL Final    Color 01/12/2020 YELLOW/STRAW    Final    Appearance 01/12/2020 CLOUDY* CLEAR   Final    Specific gravity 01/12/2020 1.025  1.003 - 1.030   Final    pH (UA) 01/12/2020 6.0  5.0 - 8.0   Final    Protein 01/12/2020 NEGATIVE   NEG mg/dL Final    Glucose 01/12/2020 NEGATIVE   NEG mg/dL Final    Ketone 01/12/2020 TRACE* NEG mg/dL Final    Bilirubin 01/12/2020 NEGATIVE   NEG   Final    Blood 01/12/2020 LARGE* NEG   Final    Urobilinogen 01/12/2020 0.2  0.2 - 1.0 EU/dL Final    Nitrites 01/12/2020 NEGATIVE   NEG   Final    Leukocyte Esterase 01/12/2020 SMALL* NEG   Final    WBC 01/12/2020 0-4  0 - 4 /hpf Final    RBC 01/12/2020 0-5  0 - 5 /hpf Final    Epithelial cells 01/12/2020 MODERATE* FEW /lpf Final    Bacteria 01/12/2020 1+* NEG /hpf Final    Mucus 01/12/2020 2+* NEG /lpf Final    HCG urine, QL 01/12/2020 NEGATIVE   NEG   Final     No results found. DISPOSITION:    Home. Patient to f/u with psychiatric appointments. Patient is to f/u with internist as directed. FOLLOW-UP CARE:    Activity as tolerated  Regular diet  Wound Care: none needed. Follow-up Information     Follow up With Specialties Details Why Contact Info    Charity Gaucher, MD [facility] [phone]      [facility] Service Board  Go on 1/16/2020 Initial intake appointment for case management, therapy and medication management services. After this appointment please attend a psychiatry open access appointment starting at 11:30 AM on Tuesdays /Thursdays to begin medication management. [address]  [phone]  Fax: [phone]  Mon and Fri. 8am -2pm    Tues-Thurs 8am-4pm                    PROGNOSIS:   Poor---- based on nature of patient's pathology/ies and treatment compliance issues. Prognosis is greatly dependent upon patient's ability to remain sober and to follow up with scheduled appointments as well as to comply with psychiatric medications as prescribed. DISCHARGE MEDICATIONS:     Informed consent given for the use of following psychotropic medications:  Current Discharge Medication List      START taking these medications    Details   OLANZapine (ZYPREXA) 5 mg tablet Take 1 Tab by mouth every twelve (12) hours. Indications: Psychosis  Qty: 60 Tab, Refills: 0         STOP taking these medications       dextroamphetamine-amphetamine (ADDERALL) 30 mg tablet Comments:   Reason for Stopping:                      A coordinated, multidisplinary treatment team round was conducted with Oneida Glass is done daily here at [facility]. This team consists of the nurse, psychiatric unit pharmacist,  and Tonio Chaudhary. I have spent greater than 35 minutes on discharge work.     Signed:  Steven Frances MD  1/14/2020

## 2020-01-14 NOTE — BH NOTES
[employer] conducted TDO hearing this morning. The ending result of hearing patient, committed.

## 2020-01-14 NOTE — GROUP NOTE
[facility]  GROUP DOCUMENTATION INDIVIDUAL Group Therapy Note Date: 1/14/2020 Group Start Time: 1100 Group End Time: 1200 Group Topic: Topic Group [facility] [address] ACUTE [address] [facility], [address] GROUP DOCUMENTATION GROUP Group Therapy Note Attendees: 8 Attendance: Attended Patient's Goal:  To participate in healthy relationships Foxwordy game Interventions/techniques: Supported-things pertaining to relationships Follows Directions: Followed directions Interactions: Interacted appropriately Mental Status: Calm Behavior/appearance: Attentive, Cooperative and Needed prompting Goals Achieved: Able to engage in interactions, Able to listen to others and Discussed coping Additional Notes:   
 
Lori Jha

## 2020-01-14 NOTE — PROGRESS NOTES
Problem: Altered Thought Process (Adult/Pediatric)  Goal: *STG: Participates in treatment plan  Outcome: Progressing Towards Goal  Goal: *STG: Remains safe in hospital  Outcome: Progressing Towards Goal  Goal: *STG: Seeks staff when feelings of anxiety and fear arise  Outcome: Progressing Towards Goal  Goal: *STG: Complies with medication therapy  Outcome: Progressing Towards Goal  Goal: *STG: Attends activities and groups  Outcome: Progressing Towards Goal  Goal: *STG: Decreased delusional thinking  Outcome: Progressing Towards Goal  Goal: *STG: Decreased hallucinations  Outcome: Progressing Towards Goal  Goal: *STG: Absence of lethality  Outcome: Progressing Towards Goal  Goal: *STG: Demonstrates ability to understand and use improved judgment in daily activities and relationships  Outcome: Progressing Towards Goal  Goal: *LTG: Returns to baseline functioning  Outcome: Progressing Towards Goal

## 2020-01-14 NOTE — BH NOTES
1900- Assumed care and received report from off going staff. 1930- At the beginning of the shift patient resting in bed with eyes open. Calm and cooperative at this time. Denies SI,HI and AVH. 2030-Tolerated bedtime snack    2100- No PRN's given    2130-Patient became upset due to not being able to find her papers that she received in the ER. She stated that she is ready to go home and has a place to reside. Redirected to voice her concerns to treatment team in the am.    Q15 minutes checks continue for safety    Approx.  8 hours of sleep

## 2020-01-14 NOTE — BH NOTES
0730 Assumed care of pt. Pt. refused breakfast this am.  0930 Pt. resting in room. 1130 Pt. eating lunch in day room. 1200 Pt. on the phone with mother at nurses station. Pts. mother informed nurse that pts. 11 yr old daughter was home. Pts. mother is disabled and can not take care of pts. Child. Pt. to be D/C'd today AMA. 1300 Pt. D/C'd with mom.

## 2020-01-14 NOTE — PROGRESS NOTES
Problem: Altered Thought Process (Adult/Pediatric)  Goal: *STG: Seeks staff when feelings of anxiety and fear arise  Outcome: Progressing Towards Goal     Problem: Altered Thought Process (Adult/Pediatric)  Goal: *STG: Seeks staff when feelings of anxiety and fear arise  Outcome: Progressing Towards Goal

## 2020-01-15 NOTE — BH NOTES
Behavioral Health Transition Record to Provider    Patient Name: Ozella Goltz  YOB: 1995  Medical Record Number: [id number]  Date of Admission: 1/12/2020  Date of Discharge: 1/14/2020    Attending Provider: Patricio Griffiths MD  Discharging Provider: Patricio Griffiths MD  To contact this individual call [phone] and ask the  to page. If unavailable, ask to be transferred to [facility] Provider on call. [facility] Provider will be available on call 24/7 and during holidays. Primary Care Provider: Scott Thomas MD    Allergies   Allergen Reactions    Codeine Itching and Other (comments)     Hyper      Hydrocodone Itching       Reason for Admission: psychosis     Admission Diagnosis: Acute psychosis ([facility] [F23]    * No surgery found *    Results for orders placed or performed during the hospital encounter of 01/12/20   CBC WITH AUTOMATED DIFF   Result Value Ref Range    WBC 10.5 3.6 - 11.0 K/uL    RBC 5.01 3.80 - 5.20 M/uL    HGB 16.0 11.5 - 16.0 g/dL    HCT 46.5 35.0 - 47.0 %    MCV 92.8 80.0 - 99.0 FL    MCH 31.9 26.0 - 34.0 PG    MCHC 34.4 30.0 - 36.5 g/dL    RDW 12.6 11.5 - 14.5 %    PLATELET 363 758 - 160 K/uL    MPV 9.8 8.9 - 12.9 FL    NRBC 0.0 0  WBC    ABSOLUTE NRBC 0.00 0.00 - 0.01 K/uL    NEUTROPHILS 60 32 - 75 %    LYMPHOCYTES 31 12 - 49 %    MONOCYTES 7 5 - 13 %    EOSINOPHILS 1 0 - 7 %    BASOPHILS 1 0 - 1 %    IMMATURE GRANULOCYTES 0 0.0 - 0.5 %    ABS. NEUTROPHILS 6.3 1.8 - 8.0 K/UL    ABS. LYMPHOCYTES 3.3 0.8 - 3.5 K/UL    ABS. MONOCYTES 0.7 0.0 - 1.0 K/UL    ABS. EOSINOPHILS 0.1 0.0 - 0.4 K/UL    ABS. BASOPHILS 0.1 0.0 - 0.1 K/UL    ABS. IMM.  GRANS. 0.0 0.00 - 0.04 K/UL    DF AUTOMATED     METABOLIC PANEL, BASIC   Result Value Ref Range    Sodium 141 136 - 145 mmol/L    Potassium 3.5 3.5 - 5.1 mmol/L    Chloride 105 97 - 108 mmol/L    CO2 25 21 - 32 mmol/L    Anion gap 11 5 - 15 mmol/L    Glucose 86 65 - 100 mg/dL    BUN 10 6 - 20 MG/DL    Creatinine 0.77 0.55 - 1.02 MG/DL    BUN/Creatinine ratio 13 12 - 20      GFR est AA >60 >60 ml/min/1.73m2    GFR est non-AA >60 >60 ml/min/1.73m2    Calcium 9.2 8.5 - 10.1 MG/DL   TSH 3RD GENERATION   Result Value Ref Range    TSH 1.18 0.36 - 3.74 uIU/mL   DRUG SCREEN, URINE   Result Value Ref Range    AMPHETAMINES POSITIVE (A) NEG      BARBITURATES NEGATIVE  NEG      BENZODIAZEPINES POSITIVE (A) NEG      COCAINE NEGATIVE  NEG      METHADONE NEGATIVE  NEG      OPIATES NEGATIVE  NEG      PCP(PHENCYCLIDINE) NEGATIVE  NEG      THC (TH-CANNABINOL) NEGATIVE  NEG      Drug screen comment (NOTE)    ETHYL ALCOHOL   Result Value Ref Range    ALCOHOL(ETHYL),SERUM <10 <10 MG/DL   URINALYSIS W/ RFLX MICROSCOPIC   Result Value Ref Range    Color YELLOW/STRAW      Appearance CLOUDY (A) CLEAR      Specific gravity 1.025 1.003 - 1.030      pH (UA) 6.0 5.0 - 8.0      Protein NEGATIVE  NEG mg/dL    Glucose NEGATIVE  NEG mg/dL    Ketone TRACE (A) NEG mg/dL    Bilirubin NEGATIVE  NEG      Blood LARGE (A) NEG      Urobilinogen 0.2 0.2 - 1.0 EU/dL    Nitrites NEGATIVE  NEG      Leukocyte Esterase SMALL (A) NEG     URINE MICROSCOPIC ONLY   Result Value Ref Range    WBC 0-4 0 - 4 /hpf    RBC 0-5 0 - 5 /hpf    Epithelial cells MODERATE (A) FEW /lpf    Bacteria 1+ (A) NEG /hpf    Mucus 2+ (A) NEG /lpf   HCG URINE, QL   Result Value Ref Range    HCG urine, QL NEGATIVE  NEG         Immunizations administered during this encounter:   Immunization History   Administered Date(s) Administered    Influenza Vaccine 11/15/2013       Screening for Metabolic Disorders for Patients on Antipsychotic Medications  (Data obtained from the EMR)    Estimated Body Mass Index  Estimated body mass index is 32.42 kg/m² as calculated from the following:    Height as of 3/27/18: 5' 7\" (1.702 m). Weight as of this encounter: 93.9 kg (207 lb).      Vital Signs/Blood Pressure  Visit Vitals  /79 (BP 1 Location: Left arm, BP Patient Position: Standing)   Pulse (!) 106   Temp 98 °F (36.7 °C)   Resp 20   Wt 93.9 kg (207 lb)   SpO2 100%   Breastfeeding No   BMI 32.42 kg/m²       Blood Glucose/Hemoglobin A1c  Lab Results   Component Value Date/Time    Glucose 86 01/12/2020 07:11 PM       No results found for: HBA1C, HGBE8, CVN0PBVU     Lipid Panel  No results found for: CHOL, CHOLX, CHLST, CHOLV, 572839, HDL, HDLP, LDL, LDLC, DLDLP, TGLX, TRIGL, TRIGP, CHHD, CHHDX     Discharge Diagnosis: Please refer to physician's discharge summary. Discharge Plan: The patient Devin Wright exhibits the ability to control behavior in a less restrictive environment. Patient's level of functioning is improving. No assaultive/destructive behavior has been observed for the past 24 hours. No suicidal/homicidal threat or behavior has been observed for the past 24 hours. There is no evidence of serious medication side effects. Patient has not been in physical or protective restraints for at least the past 24 hours. If weapons involved, how are they secured? None involved. Is patient aware of and in agreement with discharge plan? Leaving AMA - signed into mother's care    Arrangements for medication:  Prescriptions given to patient. Copy of discharge instructions to provider?:  [facility]    Arrangements for transportation home:  Mother    Keep all follow up appointments as scheduled, continue to take prescribed medications per physician instructions. Mental health crisis number:  737 or your local mental health crisis line number at [phone]. Discharge Medication List and Instructions:   Discharge Medication List as of 1/14/2020  1:28 PM      START taking these medications    Details   OLANZapine (ZYPREXA) 5 mg tablet Take 1 Tab by mouth every twelve (12) hours.  Indications: Psychosis, Print, Disp-60 Tab, R-0         STOP taking these medications       dextroamphetamine-amphetamine (ADDERALL) 30 mg tablet Comments:   Reason for Stopping: Unresulted Labs (24h ago, onward)    None        To obtain results of studies pending at discharge, please contact N/A. Follow-up Information     Follow up With Specialties Details Why Contact Info    Cortez Payan MD [facility] [phone]      [facility] Service Board  Go on 1/16/2020 Initial intake appointment for case management, therapy and medication management services. After this appointment please attend a psychiatry open access appointment starting at 11:30 AM on Tuesdays /Thursdays to begin medication management. Amber [address] [place] [address]  [phone]  Fax: [phone]  [address]     Tues-Thurs 8am-4pm             Advanced Directive:   Does the patient have an appointed surrogate decision maker? Unknown   Does the patient have a Medical Advance Directive? Unknown   Does the patient have a Psychiatric Advance Directive? Unknown   If the patient does not have a surrogate or Medical Advance Directive AND Psychiatric Advance Directive, the patient was offered information on these advance directives. Unknown     Patient Instructions: Please continue all medications until otherwise directed by physician. Tobacco Cessation Discharge Plan:   Is the patient a smoker and needs referral for smoking cessation? No  Patient referred to the following for smoking cessation with an appointment? No   Patient was offered medication to assist with smoking cessation at discharge? No  Was education for smoking cessation added to the discharge instructions? No     Alcohol/Substance Abuse Discharge Plan:   Does the patient have a history of substance/alcohol abuse and requires a referral for treatment? Yes  Patient referred to the following for substance/alcohol abuse treatment with an appointment? Yes  Patient was offered medication to assist with alcohol cessation at discharge?  No  Was education for substance/alcohol abuse added to discharge instructions? Yes     Patient discharged to Home; provided to the patient/caregiver either in hard copy or electronically. Continuing care paperwork was faxed to community mental health providers.

## 2020-01-15 NOTE — DISCHARGE INSTRUCTIONS
Patient Education      DISCHARGE SUMMARY    Dania Harrington  : 1995  MRN: [id number]    The patient Delmar Mulligan exhibits the ability to control behavior in a less restrictive environment. Patient's level of functioning is improving. No assaultive/destructive behavior has been observed for the past 24 hours. No suicidal/homicidal threat or behavior has been observed for the past 24 hours. There is no evidence of serious medication side effects. Patient has not been in physical or protective restraints for at least the past 24 hours. If weapons involved, how are they secured? None involved. Is patient aware of and in agreement with discharge plan? Leaving AMA - signed into mother's care    Arrangements for medication:  Prescriptions given to patient. Copy of discharge instructions to provider?:  [facility]    Arrangements for transportation home:  Mother    Keep all follow up appointments as scheduled, continue to take prescribed medications per physician instructions. Mental health crisis number:  324 or your local mental health crisis line number at [phone]. Bipolar Disorder: Care Instructions  Your Care Instructions    Bipolar disorder is an illness that causes extreme mood changes, from times of very high energy (manic episodes) to times of depression. But many people with bipolar disorder show only the symptoms of depression. These moods may cause problems with your work, school, family life, friendships, and how well you function. This disease is also called manic-depression. There is no cure for bipolar disorder, but it can be helped with medicines. Counseling may also help. It is important to take your medicines exactly as prescribed, even when you feel well. You may need lifelong treatment. Follow-up care is a key part of your treatment and safety. Be sure to make and go to all appointments, and call your doctor if you are having problems.  It's also a good idea to know your test results and keep a list of the medicines you take. How can you care for yourself at home? · Be safe with medicines. Take your medicines exactly as prescribed. Do not stop or change a medicine without talking to your doctor first. Seb Mccormack and your doctor may need to try different combinations of medicines to find what works for you. · Take your medicines on schedule to keep your moods even. When you feel good, you may think that you do not need your medicines. But it is important to keep taking them. · Go to your counseling sessions. Call and talk with your counselor if you can't go to a session or if you don't think the sessions are helping. Do not just stop going. · Get at least 30 minutes of activity on most days of the week. Walking is a good choice. You also may want to do other things, such as running, swimming, or cycling. · Get enough sleep. Keep your room dark and quiet. Try to go to bed at the same time every night. · Eat a healthy diet. This includes whole grains, dairy, fruits, vegetables, and protein. Eat foods from each of these groups. · Try to lower your stress. Manage your time, build a strong support system, and lead a healthy lifestyle. To lower your stress, try physical activity, slow deep breathing, or getting a massage. · Do not use alcohol or illegal drugs. · Learn the early signs of your mood changes. You can then take steps to help yourself feel better. · Ask for help from friends and family when you need it. You may need help with daily chores when you are depressed. When you are manic, you may need support to control your high energy levels. What should you do if someone in your family has bipolar disorder? · Learn about the disease and the signs that it is getting worse. · Remind your family member that you love him or her. · Make a plan with all family members about how to take care of your loved one when his or her symptoms are bad.   · Talk about your fears and concerns and those of other family members. Seek counseling if needed. · Do not focus attention only on the person who is in treatment. · Remind yourself that it will take time for changes to occur. · Do not blame yourself for the disease. · Know your legal rights and the legal rights of your family member. Support groups or counselors can help you with this information. · Take care of yourself. Keep up with your own interests, such as your career, hobbies, and friends. Use exercise, positive self-talk, deep breathing, and other relaxing exercises to help lower your stress. · Give yourself time to grieve. You may need to deal with emotions such as anger, fear, and frustration. After you work through your feelings, you will be better able to care for yourself and your family. · If you are having a hard time with your feelings or with your relationship with your family member, talk with a counselor. When should you call for help? Call 911 anytime you think you may need emergency care. For example, call if:    · You feel like hurting yourself or someone else.     · Someone who has bipolar disorder displays dangerous behavior, and you think the person might hurt himself or herself or someone else.   [facility] your doctor now or seek immediate medical care if:    · You hear voices.     · Someone you know has bipolar disorder and talks about suicide. Keep the numbers for these national suicide hotlines: 2-908-851-TALK (9-819-828-464-619-6807) and 5-252-WWJNZZI (6-935.721.2335). If a suicide threat seems real, with a specific plan and a way to carry it out, stay with the person, or ask someone you trust to stay with the person, until you can get help.     · Someone you know has bipolar disorder and:  ? Starts to give away possessions. ? Is using illegal drugs or drinking alcohol heavily. ? Talks or writes about death, including writing suicide notes or talking about guns, knives, or pills.   ? Talks or writes about hurting someone else. ? Starts to spend a lot of time alone. ? Acts very aggressively or suddenly appears calm. ? Talks about beliefs that are not based in reality (delusions).    Watch closely for changes in your health, and be sure to contact your doctor if:    · You cannot go to your counseling sessions. Where can you learn more? Go to http://cammie-diomedes.info/. Enter K052 in the search box to learn more about \"Bipolar Disorder: Care Instructions. \"  Current as of: May 28, 2019  Content Version: 12.2  © 0994-2959 [employer]. Care instructions adapted under license by [facility] (which disclaims liability or warranty for this information). If you have questions about a medical condition or this instruction, always ask your healthcare professional. [place] [address] any warranty or liability for your use of this information. Patient Education        Psychosis: Care Instructions  Your Care Instructions  A person with psychosis cannot tell the difference between what is real and what is not real. It can cause strange thoughts and behaviors. A person with psychosis may have:  · Delusions. These are beliefs that are not real.  · Hallucinations. These are things that the person sees or hears that are not really there. · Personality changes. Psychosis can be treated with medicines and counseling. It is important to take your medicines exactly as prescribed, even when you feel well. You will need ongoing follow-up care and may need lifelong treatment. When psychosis is not treated, the risks are higher for suicide, a hospital stay, and other problems. Early treatment called coordinated specialty care [facility]) may help a person who is having his or her first episode of psychotic thoughts. Ask your doctor about [address]. Follow-up care is a key part of your treatment and safety.  Be sure to make and go to all appointments, and call your doctor if you are having problems. It's also a good idea to know your test results and keep a list of the medicines you take. How can you care for yourself at home? · Be safe with medicines. Take your medicines exactly as prescribed. Call your doctor if you think you are having a problem with your medicine. You will get more details on the specific medicines your doctor prescribes. · Go to your counseling sessions and follow-up appointments. · Join a self-help or support group. These groups can be very helpful for some people with psychosis. · Get at least 30 minutes of exercise on most days of the week. Walking is a good choice. You also may want to do other activities, such as running, swimming, cycling, or playing tennis or team sports. · Get enough sleep. · Eat a healthy, balanced diet. It includes whole grains, dairy, fruits and vegetables, and protein. Eat a variety of foods from each of those groups. This will get you all the nutrients you need. · Avoid alcohol and drugs. · Keep the numbers for these national suicide hotlines: 8-474-761-TALK (1-502.285.1224) and 4-783-HCYTJZR (6-477.868.7014). If you or someone you know talks about suicide or about feeling hopeless, get help right away. For the caregiver  If you are caring for someone with psychosis, it is important that you take care of yourself as well. · Learn about psychosis. Know the first signs that symptoms are getting worse. · Make a plan with all family members about how to take care of your loved one when his or her symptoms are bad. · Talk about your fears and concerns and those of other family members. · Seek counseling if you need to. · Know your legal rights and the legal rights of your family member or loved one. · Take care of yourself. Stay involved with your own interests, such as your career, hobbies, and friends. Try things like exercise, positive self-talk, relaxation, and deep breathing to help manage your stress.   · Give yourself time to grieve. You may need to deal with emotions such as anger, fear, and frustration. After you work through your feelings, you will be better able to care for yourself and your family. When should you call for help? Call 911 anytime you think you may need emergency care. For example, call if:    · You feel you cannot stop from hurting yourself or someone else.     · Someone who has psychosis displays dangerous behavior, and you think the person might hurt himself or herself or someone else.   [facility] your doctor now or seek immediate medical care if:    · A person with psychosis mentions suicide. If a suicide threat seems real, with a specific plan and a way to carry it out, you should stay with the person, or ask someone you trust to stay with the person, until you get help.     · A person who has psychosis:  ? Starts to give away his or her possessions. ? Uses illegal drugs or drinks alcohol heavily. ? Talks or writes about death, including writing suicide notes and talking about guns, knives, or pills. ? Starts to spend a lot of time alone. ? Acts very aggressively or suddenly appears calm.     · You hear voices or think you see things that are not there.     · You have a sudden change in behavior.     · You have difficulty taking care of yourself or become confused doing simple chores or tasks.    Watch closely for changes in your health, and be sure to contact your doctor if:    · Your symptoms repeatedly upset your daily activities.     · You have symptoms of psychosis that are new or different from those you had before. Where can you learn more? Go to http://cammie-diomedes.info/. Enter D547 in the search box to learn more about \"Psychosis: Care Instructions. \"  Current as of: May 28, 2019  Content Version: 12.2  © 1893-3492 [employer]. Care instructions adapted under license by [employer] (which disclaims liability or warranty for this information).  If you have questions about a medical condition or this instruction, always ask your healthcare professional. [place] [address] any warranty or liability for your use of this information.

## 2020-09-09 ENCOUNTER — VIRTUAL VISIT (OUTPATIENT)
Dept: PRIMARY CARE CLINIC | Age: 25
End: 2020-09-09
Payer: COMMERCIAL

## 2020-09-09 ENCOUNTER — TELEPHONE (OUTPATIENT)
Dept: PRIMARY CARE CLINIC | Age: 25
End: 2020-09-09

## 2020-09-09 DIAGNOSIS — F22 PARANOIA (HCC): ICD-10-CM

## 2020-09-09 DIAGNOSIS — F33.1 MODERATE EPISODE OF RECURRENT MAJOR DEPRESSIVE DISORDER (HCC): ICD-10-CM

## 2020-09-09 DIAGNOSIS — F22 PARANOIA (HCC): Primary | Chronic | ICD-10-CM

## 2020-09-09 PROBLEM — F32.1 CURRENT MODERATE EPISODE OF MAJOR DEPRESSIVE DISORDER (HCC): Status: ACTIVE | Noted: 2020-09-09

## 2020-09-09 PROCEDURE — 99213 OFFICE O/P EST LOW 20 MIN: CPT | Performed by: FAMILY MEDICINE

## 2020-09-09 RX ORDER — OLANZAPINE 15 MG/1
15 TABLET ORAL
Qty: 90 TAB | Refills: 1 | Status: SHIPPED | OUTPATIENT
Start: 2020-09-09 | End: 2020-11-19

## 2020-09-09 RX ORDER — LORAZEPAM 0.5 MG/1
TABLET ORAL
COMMUNITY
End: 2020-09-09 | Stop reason: SDUPTHER

## 2020-09-09 RX ORDER — OLANZAPINE 15 MG/1
1 TABLET ORAL
COMMUNITY
Start: 2020-08-07 | End: 2020-09-09 | Stop reason: SDUPTHER

## 2020-09-09 RX ORDER — LORAZEPAM 0.5 MG/1
TABLET ORAL
Qty: 60 TAB | Refills: 1 | Status: SHIPPED | OUTPATIENT
Start: 2020-09-09 | End: 2020-09-10 | Stop reason: SDUPTHER
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
## 2020-09-09 NOTE — TELEPHONE ENCOUNTER
Pt called saying her lorazepam was not accepted at the [facility],patricia that they cant transfer it over the phone so we need to [facility] on [address]

## 2020-09-10 ENCOUNTER — TELEPHONE (OUTPATIENT)
Dept: PRIMARY CARE CLINIC | Age: 25
End: 2020-09-10

## 2020-09-10 RX ORDER — LORAZEPAM 0.5 MG/1
TABLET ORAL
Qty: 60 TAB | Refills: 1 | Status: SHIPPED | OUTPATIENT
Start: 2020-09-10

## 2020-09-10 NOTE — TELEPHONE ENCOUNTER
pt called and said the pharmacy she had down her no longer works with her ins and she was able to get all of them moved over to [facility], but they would not do it for her lorazepam, so she wants to know if you can resent that one to the [facility] instead.

## 2020-10-08 ENCOUNTER — TELEPHONE (OUTPATIENT)
Dept: PRIMARY CARE CLINIC | Age: 25
End: 2020-10-08

## 2020-10-08 DIAGNOSIS — F90.2 ATTENTION DEFICIT HYPERACTIVITY DISORDER (ADHD), COMBINED TYPE: Primary | ICD-10-CM

## 2020-10-09 PROBLEM — F90.2 ATTENTION DEFICIT HYPERACTIVITY DISORDER (ADHD), COMBINED TYPE: Status: ACTIVE | Noted: 2020-10-09

## 2020-10-09 RX ORDER — DEXTROAMPHETAMINE SACCHARATE, AMPHETAMINE ASPARTATE, DEXTROAMPHETAMINE SULFATE AND AMPHETAMINE SULFATE 7.5; 7.5; 7.5; 7.5 MG/1; MG/1; MG/1; MG/1
TABLET ORAL
COMMUNITY
Start: 2020-09-02 | End: 2020-10-09 | Stop reason: SDUPTHER

## 2020-10-09 RX ORDER — DEXTROAMPHETAMINE SACCHARATE, AMPHETAMINE ASPARTATE, DEXTROAMPHETAMINE SULFATE AND AMPHETAMINE SULFATE 7.5; 7.5; 7.5; 7.5 MG/1; MG/1; MG/1; MG/1
TABLET ORAL
Qty: 60 TAB | Refills: 0 | Status: SHIPPED | OUTPATIENT
Start: 2020-10-09 | End: 2020-11-19 | Stop reason: SDUPTHER

## 2020-10-09 NOTE — TELEPHONE ENCOUNTER
Refill of adderall done #60 to take to appt    Last PDMP Cuco Hutson as Reviewed:  Review User Review Instant Review Result   JUANPABLO GLASS 10/9/2020  3:30 PM Reviewed PDMP [1]

## 2020-11-19 ENCOUNTER — TELEPHONE (OUTPATIENT)
Dept: PRIMARY CARE CLINIC | Age: 25
End: 2020-11-19

## 2020-11-19 ENCOUNTER — VIRTUAL VISIT (OUTPATIENT)
Dept: PRIMARY CARE CLINIC | Age: 25
End: 2020-11-19
Payer: COMMERCIAL

## 2020-11-19 DIAGNOSIS — F90.2 ATTENTION DEFICIT HYPERACTIVITY DISORDER (ADHD), COMBINED TYPE: ICD-10-CM

## 2020-11-19 DIAGNOSIS — F41.9 ANXIETY: Primary | ICD-10-CM

## 2020-11-19 PROCEDURE — 99213 OFFICE O/P EST LOW 20 MIN: CPT | Performed by: NURSE PRACTITIONER

## 2020-11-19 RX ORDER — DEXTROAMPHETAMINE SACCHARATE, AMPHETAMINE ASPARTATE, DEXTROAMPHETAMINE SULFATE AND AMPHETAMINE SULFATE 7.5; 7.5; 7.5; 7.5 MG/1; MG/1; MG/1; MG/1
30 TABLET ORAL 2 TIMES DAILY
Qty: 60 TAB | Refills: 0 | Status: SHIPPED | OUTPATIENT
Start: 2020-12-17 | End: 2022-02-25

## 2020-11-19 RX ORDER — DEXTROAMPHETAMINE SACCHARATE, AMPHETAMINE ASPARTATE, DEXTROAMPHETAMINE SULFATE AND AMPHETAMINE SULFATE 7.5; 7.5; 7.5; 7.5 MG/1; MG/1; MG/1; MG/1
TABLET ORAL
Qty: 60 TAB | Refills: 0 | Status: SHIPPED | OUTPATIENT
Start: 2020-11-19 | End: 2022-02-25

## 2020-11-19 RX ORDER — DEXTROAMPHETAMINE SACCHARATE, AMPHETAMINE ASPARTATE, DEXTROAMPHETAMINE SULFATE AND AMPHETAMINE SULFATE 7.5; 7.5; 7.5; 7.5 MG/1; MG/1; MG/1; MG/1
30 TABLET ORAL 2 TIMES DAILY
Qty: 60 TAB | Refills: 0 | Status: SHIPPED | OUTPATIENT
Start: 2021-01-15 | End: 2022-02-25
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
## 2021-11-05 ENCOUNTER — TELEPHONE (OUTPATIENT)
Dept: PRIMARY CARE CLINIC | Age: 26
End: 2021-11-05

## 2021-11-05 NOTE — TELEPHONE ENCOUNTER
Pt has a cyst under her arm I advised her to call next week to make a same day appt as we cant book one the week before she asked for a nurse to call her in refernce to this.  [phone]

## 2021-11-09 NOTE — TELEPHONE ENCOUNTER
Called [phone] non working number. [phone] Person answered and was wrong number. Left message @ [phone].

## 2022-02-08 DIAGNOSIS — F22 PARANOIA (HCC): ICD-10-CM

## 2022-02-08 RX ORDER — LORAZEPAM 0.5 MG/1
TABLET ORAL
Qty: 60 TABLET | Refills: 1 | OUTPATIENT
Start: 2022-02-08

## 2022-02-08 NOTE — TELEPHONE ENCOUNTER
I haven't seen this patient since 2020 and I have not prescribed her requested medication before. She will need appointment to discuss further before considering refills.

## 2022-02-16 ENCOUNTER — TELEPHONE (OUTPATIENT)
Dept: PRIMARY CARE CLINIC | Age: 27
End: 2022-02-16

## 2022-02-16 NOTE — TELEPHONE ENCOUNTER
----- Message from Dodie Olvera sent at 2/16/2022  8:42 AM EST -----  Subject: Medication Problem    QUESTIONS  Name of Medication? dextroamphetamine-amphetamine (ADDERALL) 30 mg tablet  Patient-reported dosage and instructions? 30 MG   What question or problem do you have with the medication? Patient states   that she was prescribed Adderall 30 MG and Lorazepam 0.5 MG and she needs   all records sent to her [facility] at [phone] . The   fax number [phone]  Preferred Pharmacy? [id number] [address] phone number (if available)? [phone]  Additional Information for Provider?   ---------------------------------------------------------------------------  --------------  CALL BACK INFO  What is the best way for the office to contact you? OK to leave message on   voicemail  Preferred Call Back Phone Number? [phone]  ---------------------------------------------------------------------------  --------------  SCRIPT ANSWERS  Relationship to Patient?  Self

## 2022-02-16 NOTE — TELEPHONE ENCOUNTER
She is just asking for her records to be sent to her psychiatrist if someone can please do that for her. The fax number is provided in the chart below. I haven't seen this patient since 2020 so you most likely will need to pull her records from [facility].

## 2022-02-25 ENCOUNTER — HOSPITAL ENCOUNTER (EMERGENCY)
Age: 27
Discharge: HOME | End: 2022-02-26
Attending: EMERGENCY MEDICINE
Payer: COMMERCIAL

## 2022-02-25 DIAGNOSIS — F41.1 ANXIETY STATE: Primary | ICD-10-CM

## 2022-02-25 DIAGNOSIS — F25.9 SCHIZOAFFECTIVE DISORDER, UNSPECIFIED TYPE (HCC): ICD-10-CM

## 2022-02-25 LAB
ALBUMIN SERPL-MCNC: 3.9 G/DL (ref 3.5–5)
ALBUMIN/GLOB SERPL: 1.2 {RATIO} (ref 1.1–2.2)
ALP SERPL-CCNC: 52 U/L (ref 45–117)
ALT SERPL-CCNC: 53 U/L (ref 12–78)
AMPHET UR QL SCN: NEGATIVE
ANION GAP SERPL CALC-SCNC: 13 MMOL/L (ref 5–15)
APPEARANCE UR: CLEAR
AST SERPL-CCNC: 63 U/L (ref 15–37)
BACTERIA URNS QL MICRO: NEGATIVE /HPF
BARBITURATES UR QL SCN: NEGATIVE
BASOPHILS # BLD: 0.1 K/UL (ref 0–0.1)
BASOPHILS NFR BLD: 1 % (ref 0–1)
BENZODIAZ UR QL: POSITIVE
BILIRUB SERPL-MCNC: 0.6 MG/DL (ref 0.2–1)
BILIRUB UR QL: NEGATIVE
BUN SERPL-MCNC: 9 MG/DL (ref 6–20)
BUN/CREAT SERPL: 12 (ref 12–20)
CALCIUM SERPL-MCNC: 8 MG/DL (ref 8.5–10.1)
CANNABINOIDS UR QL SCN: NEGATIVE
CHLORIDE SERPL-SCNC: 104 MMOL/L (ref 97–108)
CO2 SERPL-SCNC: 23 MMOL/L (ref 21–32)
COCAINE UR QL SCN: NEGATIVE
COLOR UR: ABNORMAL
CREAT SERPL-MCNC: 0.77 MG/DL (ref 0.55–1.02)
DIFFERENTIAL METHOD BLD: ABNORMAL
DRUG SCRN COMMENT,DRGCM: ABNORMAL
EOSINOPHIL # BLD: 0 K/UL (ref 0–0.4)
EOSINOPHIL NFR BLD: 0 % (ref 0–7)
EPITH CASTS URNS QL MICRO: ABNORMAL /LPF
ERYTHROCYTE [DISTWIDTH] IN BLOOD BY AUTOMATED COUNT: 12.3 % (ref 11.5–14.5)
ETHANOL SERPL-MCNC: 126 MG/DL
FLUAV RNA SPEC QL NAA+PROBE: NOT DETECTED
FLUBV RNA SPEC QL NAA+PROBE: NOT DETECTED
GLOBULIN SER CALC-MCNC: 3.3 G/DL (ref 2–4)
GLUCOSE SERPL-MCNC: 92 MG/DL (ref 65–100)
GLUCOSE UR STRIP.AUTO-MCNC: NEGATIVE MG/DL
HCG UR QL: NEGATIVE
HCT VFR BLD AUTO: 43.5 % (ref 35–47)
HGB BLD-MCNC: 14.7 G/DL (ref 11.5–16)
HGB UR QL STRIP: NEGATIVE
IMM GRANULOCYTES # BLD AUTO: 0.1 K/UL (ref 0–0.04)
IMM GRANULOCYTES NFR BLD AUTO: 1 % (ref 0–0.5)
KETONES UR QL STRIP.AUTO: NEGATIVE MG/DL
LEUKOCYTE ESTERASE UR QL STRIP.AUTO: NEGATIVE
LYMPHOCYTES # BLD: 2.6 K/UL (ref 0.8–3.5)
LYMPHOCYTES NFR BLD: 26 % (ref 12–49)
MCH RBC QN AUTO: 31.2 PG (ref 26–34)
MCHC RBC AUTO-ENTMCNC: 33.8 G/DL (ref 30–36.5)
MCV RBC AUTO: 92.4 FL (ref 80–99)
METHADONE UR QL: NEGATIVE
MONOCYTES # BLD: 0.8 K/UL (ref 0–1)
MONOCYTES NFR BLD: 8 % (ref 5–13)
NEUTS SEG # BLD: 6.4 K/UL (ref 1.8–8)
NEUTS SEG NFR BLD: 64 % (ref 32–75)
NITRITE UR QL STRIP.AUTO: NEGATIVE
NRBC # BLD: 0 K/UL (ref 0–0.01)
NRBC BLD-RTO: 0 PER 100 WBC
OPIATES UR QL: NEGATIVE
PCP UR QL: NEGATIVE
PH UR STRIP: 7.5 [PH] (ref 5–8)
PLATELET # BLD AUTO: 235 K/UL (ref 150–400)
PMV BLD AUTO: 9 FL (ref 8.9–12.9)
POTASSIUM SERPL-SCNC: 3.8 MMOL/L (ref 3.5–5.1)
PROT SERPL-MCNC: 7.2 G/DL (ref 6.4–8.2)
PROT UR STRIP-MCNC: ABNORMAL MG/DL
RBC # BLD AUTO: 4.71 M/UL (ref 3.8–5.2)
RBC #/AREA URNS HPF: ABNORMAL /HPF (ref 0–5)
SARS-COV-2, COV2: NOT DETECTED
SODIUM SERPL-SCNC: 140 MMOL/L (ref 136–145)
SP GR UR REFRACTOMETRY: 1.02 (ref 1–1.03)
UA: UC IF INDICATED,UAUC: ABNORMAL
UROBILINOGEN UR QL STRIP.AUTO: 1 EU/DL (ref 0.2–1)
WBC # BLD AUTO: 9.9 K/UL (ref 3.6–11)
WBC URNS QL MICRO: ABNORMAL /HPF (ref 0–4)

## 2022-02-25 PROCEDURE — 36415 COLL VENOUS BLD VENIPUNCTURE: CPT

## 2022-02-25 PROCEDURE — 80307 DRUG TEST PRSMV CHEM ANLYZR: CPT

## 2022-02-25 PROCEDURE — 99285 EMERGENCY DEPT VISIT HI MDM: CPT

## 2022-02-25 PROCEDURE — 81001 URINALYSIS AUTO W/SCOPE: CPT

## 2022-02-25 PROCEDURE — 87636 SARSCOV2 & INF A&B AMP PRB: CPT

## 2022-02-25 PROCEDURE — 85025 COMPLETE CBC W/AUTO DIFF WBC: CPT

## 2022-02-25 PROCEDURE — 80053 COMPREHEN METABOLIC PANEL: CPT

## 2022-02-25 PROCEDURE — 81025 URINE PREGNANCY TEST: CPT

## 2022-02-25 PROCEDURE — 82077 ASSAY SPEC XCP UR&BREATH IA: CPT

## 2022-02-25 RX ORDER — SERTRALINE HYDROCHLORIDE 25 MG/1
TABLET, FILM COATED ORAL
COMMUNITY
Start: 2022-02-16

## 2022-02-26 VITALS
HEART RATE: 78 BPM | WEIGHT: 174 LBS | DIASTOLIC BLOOD PRESSURE: 62 MMHG | HEIGHT: 66 IN | SYSTOLIC BLOOD PRESSURE: 131 MMHG | TEMPERATURE: 98.5 F | BODY MASS INDEX: 27.97 KG/M2 | OXYGEN SATURATION: 97 % | RESPIRATION RATE: 18 BRPM

## 2022-02-26 PROCEDURE — 74011250637 HC RX REV CODE- 250/637: Performed by: EMERGENCY MEDICINE

## 2022-02-26 RX ORDER — ONDANSETRON 4 MG/1
4 TABLET, ORALLY DISINTEGRATING ORAL
Status: DISCONTINUED | OUTPATIENT
Start: 2022-02-26 | End: 2022-02-26 | Stop reason: HOSPADM

## 2022-02-26 RX ORDER — HALOPERIDOL 5 MG/ML
5 INJECTION INTRAMUSCULAR
Status: DISCONTINUED | OUTPATIENT
Start: 2022-02-26 | End: 2022-02-26 | Stop reason: HOSPADM

## 2022-02-26 RX ORDER — LORAZEPAM 1 MG/1
1 TABLET ORAL
Status: COMPLETED | OUTPATIENT
Start: 2022-02-26 | End: 2022-02-26

## 2022-02-26 RX ADMIN — LORAZEPAM 1 MG: 1 TABLET ORAL at 02:34

## 2022-02-26 NOTE — ED NOTES
TRANSFER - OUT REPORT:    Nabil Nowak report given to Lina Ziegler RN (name) on Nabor Heredia  being transferred to [address] (unit) for routine progression of care       Report consisted of patients Situation, Background, Assessment and   Recommendations(SBAR). Information from the following report(s) SBAR, ED Summary, Intake/Output, MAR and Recent Results was reviewed with the receiving nurse. Lines:       Opportunity for questions and clarification was provided.       Patient transported with:   Margo Olson

## 2022-02-26 NOTE — ED NOTES
Verbal shift change report given to ERLINDA Johnson (oncoming nurse) by Lucian Quesada RN (offgoing nurse). Report included the following information SBAR, ED Summary, MAR and Recent Results.

## 2022-02-26 NOTE — PROGRESS NOTES
Forensic Restraints in place, officer at the bedside, sign posted at the head of the bed, nursing supervisor visualized skin. Hands  Red with edema but blanchable and skin intact. Indentations noted bilateral wrists but skin intact. Prior to my arrival cuffs were changed from behind the back to the front of her body, checked and there is enough room to place a finger between cuff and skin, patient denied pain. Sign for metal cuffs posted at the doorway and forensic restraint documentation done by primary nurse.

## 2022-02-26 NOTE — ED NOTES
Patient is medically cleared per PA. Pt results faxed to [facility] (F) #[phone]. Fax confirmation received. Olive Perez [facility] ([place], reached answering service. Waiting for call back.

## 2022-02-26 NOTE — ED NOTES
Patient transferred at this time. Patient taken out by wheelchair and assisted into car with [facility].
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
## 2022-02-26 NOTE — ED TRIAGE NOTES
Pt to ED under ECO, [place] [employer] officer with Pt. Pt arrives in rear facing forensics restraints to bilateral wrists. Pt nor officer know the reasoning for the ECO. Per [employer] she is waiting for it to be faxed over now.

## 2022-03-16 ENCOUNTER — OFFICE VISIT (OUTPATIENT)
Dept: PRIMARY CARE CLINIC | Age: 27
End: 2022-03-16
Payer: COMMERCIAL

## 2022-03-16 VITALS
RESPIRATION RATE: 16 BRPM | TEMPERATURE: 97.3 F | WEIGHT: 206.6 LBS | OXYGEN SATURATION: 96 % | DIASTOLIC BLOOD PRESSURE: 63 MMHG | HEIGHT: 66 IN | HEART RATE: 78 BPM | BODY MASS INDEX: 33.2 KG/M2 | SYSTOLIC BLOOD PRESSURE: 101 MMHG

## 2022-03-16 DIAGNOSIS — F25.9 SCHIZOAFFECTIVE DISORDER, UNSPECIFIED TYPE (HCC): ICD-10-CM

## 2022-03-16 DIAGNOSIS — F41.1 GENERALIZED ANXIETY DISORDER: ICD-10-CM

## 2022-03-16 DIAGNOSIS — F90.2 ATTENTION DEFICIT HYPERACTIVITY DISORDER (ADHD), COMBINED TYPE: Primary | ICD-10-CM

## 2022-03-16 PROCEDURE — 99214 OFFICE O/P EST MOD 30 MIN: CPT | Performed by: FAMILY MEDICINE

## 2022-03-16 RX ORDER — DEXTROAMPHETAMINE SACCHARATE, AMPHETAMINE ASPARTATE, DEXTROAMPHETAMINE SULFATE AND AMPHETAMINE SULFATE 7.5; 7.5; 7.5; 7.5 MG/1; MG/1; MG/1; MG/1
30 TABLET ORAL 2 TIMES DAILY
COMMUNITY

## 2022-03-16 RX ORDER — HYDROXYZINE PAMOATE 50 MG/1
50 CAPSULE ORAL
Qty: 30 CAPSULE | Refills: 0 | Status: SHIPPED | OUTPATIENT
Start: 2022-03-16 | End: 2022-03-30

## 2022-03-16 NOTE — PROGRESS NOTES
Chief Complaint   Patient presents with    New Patient     Patient would like one time refill of ativan and adderall      Visit Vitals  /63 (BP 1 Location: Left upper arm, BP Patient Position: Sitting, BP Cuff Size: Adult)   Pulse 78   Temp 97.3 °F (36.3 °C) (Temporal)   Resp 16   Ht 5' 6\" (1.676 m)   Wt 206 lb 9.6 oz (93.7 kg)   SpO2 96%   BMI 33.35 kg/m²     1. \"Have you been to the ER, urgent care clinic since your last visit? Hospitalized since your last visit? \" Yes When: 02/25/2022    2. \"Have you seen or consulted any other health care providers outside of the [address] [place] [address] since your last visit? \" Yes, [facility]. 2/25/22     3. For patients aged 39-70: Has the patient had a colonoscopy / FIT/ Cologuard? No      If the patient is female:    4. For patients aged 41-77: Has the patient had a mammogram within the past 2 years? No      5. For patients aged 21-65: Has the patient had a pap smear?  Yes - no Care Gap present
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
## 2022-03-19 PROBLEM — F90.2 ATTENTION DEFICIT HYPERACTIVITY DISORDER (ADHD), COMBINED TYPE: Status: ACTIVE | Noted: 2020-10-09

## 2022-03-19 PROBLEM — F32.1 CURRENT MODERATE EPISODE OF MAJOR DEPRESSIVE DISORDER (HCC): Status: ACTIVE | Noted: 2020-09-09

## 2022-03-19 PROBLEM — F25.9 SCHIZOAFFECTIVE DISORDER (HCC): Status: ACTIVE | Noted: 2020-01-12

## 2022-03-20 PROBLEM — F22 PARANOIA (HCC): Status: ACTIVE | Noted: 2020-09-09

## 2022-03-24 PROBLEM — F41.1 GENERALIZED ANXIETY DISORDER: Status: ACTIVE | Noted: 2022-03-16

## 2023-02-22 ENCOUNTER — HOSPITAL ENCOUNTER (EMERGENCY)
Age: 28
Discharge: HOME | End: 2023-02-23
Attending: EMERGENCY MEDICINE
Payer: COMMERCIAL

## 2023-02-22 DIAGNOSIS — R00.2 PALPITATIONS: Primary | ICD-10-CM

## 2023-02-22 PROCEDURE — 99284 EMERGENCY DEPT VISIT MOD MDM: CPT

## 2023-02-23 VITALS
RESPIRATION RATE: 18 BRPM | WEIGHT: 190 LBS | HEIGHT: 67 IN | TEMPERATURE: 98.8 F | DIASTOLIC BLOOD PRESSURE: 89 MMHG | OXYGEN SATURATION: 97 % | SYSTOLIC BLOOD PRESSURE: 136 MMHG | HEART RATE: 86 BPM | BODY MASS INDEX: 29.82 KG/M2

## 2023-02-23 VITALS
HEIGHT: 67 IN | DIASTOLIC BLOOD PRESSURE: 79 MMHG | WEIGHT: 190 LBS | SYSTOLIC BLOOD PRESSURE: 122 MMHG | RESPIRATION RATE: 19 BRPM | TEMPERATURE: 98.1 F | HEART RATE: 79 BPM | OXYGEN SATURATION: 98 % | BODY MASS INDEX: 29.82 KG/M2

## 2023-02-23 DIAGNOSIS — R00.2 PALPITATIONS: Primary | ICD-10-CM

## 2023-02-23 DIAGNOSIS — F41.1 ANXIETY STATE: ICD-10-CM

## 2023-02-23 LAB
ANION GAP SERPL CALC-SCNC: 9 MMOL/L (ref 5–15)
BASOPHILS # BLD: 0.1 K/UL (ref 0–1)
BASOPHILS NFR BLD: 1 % (ref 0–1)
BUN SERPL-MCNC: 12 MG/DL (ref 6–20)
BUN/CREAT SERPL: 18 (ref 12–20)
CALCIUM SERPL-MCNC: 9.5 MG/DL (ref 8.6–10)
CHLORIDE SERPL-SCNC: 105 MMOL/L (ref 98–107)
CO2 SERPL-SCNC: 24 MMOL/L (ref 22–29)
CREAT SERPL-MCNC: 0.66 MG/DL (ref 0.5–0.9)
DIFFERENTIAL METHOD BLD: ABNORMAL
EOSINOPHIL # BLD: 0.1 K/UL (ref 0–0.4)
EOSINOPHIL NFR BLD: 1 %
ERYTHROCYTE [DISTWIDTH] IN BLOOD BY AUTOMATED COUNT: 14.3 % (ref 11.5–14.5)
GLUCOSE SERPL-MCNC: 90 MG/DL (ref 65–100)
HCT VFR BLD AUTO: 44.9 % (ref 35–47)
HGB BLD-MCNC: 15.1 G/DL (ref 11.5–16)
IMM GRANULOCYTES # BLD AUTO: 0 K/UL (ref 0–0.04)
IMM GRANULOCYTES NFR BLD AUTO: 0 % (ref 0–0.5)
LYMPHOCYTES # BLD: 3 K/UL (ref 0.8–3.5)
LYMPHOCYTES NFR BLD: 28 % (ref 12–49)
MCH RBC QN AUTO: 30.8 PG (ref 26–34)
MCHC RBC AUTO-ENTMCNC: 33.6 G/DL (ref 30–36.5)
MCV RBC AUTO: 91.4 FL (ref 80–99)
MONOCYTES # BLD: 0.8 K/UL (ref 0–1)
MONOCYTES NFR BLD: 8 % (ref 5–13)
NEUTS SEG # BLD: 6.7 K/UL (ref 1.8–8)
NEUTS SEG NFR BLD: 62 % (ref 32–75)
NRBC # BLD: 0 K/UL (ref 0–0.01)
NRBC BLD-RTO: 0 PER 100 WBC
PLATELET # BLD AUTO: 278 K/UL (ref 150–400)
PMV BLD AUTO: 9.8 FL (ref 8.9–12.9)
POTASSIUM SERPL-SCNC: 4.1 MMOL/L (ref 3.5–5.1)
RBC # BLD AUTO: 4.91 M/UL (ref 3.8–5.2)
SODIUM SERPL-SCNC: 138 MMOL/L (ref 136–145)
TROPONIN I BLD-MCNC: <0.04 NG/ML (ref 0–0.08)
TSH SERPL DL<=0.05 MIU/L-ACNC: 2.74 UIU/ML (ref 0.27–4.2)
WBC # BLD AUTO: 10.7 K/UL (ref 3.6–11)

## 2023-02-23 PROCEDURE — 74011250637 HC RX REV CODE- 250/637: Performed by: EMERGENCY MEDICINE

## 2023-02-23 PROCEDURE — 93005 ELECTROCARDIOGRAM TRACING: CPT

## 2023-02-23 PROCEDURE — 36415 COLL VENOUS BLD VENIPUNCTURE: CPT

## 2023-02-23 PROCEDURE — 99283 EMERGENCY DEPT VISIT LOW MDM: CPT

## 2023-02-23 PROCEDURE — 80048 BASIC METABOLIC PNL TOTAL CA: CPT

## 2023-02-23 PROCEDURE — 85025 COMPLETE CBC W/AUTO DIFF WBC: CPT

## 2023-02-23 PROCEDURE — 84443 ASSAY THYROID STIM HORMONE: CPT

## 2023-02-23 RX ORDER — LORAZEPAM 1 MG/1
1 TABLET ORAL ONCE
Status: COMPLETED | OUTPATIENT
Start: 2023-02-23 | End: 2023-02-23

## 2023-02-23 RX ORDER — HYDROXYZINE 50 MG/1
50 TABLET, FILM COATED ORAL
Qty: 20 TABLET | Refills: 0 | OUTPATIENT
Start: 2023-02-23 | End: 2023-02-25

## 2023-02-23 RX ADMIN — LORAZEPAM 1 MG: 1 TABLET ORAL at 21:10

## 2023-02-23 NOTE — ED TRIAGE NOTES
Pt arrived ambulatory with EMS cc of heart palpitations for over 24 hours. Reports its triggered by a thought but she is not having anxiety. Reports chest pain earlier today but none now. Reports SOB when she attempted she washed dishes but since resolved. Reports daily smoker 1 pack daily.
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
## 2023-02-24 NOTE — ED TRIAGE NOTES
Pt presents with c/o palpitations. Patient was evaluated last night for same complaint. Pt has been taking her mothers nitro (last dose was mn last night). Pt also wants a referral for home health because she said she is bed bound.  Patient arrived ambulating with EMS

## 2023-02-25 ENCOUNTER — APPOINTMENT (OUTPATIENT)
Dept: CT IMAGING | Age: 28
End: 2023-02-25
Attending: EMERGENCY MEDICINE
Payer: COMMERCIAL

## 2023-02-25 ENCOUNTER — HOSPITAL ENCOUNTER (OUTPATIENT)
Age: 28
Setting detail: OBSERVATION
Discharge: HOME | End: 2023-02-25
Attending: EMERGENCY MEDICINE | Admitting: FAMILY MEDICINE
Payer: COMMERCIAL

## 2023-02-25 ENCOUNTER — APPOINTMENT (OUTPATIENT)
Dept: MRI IMAGING | Age: 28
End: 2023-02-25
Attending: FAMILY MEDICINE
Payer: COMMERCIAL

## 2023-02-25 VITALS
SYSTOLIC BLOOD PRESSURE: 133 MMHG | HEART RATE: 71 BPM | WEIGHT: 190 LBS | BODY MASS INDEX: 29.82 KG/M2 | RESPIRATION RATE: 16 BRPM | HEIGHT: 67 IN | DIASTOLIC BLOOD PRESSURE: 79 MMHG | OXYGEN SATURATION: 94 % | TEMPERATURE: 97.9 F

## 2023-02-25 DIAGNOSIS — R22.0 TONGUE SWELLING: ICD-10-CM

## 2023-02-25 DIAGNOSIS — R20.0 LEFT SIDED NUMBNESS: Primary | ICD-10-CM

## 2023-02-25 DIAGNOSIS — R53.1 LEFT-SIDED WEAKNESS: ICD-10-CM

## 2023-02-25 PROBLEM — R20.2 NUMBNESS AND TINGLING: Status: ACTIVE | Noted: 2023-02-25

## 2023-02-25 PROBLEM — Z87.898 HISTORY OF BENZODIAZEPINE USE: Status: ACTIVE | Noted: 2023-02-25

## 2023-02-25 PROBLEM — F10.10 ACUTE ALCOHOL ABUSE: Status: ACTIVE | Noted: 2023-02-25

## 2023-02-25 PROBLEM — R51.9 HEADACHE: Status: ACTIVE | Noted: 2023-02-25

## 2023-02-25 LAB
ANION GAP SERPL CALC-SCNC: 12 MMOL/L (ref 5–15)
ATRIAL RATE: 78 BPM
ATRIAL RATE: 78 BPM
ATRIAL RATE: 87 BPM
BASOPHILS # BLD: 0.1 K/UL (ref 0–1)
BASOPHILS NFR BLD: 1 % (ref 0–1)
BUN SERPL-MCNC: 12 MG/DL (ref 6–20)
BUN/CREAT SERPL: 20 (ref 12–20)
CALCIUM SERPL-MCNC: 9.2 MG/DL (ref 8.6–10)
CALCULATED P AXIS, ECG09: 28 DEGREES
CALCULATED P AXIS, ECG09: 29 DEGREES
CALCULATED P AXIS, ECG09: 34 DEGREES
CALCULATED R AXIS, ECG10: 10 DEGREES
CALCULATED R AXIS, ECG10: 20 DEGREES
CALCULATED R AXIS, ECG10: 4 DEGREES
CALCULATED T AXIS, ECG11: 22 DEGREES
CALCULATED T AXIS, ECG11: 28 DEGREES
CALCULATED T AXIS, ECG11: 36 DEGREES
CHLORIDE SERPL-SCNC: 104 MMOL/L (ref 98–107)
CO2 SERPL-SCNC: 24 MMOL/L (ref 22–29)
CREAT SERPL-MCNC: 0.61 MG/DL (ref 0.5–0.9)
DIAGNOSIS, 93000: NORMAL
DIFFERENTIAL METHOD BLD: ABNORMAL
EOSINOPHIL # BLD: 0.2 K/UL (ref 0–0.4)
EOSINOPHIL NFR BLD: 1 %
ERYTHROCYTE [DISTWIDTH] IN BLOOD BY AUTOMATED COUNT: 14.5 % (ref 11.5–14.5)
GLUCOSE BLD STRIP.AUTO-MCNC: 98 MG/DL (ref 65–117)
GLUCOSE SERPL-MCNC: 90 MG/DL (ref 65–100)
HCG SERPL-ACNC: <1 MIU/ML
HCT VFR BLD AUTO: 46.4 % (ref 35–47)
HGB BLD-MCNC: 15.4 G/DL (ref 11.5–16)
IMM GRANULOCYTES # BLD AUTO: 0.1 K/UL (ref 0–0.04)
IMM GRANULOCYTES NFR BLD AUTO: 0 % (ref 0–0.5)
LYMPHOCYTES # BLD: 3.2 K/UL (ref 0.8–3.5)
LYMPHOCYTES NFR BLD: 28 % (ref 12–49)
MAGNESIUM SERPL-MCNC: 1.7 MG/DL (ref 1.6–2.6)
MCH RBC QN AUTO: 30.8 PG (ref 26–34)
MCHC RBC AUTO-ENTMCNC: 33.2 G/DL (ref 30–36.5)
MCV RBC AUTO: 92.8 FL (ref 80–99)
MONOCYTES # BLD: 0.8 K/UL (ref 0–1)
MONOCYTES NFR BLD: 7 % (ref 5–13)
NEUTS SEG # BLD: 7.3 K/UL (ref 1.8–8)
NEUTS SEG NFR BLD: 63 % (ref 32–75)
NRBC # BLD: 0 K/UL (ref 0–0.01)
NRBC BLD-RTO: 0 PER 100 WBC
P-R INTERVAL, ECG05: 142 MS
P-R INTERVAL, ECG05: 142 MS
P-R INTERVAL, ECG05: 160 MS
PLATELET # BLD AUTO: 278 K/UL (ref 150–400)
PMV BLD AUTO: 9.7 FL (ref 8.9–12.9)
POTASSIUM SERPL-SCNC: 4.1 MMOL/L (ref 3.5–5.1)
Q-T INTERVAL, ECG07: 368 MS
Q-T INTERVAL, ECG07: 372 MS
Q-T INTERVAL, ECG07: 392 MS
QRS DURATION, ECG06: 78 MS
QRS DURATION, ECG06: 84 MS
QRS DURATION, ECG06: 88 MS
QTC CALCULATION (BEZET), ECG08: 424 MS
QTC CALCULATION (BEZET), ECG08: 442 MS
QTC CALCULATION (BEZET), ECG08: 446 MS
RBC # BLD AUTO: 5 M/UL (ref 3.8–5.2)
SERVICE CMNT-IMP: NORMAL
SODIUM SERPL-SCNC: 140 MMOL/L (ref 136–145)
UR CULT HOLD, URHOLD: NORMAL
VENTRICULAR RATE, ECG03: 78 BPM
VENTRICULAR RATE, ECG03: 78 BPM
VENTRICULAR RATE, ECG03: 87 BPM
WBC # BLD AUTO: 11.6 K/UL (ref 3.6–11)

## 2023-02-25 PROCEDURE — 93005 ELECTROCARDIOGRAM TRACING: CPT

## 2023-02-25 PROCEDURE — 70450 CT HEAD/BRAIN W/O DYE: CPT

## 2023-02-25 PROCEDURE — 70551 MRI BRAIN STEM W/O DYE: CPT

## 2023-02-25 PROCEDURE — 0042T CT CODE NEURO PERF W CBF: CPT

## 2023-02-25 PROCEDURE — 74011250636 HC RX REV CODE- 250/636: Performed by: EMERGENCY MEDICINE

## 2023-02-25 PROCEDURE — 82962 GLUCOSE BLOOD TEST: CPT

## 2023-02-25 PROCEDURE — 97165 OT EVAL LOW COMPLEX 30 MIN: CPT

## 2023-02-25 PROCEDURE — 74011250637 HC RX REV CODE- 250/637: Performed by: EMERGENCY MEDICINE

## 2023-02-25 PROCEDURE — 97162 PT EVAL MOD COMPLEX 30 MIN: CPT

## 2023-02-25 PROCEDURE — 85025 COMPLETE CBC W/AUTO DIFF WBC: CPT

## 2023-02-25 PROCEDURE — 80048 BASIC METABOLIC PNL TOTAL CA: CPT

## 2023-02-25 PROCEDURE — 84702 CHORIONIC GONADOTROPIN TEST: CPT

## 2023-02-25 PROCEDURE — 96374 THER/PROPH/DIAG INJ IV PUSH: CPT

## 2023-02-25 PROCEDURE — 70496 CT ANGIOGRAPHY HEAD: CPT

## 2023-02-25 PROCEDURE — 36415 COLL VENOUS BLD VENIPUNCTURE: CPT

## 2023-02-25 PROCEDURE — 99285 EMERGENCY DEPT VISIT HI MDM: CPT

## 2023-02-25 PROCEDURE — 74011000636 HC RX REV CODE- 636: Performed by: EMERGENCY MEDICINE

## 2023-02-25 PROCEDURE — 99222 1ST HOSP IP/OBS MODERATE 55: CPT | Performed by: PSYCHIATRY & NEUROLOGY

## 2023-02-25 PROCEDURE — G0378 HOSPITAL OBSERVATION PER HR: HCPCS

## 2023-02-25 PROCEDURE — 97530 THERAPEUTIC ACTIVITIES: CPT

## 2023-02-25 PROCEDURE — 83735 ASSAY OF MAGNESIUM: CPT

## 2023-02-25 RX ORDER — ACETAMINOPHEN 650 MG/1
650 SUPPOSITORY RECTAL
Status: DISCONTINUED | OUTPATIENT
Start: 2023-02-25 | End: 2023-02-25

## 2023-02-25 RX ORDER — KETOROLAC TROMETHAMINE 30 MG/ML
30 INJECTION, SOLUTION INTRAMUSCULAR; INTRAVENOUS
Status: COMPLETED | OUTPATIENT
Start: 2023-02-25 | End: 2023-02-25

## 2023-02-25 RX ORDER — ACETAMINOPHEN 325 MG/1
650 TABLET ORAL
Status: DISCONTINUED | OUTPATIENT
Start: 2023-02-25 | End: 2023-02-25 | Stop reason: HOSPADM

## 2023-02-25 RX ORDER — ASPIRIN 325 MG
325 TABLET ORAL
Status: COMPLETED | OUTPATIENT
Start: 2023-02-25 | End: 2023-02-25

## 2023-02-25 RX ADMIN — KETOROLAC TROMETHAMINE 30 MG: 30 INJECTION, SOLUTION INTRAMUSCULAR at 04:25

## 2023-02-25 RX ADMIN — ASPIRIN 325 MG: 325 TABLET ORAL at 03:51

## 2023-02-25 RX ADMIN — IOPAMIDOL 100 ML: 755 INJECTION, SOLUTION INTRAVENOUS at 03:28

## 2023-02-25 NOTE — ED NOTES
Pt to CT via stretcher and cardiac monitor with this primary RN, nancy. TeleNeuro on with pt for exam and reports no meds at this time and pt able to verbalize understanding. Pt able to pivot well with steady gait to/from stretcher to CT table. Pt back on CT table for CTA.

## 2023-02-25 NOTE — ED NOTES
Pt able to ambulate to/from restroom with steady gait. Urine sample on hold in lab as there are no orders at this time. Patient medicated with ASA per order, post passing dysphagia screen. Patient tolerated well.

## 2023-02-25 NOTE — PROGRESS NOTES
[address]  [phone]    Hospitalist Progress Note      NAME: Sylvia Oliver   :  1995  MRM:  [id number]    Date/Time of service 2023  11:45 AM          Assessment and Plan:     Numbness and tingling / Headache -Symptoms resolving. Not a TPA candidate. Unremarkable tele and neuro checks. Screening shows she is positive for benzo and alcohol. No CVA risk factors. Normal CT, CA, MRI Brain. I reject Dx that this is a TIA/CVA. Maybe atypical migraine? Neurology consult. DC home later, with advice to avoid drug and alcohol abuse. Schizoaffective disorder / Paranoia / Anxiety and depression / Attention deficit hyperactivity disorder (ADHD), combined type / Sleep disorder breathing - Patient needs more intensive treatment and counseling. Acute alcohol abuse / History of benzodiazepine use / Tobacco use - Advise cessation of all these agents       Subjective:     Chief Complaint:  Still states L side numb and tongue heavy. ROS:  (bold if positive, if negative)    Tolerating PT  Tolerating Diet        Objective:     Last 24hrs VS reviewed since prior progress note.  Most recent are:    Visit Vitals  BP (!) 116/54   Pulse 67   Temp 97.9 °F (36.6 °C)   Resp 22   Ht 5' 7\" (1.702 m)   Wt 86.2 kg (190 lb)   SpO2 95%   BMI 29.76 kg/m²     SpO2 Readings from Last 6 Encounters:   23 95%   23 98%   23 97%   22 96%   22 97%   20 100%        No intake or output data in the 24 hours ending 23 0910     Physical Exam:    Gen:  Well-developed, well-nourished, in no acute distress  HEENT:  Pink conjunctivae, PERRL, hearing intact to voice, moist mucous membranes  Neck:  Supple, without masses, thyroid non-tender  Resp:  No accessory muscle use, clear breath sounds without wheezes rales or rhonchi  Card:  No murmurs, normal S1, S2 without thrills, bruits or peripheral edema  Abd:  Soft, non-tender, non-distended, normoactive bowel sounds are present, no mass  Lymph:  No cervical or inguinal adenopathy  Musc:  No cyanosis or clubbing  Skin:  No rashes or ulcers, skin turgor is good  Neuro:  Cranial nerves are grossly intact, no focal motor weakness, follows commands appropriately  Psych:  Poor insight, oriented to person, place and time, alert    Telemetry reviewed:   normal sinus rhythm  __________________________________________________________________  Medications Reviewed: (see below)  Medications:     Current Facility-Administered Medications   Medication Dose Route Frequency    acetaminophen (TYLENOL) tablet 650 mg  650 mg Oral Q4H PRN     Current Outpatient Medications   Medication Sig    hydrOXYzine HCL (ATARAX) 50 mg tablet Take 1 Tablet by mouth every six (6) hours as needed for Anxiety for up to 10 days. Lab Data Reviewed: (see below)  Lab Review:     Recent Labs     02/25/23 0302 02/23/23  0006   WBC 11.6* 10.7   HGB 15.4 15.1   HCT 46.4 44.9    278     Recent Labs     02/25/23 0302 02/23/23 0006    138   K 4.1 4.1    105   CO2 24 24   GLU 90 90   BUN 12 12   CREA 0.61 0.66   CA 9.2 9.5   MG 1.7  --      Lab Results   Component Value Date/Time    Glucose (POC) 98 02/25/2023 03:02 AM     No results for input(s): PH, PCO2, PO2, HCO3, FIO2 in the last 72 hours. No results for input(s): INR, INREXT in the last 72 hours. All Micro Results       Procedure Component Value Units Date/Time    URINE CULTURE HOLD SAMPLE [[id number]] Collected: 02/25/23 0353    Order Status: Completed Specimen: Urine Updated: 02/25/23 0357     Urine culture hold       Urine on hold in Microbiology dept for 2 days. If unpreserved urine is submitted, it cannot be used for addtional testing after 24 hours, recollection will be required.                   Other pertinent lab: none    Total time spent with patient: 45 Minutes I personally rounded from 11:10 to 11:45 AM in addition to the time spent by my partner, Dr Marily Coto, earlier today. I personally reviewed chart, notes, data and current medications in the medical record. I have personally examined and treated the patient at bedside during this period. I personally made additional diagnoses, placed additional orders and had additional discussions.                   Care Plan discussed with: Patient, Care Manager, Nursing Staff, and >50% of time spent in counseling and coordination of care    Discussed:  Care Plan and D/C Planning    Prophylaxis:  Lovenox and H2B/PPI    Disposition:  Home w/Family           ___________________________________________________    Attending Physician: Uday Velasquez MD

## 2023-02-25 NOTE — ED TRIAGE NOTES
Pt arrives via EMS for left sided tingling. Pt reports heaviness to left side of tongue only. Pt states onset x 1 hr. Pt reports upper/lower left extremity numbness/tingling. Pt states \"the left side of my brain hurts\" but it went away. Pt states that it is inside her brain not just her head. pt reports she was seen for palpitations x the last 2 nights. Last known well 0130. Pt states she was looking over some stressful documents when symptoms occurred.

## 2023-02-25 NOTE — PROGRESS NOTES
Pt to be discharged home. Pt has transportation set up for discharge. IV discontinued and all discharge education reviewed with patient including follow up care, medications, and stroke risk reduction/signs and symptoms. Pt verbalized understanding of all discharge education including medication review. Pt agreeable to discharge. No pain or SOB at time of discharge. Pt to follow up with pcp.
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
## 2023-02-25 NOTE — DISCHARGE INSTRUCTIONS
Patient Discharge Instructions    Mark Fleming / [id number] : 1995    Admitted 2023 Discharged: 2023     Primary Diagnoses  Problem List as of 2023 Date Reviewed: 2023               Numbness and tingling        Acute alcohol abuse        History of benzodiazepine use        Headache        Generalized anxiety disorder        Attention deficit hyperactivity disorder (ADHD), combined type        Paranoia ([address]        Current moderate episode of major depressive disorder ([address]        Schizoaffective disorder ([address]        Tobacco use        Sleep disorder breathing       Take Home Medications       It is important that you take the medication exactly as they are prescribed. Keep your medication in the bottles provided by the pharmacist and keep a list of the medication names, dosages, and times to be taken in your wallet. Do not take other medications without consulting your doctor. What to do at Home    Recommended diet: Regular Diet    Recommended activity: Activity as tolerated    If you experience worse symptoms, please follow up with psychiatry. Follow-up with your PCP in a few weeks    Follow-up Information       Follow up With Specialties Details Why Contact Info    Maritza García MD [facility] Medicine Schedule an appointment as soon as possible for a visit in 1 week(s)  [address] [place] [address]  [phone]               Information obtained by :  I understand that if any problems occur once I am at home I am to contact my physician. I understand and acknowledge receipt of the instructions indicated above.                                                                                                                                            Physician's or R.N.'s Signature                                                                  Date/Time Patient or Representative Signature                                                          Date/Time Private Vehicle
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
## 2023-02-25 NOTE — DISCHARGE SUMMARY
Physician Discharge Summary     Patient ID:  Alethea Londono  [id number]  32 y.o.  1995    Admit date: 2/25/2023    Discharge date of service and time: 2/25/2023  Greater than 30 minutes were spent providing discharge related services for this patient    Admission Diagnoses: Numbness and tingling [R20.0, R20.2]    Discharge Diagnoses:    Principal Diagnosis     Numbness and tingling                                              Hospital Course and other diagnoses  Numbness and tingling / Headache -Symptoms resolving. Not a TPA candidate. Unremarkable tele and neuro checks. Screening shows she is positive for benzo and alcohol. No CVA risk factors. Normal CT, CA, MRI Brain. I reject Dx that this is a TIA/CVA. Maybe atypical migraine? Neurology consult. DC home later, with advice to avoid drug and alcohol abuse. Schizoaffective disorder / Paranoia / Anxiety and depression / Attention deficit hyperactivity disorder (ADHD), combined type / Sleep disorder breathing - Patient needs more intensive treatment and counseling. Acute alcohol abuse / History of benzodiazepine use / Tobacco use - Advise cessation of all these agents     PCP: Juanito Caldwell MD    Consults: Neurology    Significant Diagnostic Studies: See Hospital Course    Discharged home in improved condition.     Discharge Exam:  BP (!) 116/54   Pulse 67   Temp 97.9 °F (36.6 °C)   Resp 22   Ht 5' 7\" (1.702 m)   Wt 86.2 kg (190 lb)   SpO2 95%   BMI 29.76 kg/m²      Gen:  Well-developed, well-nourished, in no acute distress  HEENT:  Pink conjunctivae, PERRL, hearing intact to voice, moist mucous membranes  Neck:  Supple, without masses, thyroid non-tender  Resp:  No accessory muscle use, clear breath sounds without wheezes rales or rhonchi  Card:  No murmurs, normal S1, S2 without thrills, bruits or peripheral edema  Abd:  Soft, non-tender, non-distended, normoactive bowel sounds are present, no mass  Lymph:  No cervical or inguinal adenopathy  Musc:  No cyanosis or clubbing  Skin:  No rashes or ulcers, skin turgor is good  Neuro:  Cranial nerves are grossly intact, no focal motor weakness, follows commands appropriately  Psych:  Poor insight, oriented to person, place and time, alert    Patient Instructions:   Current Discharge Medication List        STOP taking these medications       hydrOXYzine HCL (ATARAX) 50 mg tablet Comments:   Reason for Stopping:             Activity: Activity as tolerated  Diet: Regular Diet  Wound Care: None needed    Follow-up with your PCP and psychiatry in a few weeks.   Follow-up tests/labs - none    Signed:  Zuleyma Boswell MD  2/25/2023  11:55 AM

## 2023-02-25 NOTE — ED NOTES
TRANSFER - OUT REPORT:    Verbal report given to Christina Nam RN (name) on Darleen Merrill  being transferred to [facility] ED (unit) for routine progression of care       Report consisted of patients Situation, Background, Assessment and   Recommendations(SBAR). Information from the following report(s) ED Summary was reviewed with the receiving nurse. Lines:   Peripheral IV 02/25/23 Right Antecubital (Active)   Site Assessment Clean, dry, & intact 02/25/23 0300   Phlebitis Assessment 0 02/25/23 0300   Infiltration Assessment 0 02/25/23 0300   Dressing Status Clean, dry, & intact 02/25/23 0300   Hub Color/Line Status Pink 02/25/23 0300   Action Taken Blood drawn 02/25/23 0300   Alcohol Cap Used No 02/25/23 0300        Opportunity for questions and clarification was provided.       Patient transported with:   Monitor   Hospital to Home EMTs
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
## 2023-10-07 ENCOUNTER — HOSPITAL ENCOUNTER (EMERGENCY)
Facility: HOSPITAL | Age: 28
Discharge: HOME | End: 2023-10-07
Attending: EMERGENCY MEDICINE
Payer: COMMERCIAL

## 2023-10-07 VITALS
WEIGHT: 266 LBS | TEMPERATURE: 98.2 F | HEIGHT: 67 IN | BODY MASS INDEX: 41.75 KG/M2 | HEART RATE: 91 BPM | OXYGEN SATURATION: 97 % | SYSTOLIC BLOOD PRESSURE: 156 MMHG | RESPIRATION RATE: 18 BRPM | DIASTOLIC BLOOD PRESSURE: 96 MMHG

## 2023-10-07 DIAGNOSIS — R05.9 COUGH, UNSPECIFIED TYPE: Primary | ICD-10-CM

## 2023-10-07 DIAGNOSIS — R06.2 WHEEZING: ICD-10-CM

## 2023-10-07 PROCEDURE — 99283 EMERGENCY DEPT VISIT LOW MDM: CPT

## 2023-10-07 PROCEDURE — 6360000002 HC RX W HCPCS: Performed by: EMERGENCY MEDICINE

## 2023-10-07 PROCEDURE — 6370000000 HC RX 637 (ALT 250 FOR IP): Performed by: EMERGENCY MEDICINE

## 2023-10-07 RX ORDER — ALBUTEROL SULFATE 90 UG/1
2 AEROSOL, METERED RESPIRATORY (INHALATION) EVERY 4 HOURS PRN
Qty: 18 G | Refills: 0 | Status: SHIPPED | OUTPATIENT
Start: 2023-10-07

## 2023-10-07 RX ORDER — DIAZEPAM 10 MG/1
TABLET ORAL
COMMUNITY
Start: 2023-10-04

## 2023-10-07 RX ORDER — QUETIAPINE FUMARATE 200 MG/1
200 TABLET, FILM COATED ORAL 2 TIMES DAILY
COMMUNITY

## 2023-10-07 RX ORDER — SERTRALINE HYDROCHLORIDE 100 MG/1
100 TABLET, FILM COATED ORAL DAILY
COMMUNITY
Start: 2023-10-04

## 2023-10-07 RX ADMIN — IPRATROPIUM BROMIDE AND ALBUTEROL SULFATE 1 DOSE: 2.5; .5 SOLUTION RESPIRATORY (INHALATION) at 02:34

## 2023-10-07 ASSESSMENT — LIFESTYLE VARIABLES
HOW MANY STANDARD DRINKS CONTAINING ALCOHOL DO YOU HAVE ON A TYPICAL DAY: PATIENT DOES NOT DRINK
HOW OFTEN DO YOU HAVE A DRINK CONTAINING ALCOHOL: NEVER

## 2023-10-07 ASSESSMENT — PAIN - FUNCTIONAL ASSESSMENT: PAIN_FUNCTIONAL_ASSESSMENT: NONE - DENIES PAIN

## 2023-10-07 NOTE — ED TRIAGE NOTES
Pt brought to ED via EMS complaining of \"severe coughing\" x5 days. Pt states when she coughs it feels like her \"ribs press into her lungs\" and during one coughing episode a piece of her \"flesh felt like it was coming up through her throat\".  Pt also believes she may have HPV because she \"had diarrhea 3 days ago and is coughing\"

## 2023-10-07 NOTE — ED NOTES
Pt given snacks and lying supine. Pt encouraged to sit upright d/t cough but refuses. States she is more comfortable lying supine with HOB lowered.      Unknown JENNIFER Sousa  10/07/23 [id number]
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
## 2023-10-07 NOTE — ED NOTES
Discharge instructions reviewed and pt teaching completed. (1) prescription(s) discussed. Pt informed to follow up with PCP regarding today's visit. Pt instructed to report to ED if symptoms return or worsen.         Riddhi Chan RN  10/07/23 6741

## 2024-02-16 ENCOUNTER — HOSPITAL ENCOUNTER (EMERGENCY)
Facility: HOSPITAL | Age: 29
Discharge: HOME | End: 2024-02-16
Attending: EMERGENCY MEDICINE
Payer: COMMERCIAL

## 2024-02-16 ENCOUNTER — APPOINTMENT (OUTPATIENT)
Facility: HOSPITAL | Age: 29
End: 2024-02-16
Payer: COMMERCIAL

## 2024-02-16 VITALS
WEIGHT: 266 LBS | HEIGHT: 67 IN | TEMPERATURE: 98.6 F | OXYGEN SATURATION: 97 % | HEART RATE: 99 BPM | BODY MASS INDEX: 41.75 KG/M2 | RESPIRATION RATE: 20 BRPM | DIASTOLIC BLOOD PRESSURE: 101 MMHG | SYSTOLIC BLOOD PRESSURE: 129 MMHG

## 2024-02-16 DIAGNOSIS — S62.346A CLOSED NONDISPLACED FRACTURE OF BASE OF FIFTH METACARPAL BONE OF RIGHT HAND, INITIAL ENCOUNTER: Primary | ICD-10-CM

## 2024-02-16 PROCEDURE — 29125 APPL SHORT ARM SPLINT STATIC: CPT

## 2024-02-16 PROCEDURE — 73130 X-RAY EXAM OF HAND: CPT

## 2024-02-16 PROCEDURE — 6370000000 HC RX 637 (ALT 250 FOR IP)

## 2024-02-16 PROCEDURE — 99283 EMERGENCY DEPT VISIT LOW MDM: CPT

## 2024-02-16 PROCEDURE — 73090 X-RAY EXAM OF FOREARM: CPT

## 2024-02-16 RX ORDER — IBUPROFEN 600 MG/1
600 TABLET ORAL
Status: COMPLETED | OUTPATIENT
Start: 2024-02-16 | End: 2024-02-16

## 2024-02-16 RX ORDER — AMOXICILLIN AND CLAVULANATE POTASSIUM 875; 125 MG/1; MG/1
1 TABLET, FILM COATED ORAL 2 TIMES DAILY
Qty: 20 TABLET | Refills: 0 | Status: SHIPPED | OUTPATIENT
Start: 2024-02-16 | End: 2024-02-26

## 2024-02-16 RX ADMIN — IBUPROFEN 600 MG: 600 TABLET, FILM COATED ORAL at 18:55

## 2024-02-16 ASSESSMENT — PAIN - FUNCTIONAL ASSESSMENT: PAIN_FUNCTIONAL_ASSESSMENT: 0-10

## 2024-02-16 ASSESSMENT — PAIN DESCRIPTION - ORIENTATION: ORIENTATION: RIGHT

## 2024-02-16 ASSESSMENT — PAIN DESCRIPTION - LOCATION: LOCATION: HAND

## 2024-02-16 ASSESSMENT — PAIN DESCRIPTION - DESCRIPTORS: DESCRIPTORS: ACHING

## 2024-02-16 ASSESSMENT — PAIN SCALES - GENERAL: PAINLEVEL_OUTOF10: 3
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
## 2024-02-17 NOTE — ED NOTES
Pt given all discharge materials.  Pt verbalized understanding to follow up with ortho.  Pt also verbalized understanding to  prescription augmentin.  Pt has all belongings and ambulated off unit with a steady gait.

## 2024-02-17 NOTE — ED NOTES
This RN to take over pt's care.  At this time, waiting for xray results.  Pt sitting in lounge chair with no acute distress.  Pt focused on personal cell phone.

## 2024-02-17 NOTE — DISCHARGE INSTRUCTIONS
You were seen today in the emergency department for a hand injury.  X-ray imaging showed a fracture of your fifth metacarpal.  You must follow-up with an orthopedic hand specialist.  Call the number above and schedule an appointment as soon as possible.  You should also take the antibiotic as prescribed.  You should keep the splint on, in place and dry until you follow-up with orthopedics for ongoing management.  He can take ibuprofen or Tylenol as needed for pain.

## 2024-02-17 NOTE — ED NOTES
Ulnar gutter splint applied to R arm.  Pt tolerated procedure well.  Pt stated to RN that forarm hurt as well.  Provider to order R FA xray.

## 2024-09-16 ENCOUNTER — HOSPITAL ENCOUNTER (EMERGENCY)
Facility: HOSPITAL | Age: 29
Discharge: HOME | End: 2024-09-16
Attending: STUDENT IN AN ORGANIZED HEALTH CARE EDUCATION/TRAINING PROGRAM | Admitting: STUDENT IN AN ORGANIZED HEALTH CARE EDUCATION/TRAINING PROGRAM
Payer: COMMERCIAL

## 2024-09-16 ENCOUNTER — APPOINTMENT (OUTPATIENT)
Facility: HOSPITAL | Age: 29
End: 2024-09-16
Payer: COMMERCIAL

## 2024-09-16 VITALS
SYSTOLIC BLOOD PRESSURE: 117 MMHG | HEART RATE: 98 BPM | DIASTOLIC BLOOD PRESSURE: 69 MMHG | BODY MASS INDEX: 40.81 KG/M2 | HEIGHT: 67 IN | RESPIRATION RATE: 14 BRPM | TEMPERATURE: 98.1 F | OXYGEN SATURATION: 97 % | WEIGHT: 260 LBS

## 2024-09-16 DIAGNOSIS — T39.95XA ANALGESIC REBOUND HEADACHE: Primary | ICD-10-CM

## 2024-09-16 DIAGNOSIS — G44.40 ANALGESIC REBOUND HEADACHE: Primary | ICD-10-CM

## 2024-09-16 LAB
ALBUMIN SERPL-MCNC: 4.2 G/DL (ref 3.5–5.2)
ALBUMIN/GLOB SERPL: 1.5 (ref 1.1–2.2)
ALP SERPL-CCNC: 51 U/L (ref 35–104)
ALT SERPL-CCNC: 78 U/L (ref 10–35)
AMPHET UR QL SCN: NEGATIVE
ANION GAP SERPL CALC-SCNC: 15 MMOL/L (ref 2–12)
APAP SERPL-MCNC: <5 UG/ML (ref 10–30)
APPEARANCE UR: CLEAR
AST SERPL-CCNC: 77 U/L (ref 10–35)
BACTERIA URNS QL MICRO: ABNORMAL /HPF
BARBITURATES UR QL SCN: NEGATIVE
BASOPHILS # BLD: 0.1 K/UL (ref 0–1)
BASOPHILS NFR BLD: 1 % (ref 0–1)
BENZODIAZ UR QL: POSITIVE
BILIRUB SERPL-MCNC: <0.2 MG/DL (ref 0.2–1)
BILIRUB UR QL: NEGATIVE
BUN SERPL-MCNC: 18 MG/DL (ref 6–20)
BUN/CREAT SERPL: 28 (ref 12–20)
CALCIUM SERPL-MCNC: 9.7 MG/DL (ref 8.6–10)
CANNABINOIDS UR QL SCN: NEGATIVE
CHLORIDE SERPL-SCNC: 101 MMOL/L (ref 98–107)
CO2 SERPL-SCNC: 21 MMOL/L (ref 22–29)
COCAINE UR QL SCN: NEGATIVE
COLOR UR: ABNORMAL
CREAT SERPL-MCNC: 0.64 MG/DL (ref 0.5–0.9)
DIFFERENTIAL METHOD BLD: ABNORMAL
EKG ATRIAL RATE: 110 BPM
EKG DIAGNOSIS: NORMAL
EKG P AXIS: 61 DEGREES
EKG P-R INTERVAL: 142 MS
EKG Q-T INTERVAL: 348 MS
EKG QRS DURATION: 74 MS
EKG QTC CALCULATION (BAZETT): 470 MS
EKG R AXIS: 68 DEGREES
EKG T AXIS: 50 DEGREES
EKG VENTRICULAR RATE: 110 BPM
EOSINOPHIL # BLD: 0.5 K/UL (ref 0–0.4)
EOSINOPHIL NFR BLD: 3 %
EPITH CASTS URNS QL MICRO: ABNORMAL /LPF
ERYTHROCYTE [DISTWIDTH] IN BLOOD BY AUTOMATED COUNT: 13.3 % (ref 11.5–14.5)
ETHANOL SERPL-MCNC: <10 MG/DL (ref 0–10)
GLOBULIN SER CALC-MCNC: 2.8 G/DL (ref 2–4)
GLUCOSE SERPL-MCNC: 105 MG/DL (ref 65–100)
GLUCOSE UR STRIP.AUTO-MCNC: NEGATIVE MG/DL
HCG UR QL: NEGATIVE
HCT VFR BLD AUTO: 44.1 % (ref 35–47)
HGB BLD-MCNC: 15.1 G/DL (ref 11.5–16)
HGB UR QL STRIP: ABNORMAL
IMM GRANULOCYTES # BLD AUTO: 0.1 K/UL (ref 0–0.04)
IMM GRANULOCYTES NFR BLD AUTO: 1 % (ref 0–0.5)
KETONES UR QL STRIP.AUTO: NEGATIVE MG/DL
LEUKOCYTE ESTERASE UR QL STRIP.AUTO: NEGATIVE
LYMPHOCYTES # BLD: 3.1 K/UL (ref 0.8–3.5)
LYMPHOCYTES NFR BLD: 20 % (ref 12–49)
Lab: ABNORMAL
MCH RBC QN AUTO: 31.9 PG (ref 26–34)
MCHC RBC AUTO-ENTMCNC: 34.2 G/DL (ref 30–36.5)
MCV RBC AUTO: 93 FL (ref 80–99)
METHADONE UR QL: NEGATIVE
MONOCYTES # BLD: 1.4 K/UL (ref 0–1)
MONOCYTES NFR BLD: 9 % (ref 5–13)
MUCOUS THREADS URNS QL MICRO: ABNORMAL /LPF
NEUTS SEG # BLD: 10.2 K/UL (ref 1.8–8)
NEUTS SEG NFR BLD: 66 % (ref 32–75)
NITRITE UR QL STRIP.AUTO: NEGATIVE
NRBC # BLD: 0 K/UL (ref 0–0.01)
NRBC BLD-RTO: 0 PER 100 WBC
OPIATES UR QL: NEGATIVE
PCP UR QL: NEGATIVE
PH UR STRIP: 6 (ref 5–8)
PLATELET # BLD AUTO: 270 K/UL (ref 150–400)
PMV BLD AUTO: 9.1 FL (ref 8.9–12.9)
POTASSIUM SERPL-SCNC: 4.3 MMOL/L (ref 3.5–5.1)
PROT SERPL-MCNC: 7 G/DL (ref 6.4–8.3)
PROT UR STRIP-MCNC: NEGATIVE MG/DL
RBC # BLD AUTO: 4.74 M/UL (ref 3.8–5.2)
RBC #/AREA URNS HPF: ABNORMAL /HPF
SALICYLATES SERPL-MCNC: 24.2 MG/DL (ref 3–10)
SODIUM SERPL-SCNC: 137 MMOL/L (ref 136–145)
SP GR UR REFRACTOMETRY: 1.02 (ref 1–1.03)
SPECIMEN HOLD: NORMAL
TROPONIN T SERPL HS-MCNC: <6 NG/L (ref 0–14)
TSH SERPL DL<=0.05 MIU/L-ACNC: 2.14 UIU/ML (ref 0.27–4.2)
UROBILINOGEN UR QL STRIP.AUTO: 0.2 EU/DL (ref 0.2–1)
WBC # BLD AUTO: 15.4 K/UL (ref 3.6–11)
WBC URNS QL MICRO: ABNORMAL /HPF (ref 0–4)

## 2024-09-16 PROCEDURE — 93010 ELECTROCARDIOGRAM REPORT: CPT | Performed by: STUDENT IN AN ORGANIZED HEALTH CARE EDUCATION/TRAINING PROGRAM

## 2024-09-16 PROCEDURE — 84484 ASSAY OF TROPONIN QUANT: CPT

## 2024-09-16 PROCEDURE — 80143 DRUG ASSAY ACETAMINOPHEN: CPT

## 2024-09-16 PROCEDURE — 36415 COLL VENOUS BLD VENIPUNCTURE: CPT

## 2024-09-16 PROCEDURE — 2580000003 HC RX 258: Performed by: REGISTERED NURSE

## 2024-09-16 PROCEDURE — 93005 ELECTROCARDIOGRAM TRACING: CPT | Performed by: STUDENT IN AN ORGANIZED HEALTH CARE EDUCATION/TRAINING PROGRAM

## 2024-09-16 PROCEDURE — 80053 COMPREHEN METABOLIC PANEL: CPT

## 2024-09-16 PROCEDURE — 80179 DRUG ASSAY SALICYLATE: CPT

## 2024-09-16 PROCEDURE — 99284 EMERGENCY DEPT VISIT MOD MDM: CPT

## 2024-09-16 PROCEDURE — 96360 HYDRATION IV INFUSION INIT: CPT

## 2024-09-16 PROCEDURE — 85025 COMPLETE CBC W/AUTO DIFF WBC: CPT

## 2024-09-16 PROCEDURE — 82077 ASSAY SPEC XCP UR&BREATH IA: CPT

## 2024-09-16 PROCEDURE — 81025 URINE PREGNANCY TEST: CPT

## 2024-09-16 PROCEDURE — 80307 DRUG TEST PRSMV CHEM ANLYZR: CPT

## 2024-09-16 PROCEDURE — 84443 ASSAY THYROID STIM HORMONE: CPT

## 2024-09-16 PROCEDURE — 6370000000 HC RX 637 (ALT 250 FOR IP): Performed by: REGISTERED NURSE

## 2024-09-16 PROCEDURE — 81001 URINALYSIS AUTO W/SCOPE: CPT

## 2024-09-16 PROCEDURE — 70450 CT HEAD/BRAIN W/O DYE: CPT

## 2024-09-16 RX ORDER — LIDOCAINE 50 MG/G
1 PATCH TOPICAL DAILY
Qty: 10 PATCH | Refills: 0 | Status: SHIPPED | OUTPATIENT
Start: 2024-09-16 | End: 2024-09-26

## 2024-09-16 RX ORDER — METHOCARBAMOL 500 MG/1
750 TABLET, FILM COATED ORAL
Status: COMPLETED | OUTPATIENT
Start: 2024-09-16 | End: 2024-09-16

## 2024-09-16 RX ORDER — 0.9 % SODIUM CHLORIDE 0.9 %
1000 INTRAVENOUS SOLUTION INTRAVENOUS ONCE
Status: COMPLETED | OUTPATIENT
Start: 2024-09-16 | End: 2024-09-16

## 2024-09-16 RX ORDER — LIDOCAINE 4 G/G
2 PATCH TOPICAL DAILY
Status: DISCONTINUED | OUTPATIENT
Start: 2024-09-16 | End: 2024-09-16 | Stop reason: HOSPADM

## 2024-09-16 RX ORDER — METHOCARBAMOL 750 MG/1
750 TABLET, FILM COATED ORAL 4 TIMES DAILY PRN
Qty: 40 TABLET | Refills: 0 | Status: SHIPPED | OUTPATIENT
Start: 2024-09-16 | End: 2024-09-26

## 2024-09-16 RX ADMIN — METHOCARBAMOL TABLETS 750 MG: 500 TABLET, COATED ORAL at 07:39

## 2024-09-16 RX ADMIN — SODIUM CHLORIDE 1000 ML: 9 INJECTION, SOLUTION INTRAVENOUS at 07:54

## 2024-09-16 ASSESSMENT — PAIN - FUNCTIONAL ASSESSMENT: PAIN_FUNCTIONAL_ASSESSMENT: 0-10

## 2024-09-16 ASSESSMENT — PAIN DESCRIPTION - LOCATION: LOCATION: BACK;CHEST

## 2024-09-16 ASSESSMENT — PAIN SCALES - GENERAL: PAINLEVEL_OUTOF10: 10

## 2024-09-17 ENCOUNTER — TELEPHONE (OUTPATIENT)
Dept: PRIMARY CARE CLINIC | Facility: CLINIC | Age: 29
End: 2024-09-17

## 2024-10-04 ENCOUNTER — TELEPHONE (OUTPATIENT)
Dept: PRIMARY CARE CLINIC | Facility: CLINIC | Age: 29
End: 2024-10-04

## 2024-10-04 NOTE — TELEPHONE ENCOUNTER
----- Message from Mt DAO sent at 10/3/2024  4:34 PM EDT -----  Regarding: ECC Appointment Request  ECC Appointment Request    Patient needs appointment for ECC Appointment Type: New to Provider.    Patient Requested Dates(s): oct 7 ,8 ,2024  Patient Requested Time: after 2 oclock  Provider Name:Harrison Rodgers MD    Reason for Appointment Request: New Patient - Requested Provider unavailable    Back pain for  many weeks now  --------------------------------------------------------------------------------------------------------------------------    Relationship to Patient: Guardian Diana     Call Back Information: OK to leave message on voicemail  Preferred Call Back Number: Phone  [phone]

## 2024-11-17 ENCOUNTER — HOSPITAL ENCOUNTER (EMERGENCY)
Facility: HOSPITAL | Age: 29
Discharge: HOME | End: 2024-11-17
Attending: STUDENT IN AN ORGANIZED HEALTH CARE EDUCATION/TRAINING PROGRAM
Payer: COMMERCIAL

## 2024-11-17 ENCOUNTER — APPOINTMENT (OUTPATIENT)
Facility: HOSPITAL | Age: 29
End: 2024-11-17
Payer: COMMERCIAL

## 2024-11-17 VITALS
HEART RATE: 96 BPM | OXYGEN SATURATION: 95 % | DIASTOLIC BLOOD PRESSURE: 78 MMHG | TEMPERATURE: 98.1 F | RESPIRATION RATE: 20 BRPM | BODY MASS INDEX: 42.59 KG/M2 | HEIGHT: 66 IN | SYSTOLIC BLOOD PRESSURE: 114 MMHG | WEIGHT: 265 LBS

## 2024-11-17 DIAGNOSIS — R10.13 ABDOMINAL PAIN, EPIGASTRIC: Primary | ICD-10-CM

## 2024-11-17 DIAGNOSIS — M62.838 SPASM OF MUSCLE: ICD-10-CM

## 2024-11-17 DIAGNOSIS — S39.012A STRAIN OF LUMBAR REGION, INITIAL ENCOUNTER: ICD-10-CM

## 2024-11-17 DIAGNOSIS — K76.0 HEPATIC STEATOSIS: ICD-10-CM

## 2024-11-17 DIAGNOSIS — R73.9 HYPERGLYCEMIA: ICD-10-CM

## 2024-11-17 LAB
ALBUMIN SERPL-MCNC: 4.3 G/DL (ref 3.5–5.2)
ALBUMIN/GLOB SERPL: 1.7 (ref 1.1–2.2)
ALP SERPL-CCNC: 66 U/L (ref 35–104)
ALT SERPL-CCNC: 66 U/L (ref 10–35)
ANION GAP SERPL CALC-SCNC: 16 MMOL/L (ref 2–12)
APPEARANCE UR: CLEAR
AST SERPL-CCNC: 45 U/L (ref 10–35)
BACTERIA URNS QL MICRO: ABNORMAL /HPF
BASOPHILS # BLD: 0.1 K/UL (ref 0–1)
BASOPHILS NFR BLD: 1 % (ref 0–1)
BILIRUB SERPL-MCNC: 0.2 MG/DL (ref 0.2–1)
BILIRUB UR QL: NEGATIVE
BUN SERPL-MCNC: 17 MG/DL (ref 6–20)
BUN/CREAT SERPL: 24 (ref 12–20)
CALCIUM SERPL-MCNC: 10 MG/DL (ref 8.6–10)
CHLORIDE SERPL-SCNC: 97 MMOL/L (ref 98–107)
CO2 SERPL-SCNC: 24 MMOL/L (ref 22–29)
COLOR UR: ABNORMAL
CREAT SERPL-MCNC: 0.71 MG/DL (ref 0.5–0.9)
DIFFERENTIAL METHOD BLD: ABNORMAL
EOSINOPHIL # BLD: 0.7 K/UL (ref 0–0.4)
EOSINOPHIL NFR BLD: 6 %
EPITH CASTS URNS QL MICRO: ABNORMAL /LPF
ERYTHROCYTE [DISTWIDTH] IN BLOOD BY AUTOMATED COUNT: 13.6 % (ref 11.5–14.5)
GLOBULIN SER CALC-MCNC: 2.5 G/DL (ref 2–4)
GLUCOSE SERPL-MCNC: 258 MG/DL (ref 65–100)
GLUCOSE UR STRIP.AUTO-MCNC: >1000 MG/DL
HCG UR QL: NEGATIVE
HCT VFR BLD AUTO: 46 % (ref 35–47)
HGB BLD-MCNC: 15.5 G/DL (ref 11.5–16)
HGB UR QL STRIP: NEGATIVE
IMM GRANULOCYTES # BLD AUTO: 0.2 K/UL (ref 0–0.04)
IMM GRANULOCYTES NFR BLD AUTO: 1 % (ref 0–0.5)
KETONES UR QL STRIP.AUTO: 15 MG/DL
LEUKOCYTE ESTERASE UR QL STRIP.AUTO: NEGATIVE
LIPASE SERPL-CCNC: 89 U/L (ref 13–60)
LYMPHOCYTES # BLD: 3.1 K/UL (ref 0.8–3.5)
LYMPHOCYTES NFR BLD: 26 % (ref 12–49)
MCH RBC QN AUTO: 31.3 PG (ref 26–34)
MCHC RBC AUTO-ENTMCNC: 33.7 G/DL (ref 30–36.5)
MCV RBC AUTO: 92.9 FL (ref 80–99)
MONOCYTES # BLD: 1.4 K/UL (ref 0–1)
MONOCYTES NFR BLD: 12 % (ref 5–13)
NEUTS SEG # BLD: 6.3 K/UL (ref 1.8–8)
NEUTS SEG NFR BLD: 54 % (ref 32–75)
NITRITE UR QL STRIP.AUTO: NEGATIVE
NRBC # BLD: 0 K/UL (ref 0–0.01)
NRBC BLD-RTO: 0 PER 100 WBC
PH UR STRIP: 6 (ref 5–8)
PLATELET # BLD AUTO: 206 K/UL (ref 150–400)
PMV BLD AUTO: 9.6 FL (ref 8.9–12.9)
POTASSIUM SERPL-SCNC: 4.8 MMOL/L (ref 3.5–5.1)
PROT SERPL-MCNC: 6.8 G/DL (ref 6.4–8.3)
PROT UR STRIP-MCNC: NEGATIVE MG/DL
RBC # BLD AUTO: 4.95 M/UL (ref 3.8–5.2)
RBC #/AREA URNS HPF: ABNORMAL /HPF
SODIUM SERPL-SCNC: 137 MMOL/L (ref 136–145)
SP GR UR REFRACTOMETRY: 1.02 (ref 1–1.03)
TROPONIN T SERPL HS-MCNC: <6 NG/L (ref 0–14)
URINE CULTURE IF INDICATED: ABNORMAL
UROBILINOGEN UR QL STRIP.AUTO: 0.2 EU/DL (ref 0.2–1)
WBC # BLD AUTO: 11.7 K/UL (ref 3.6–11)
WBC URNS QL MICRO: ABNORMAL /HPF (ref 0–4)

## 2024-11-17 PROCEDURE — 2580000003 HC RX 258: Performed by: STUDENT IN AN ORGANIZED HEALTH CARE EDUCATION/TRAINING PROGRAM

## 2024-11-17 PROCEDURE — 85025 COMPLETE CBC W/AUTO DIFF WBC: CPT

## 2024-11-17 PROCEDURE — 80053 COMPREHEN METABOLIC PANEL: CPT

## 2024-11-17 PROCEDURE — 74177 CT ABD & PELVIS W/CONTRAST: CPT

## 2024-11-17 PROCEDURE — 81025 URINE PREGNANCY TEST: CPT

## 2024-11-17 PROCEDURE — 83690 ASSAY OF LIPASE: CPT

## 2024-11-17 PROCEDURE — 6370000000 HC RX 637 (ALT 250 FOR IP): Performed by: STUDENT IN AN ORGANIZED HEALTH CARE EDUCATION/TRAINING PROGRAM

## 2024-11-17 PROCEDURE — 6360000004 HC RX CONTRAST MEDICATION: Performed by: STUDENT IN AN ORGANIZED HEALTH CARE EDUCATION/TRAINING PROGRAM

## 2024-11-17 PROCEDURE — 99285 EMERGENCY DEPT VISIT HI MDM: CPT

## 2024-11-17 PROCEDURE — 2500000003 HC RX 250 WO HCPCS: Performed by: STUDENT IN AN ORGANIZED HEALTH CARE EDUCATION/TRAINING PROGRAM

## 2024-11-17 PROCEDURE — 96361 HYDRATE IV INFUSION ADD-ON: CPT

## 2024-11-17 PROCEDURE — 36415 COLL VENOUS BLD VENIPUNCTURE: CPT

## 2024-11-17 PROCEDURE — 96374 THER/PROPH/DIAG INJ IV PUSH: CPT

## 2024-11-17 PROCEDURE — 84484 ASSAY OF TROPONIN QUANT: CPT

## 2024-11-17 PROCEDURE — 81001 URINALYSIS AUTO W/SCOPE: CPT

## 2024-11-17 RX ORDER — METHOCARBAMOL 750 MG/1
750 TABLET, FILM COATED ORAL 3 TIMES DAILY
Qty: 30 TABLET | Refills: 0 | Status: SHIPPED | OUTPATIENT
Start: 2024-11-17 | End: 2024-11-27

## 2024-11-17 RX ORDER — METHOCARBAMOL 500 MG/1
1500 TABLET, FILM COATED ORAL
Status: COMPLETED | OUTPATIENT
Start: 2024-11-17 | End: 2024-11-17

## 2024-11-17 RX ORDER — KETOROLAC TROMETHAMINE 30 MG/ML
15 INJECTION, SOLUTION INTRAMUSCULAR; INTRAVENOUS ONCE
Status: DISCONTINUED | OUTPATIENT
Start: 2024-11-17 | End: 2024-11-17

## 2024-11-17 RX ORDER — FAMOTIDINE 20 MG/1
20 TABLET, FILM COATED ORAL 2 TIMES DAILY
Qty: 60 TABLET | Refills: 0 | Status: SHIPPED | OUTPATIENT
Start: 2024-11-17

## 2024-11-17 RX ORDER — IOPAMIDOL 755 MG/ML
100 INJECTION, SOLUTION INTRAVASCULAR
Status: COMPLETED | OUTPATIENT
Start: 2024-11-17 | End: 2024-11-17

## 2024-11-17 RX ORDER — 0.9 % SODIUM CHLORIDE 0.9 %
1000 INTRAVENOUS SOLUTION INTRAVENOUS ONCE
Status: COMPLETED | OUTPATIENT
Start: 2024-11-17 | End: 2024-11-17

## 2024-11-17 RX ORDER — ACETAMINOPHEN 500 MG
1000 TABLET ORAL
Status: COMPLETED | OUTPATIENT
Start: 2024-11-17 | End: 2024-11-17

## 2024-11-17 RX ADMIN — ACETAMINOPHEN 1000 MG: 500 TABLET ORAL at 14:14

## 2024-11-17 RX ADMIN — SODIUM CHLORIDE 1000 ML: 9 INJECTION, SOLUTION INTRAVENOUS at 11:18

## 2024-11-17 RX ADMIN — SODIUM CHLORIDE, PRESERVATIVE FREE 20 MG: 5 INJECTION INTRAVENOUS at 11:24

## 2024-11-17 RX ADMIN — IOPAMIDOL 100 ML: 755 INJECTION, SOLUTION INTRAVENOUS at 11:47

## 2024-11-17 RX ADMIN — METHOCARBAMOL TABLETS 1500 MG: 500 TABLET, COATED ORAL at 11:18

## 2024-11-17 ASSESSMENT — PAIN DESCRIPTION - PAIN TYPE: TYPE: ACUTE PAIN

## 2024-11-17 ASSESSMENT — PAIN - FUNCTIONAL ASSESSMENT: PAIN_FUNCTIONAL_ASSESSMENT: PREVENTS OR INTERFERES SOME ACTIVE ACTIVITIES AND ADLS

## 2024-11-17 ASSESSMENT — PAIN DESCRIPTION - ORIENTATION
ORIENTATION: RIGHT;LEFT
ORIENTATION: RIGHT;LEFT;LOWER

## 2024-11-17 ASSESSMENT — PAIN SCALES - GENERAL
PAINLEVEL_OUTOF10: 10
PAINLEVEL_OUTOF10: 3
PAINLEVEL_OUTOF10: 3
PAINLEVEL_OUTOF10: 4

## 2024-11-17 ASSESSMENT — LIFESTYLE VARIABLES
HOW MANY STANDARD DRINKS CONTAINING ALCOHOL DO YOU HAVE ON A TYPICAL DAY: 1 OR 2
HOW OFTEN DO YOU HAVE A DRINK CONTAINING ALCOHOL: 2-4 TIMES A MONTH

## 2024-11-17 ASSESSMENT — PAIN DESCRIPTION - LOCATION
LOCATION: ABDOMEN
LOCATION: BACK
LOCATION: BACK

## 2024-11-17 ASSESSMENT — PAIN DESCRIPTION - DESCRIPTORS
DESCRIPTORS: BURNING
DESCRIPTORS: DISCOMFORT
DESCRIPTORS: DISCOMFORT;ACHING

## 2024-11-17 ASSESSMENT — PAIN DESCRIPTION - FREQUENCY: FREQUENCY: CONTINUOUS

## 2024-11-17 NOTE — ED PROVIDER NOTES
(*)     All other components within normal limits   LIPASE - Abnormal; Notable for the following components:    Lipase 89 (*)     All other components within normal limits   URINALYSIS WITH REFLEX TO CULTURE - Abnormal; Notable for the following components:    Glucose, Ur >1000 (*)     Ketones, Urine 15 (*)     Epithelial Cells, UA MODERATE (*)     BACTERIA, URINE 1+ (*)     All other components within normal limits   TROPONIN T   POC PREGNANCY UR-QUAL   POC PREGNANCY UR-QUAL       All other labs were within normal range or not returned as of this dictation.    EMERGENCY DEPARTMENT COURSE and DIFFERENTIAL DIAGNOSIS/MDM:   Vitals:    Vitals:    11/17/24 1030 11/17/24 1217   BP: (!) 142/69 (!) 142/92   Pulse: (!) 112 (!) 102   Resp: 22    Temp: 97.9 °F (36.6 °C)    TempSrc: Oral    SpO2: 99%    Weight: 120.2 kg (265 lb)    Height: 1.676 m (5' 6\")            Medical Decision Making  Amount and/or Complexity of Data Reviewed  Labs: ordered. Decision-making details documented in ED Course.  Radiology: ordered. Decision-making details documented in ED Course.    Risk  OTC drugs.  Prescription drug management.        Epigastric Abdominal Pain  Patient here with complaint of acute epigastric abdominal pain. Differential diagnosis broad and includes biliary colic, gastritis, peptic ulcer disease, pancreatitis, gastroparesis. Considered bowel obstruction, MI, intestinal ischemia, abdominal hernia, pneumonia, and malignancy, but less likely based on history and exam. No history of trauma. No associated nausea/vomiting, less likely pancreatitis. Abdominal exam without peritoneal signs. Well appearing. No evidence of acute abdomen at this time.     Plan: labs, UA, CT abd/pelvis, serial reassessment        REASSESSMENT   MEDICATIONS GIVEN:   Medications   acetaminophen (TYLENOL) tablet 1,000 mg (has no administration in time range)   sodium chloride 0.9 % bolus 1,000 mL (1,000 mLs IntraVENous New Bag 11/17/24 1118)

## 2024-11-17 NOTE — ED TRIAGE NOTES
Patient reports having a rash on her abdomen that burns and has the same feeling as she did when she had shingles at age 6. Patient states the rash is not visible at this time, no rash present on evaluation. Patient also reports back spasms.

## 2024-11-17 NOTE — DISCHARGE INSTRUCTIONS
You have been evaluated in the Emergency Department today for abdominal pain. Your evaluation did not show evidence of medical conditions requiring emergent intervention at this time.    Please schedule an appointment with your primary care physician as soon as possible for re-evaluation.    Return to the Emergency Department if you experience worsening or uncontrolled pain, fevers 100.4°F or greater uncontrolled with acetaminophen or ibuprofen, recurrent vomiting, inability to tolerate food or fluids by mouth, bloody stools or vomit, black or tarry stools, or any other concerning symptoms.    Thank you for choosing us for your care.

## 2024-11-18 ENCOUNTER — TELEPHONE (OUTPATIENT)
Dept: PRIMARY CARE CLINIC | Facility: CLINIC | Age: 29
End: 2024-11-18

## 2024-11-18 NOTE — TELEPHONE ENCOUNTER
----- Message from Agueda BRAN sent at 11/18/2024 12:58 PM EST -----  Regarding: ECC Appointment Request  ECC Appointment Request    Patient needs appointment for ECC Appointment Type: New Patient.    Patient Requested Dates(s):  as soon as possible  Patient Requested Time: Any time  Provider Name: Any doctor from [facility]    Reason for Appointment Request: New Patient - No appointments available during search  --------------------------------------------------------------------------------------------------------------------------    Relationship to Patient: Guardian Mother Adrienne    Call Back Information: OK to leave message on voicemail  Preferred Call Back Number: Phone [phone] \[phone]

## 2025-02-28 ENCOUNTER — HOSPITAL ENCOUNTER (EMERGENCY)
Facility: HOSPITAL | Age: 30
Discharge: ANOTHER HOSPITAL | End: 2025-03-01
Attending: STUDENT IN AN ORGANIZED HEALTH CARE EDUCATION/TRAINING PROGRAM
Payer: COMMERCIAL

## 2025-02-28 DIAGNOSIS — F22 PARANOID (HCC): Primary | ICD-10-CM

## 2025-02-28 PROCEDURE — 99285 EMERGENCY DEPT VISIT HI MDM: CPT

## 2025-02-28 ASSESSMENT — PAIN SCALES - GENERAL: PAINLEVEL_OUTOF10: 2

## 2025-02-28 ASSESSMENT — PAIN - FUNCTIONAL ASSESSMENT: PAIN_FUNCTIONAL_ASSESSMENT: 0-10

## 2025-03-01 ENCOUNTER — APPOINTMENT (OUTPATIENT)
Facility: HOSPITAL | Age: 30
End: 2025-03-01
Payer: COMMERCIAL

## 2025-03-01 ENCOUNTER — HOSPITAL ENCOUNTER (INPATIENT)
Facility: HOSPITAL | Age: 30
LOS: 1 days | Discharge: LEFT BEFORE DISCHARGE | DRG: 750 | End: 2025-03-02
Attending: PSYCHIATRY & NEUROLOGY | Admitting: PSYCHIATRY & NEUROLOGY
Payer: COMMERCIAL

## 2025-03-01 VITALS
RESPIRATION RATE: 16 BRPM | HEIGHT: 67 IN | DIASTOLIC BLOOD PRESSURE: 80 MMHG | WEIGHT: 289 LBS | TEMPERATURE: 98.6 F | SYSTOLIC BLOOD PRESSURE: 135 MMHG | HEART RATE: 80 BPM | BODY MASS INDEX: 45.36 KG/M2 | OXYGEN SATURATION: 100 %

## 2025-03-01 PROBLEM — F20.9 SCHIZOPHRENIA (HCC): Status: ACTIVE | Noted: 2025-03-01

## 2025-03-01 LAB
ALBUMIN SERPL-MCNC: 4.2 G/DL (ref 3.5–5.2)
ALBUMIN/GLOB SERPL: 1.6 (ref 1.1–2.2)
ALP SERPL-CCNC: 64 U/L (ref 35–104)
ALT SERPL-CCNC: 116 U/L (ref 10–35)
AMORPH CRY URNS QL MICRO: ABNORMAL
AMPHET UR QL SCN: NEGATIVE
ANION GAP SERPL CALC-SCNC: 12 MMOL/L (ref 2–12)
APAP SERPL-MCNC: <5 UG/ML (ref 10–30)
APPEARANCE UR: ABNORMAL
AST SERPL-CCNC: 72 U/L (ref 10–35)
BACTERIA URNS QL MICRO: NEGATIVE /HPF
BARBITURATES UR QL SCN: NEGATIVE
BASOPHILS # BLD: 0.09 K/UL (ref 0–0.1)
BASOPHILS NFR BLD: 0.8 % (ref 0–1)
BENZODIAZ UR QL: POSITIVE
BILIRUB SERPL-MCNC: 0.3 MG/DL (ref 0.2–1)
BILIRUB UR QL: NEGATIVE
BUN SERPL-MCNC: 11 MG/DL (ref 6–20)
BUN/CREAT SERPL: 17 (ref 12–20)
CALCIUM SERPL-MCNC: 9.2 MG/DL (ref 8.6–10)
CANNABINOIDS UR QL SCN: NEGATIVE
CHLORIDE SERPL-SCNC: 107 MMOL/L (ref 98–107)
CO2 SERPL-SCNC: 22 MMOL/L (ref 22–29)
COCAINE UR QL SCN: NEGATIVE
COLOR UR: ABNORMAL
CREAT SERPL-MCNC: 0.65 MG/DL (ref 0.5–0.9)
DIFFERENTIAL METHOD BLD: ABNORMAL
EKG ATRIAL RATE: 93 BPM
EKG DIAGNOSIS: NORMAL
EKG P AXIS: 33 DEGREES
EKG P-R INTERVAL: 152 MS
EKG Q-T INTERVAL: 348 MS
EKG QRS DURATION: 80 MS
EKG QTC CALCULATION (BAZETT): 432 MS
EKG R AXIS: 24 DEGREES
EKG T AXIS: 31 DEGREES
EKG VENTRICULAR RATE: 93 BPM
EOSINOPHIL # BLD: 0.27 K/UL (ref 0–0.4)
EOSINOPHIL NFR BLD: 2.3 % (ref 0–7)
EPITH CASTS URNS QL MICRO: ABNORMAL /LPF
ERYTHROCYTE [DISTWIDTH] IN BLOOD BY AUTOMATED COUNT: 14.2 % (ref 11.5–14.5)
ETHANOL SERPL-MCNC: <10 MG/DL (ref 0–10)
FLUAV RNA SPEC QL NAA+PROBE: NOT DETECTED
FLUBV RNA SPEC QL NAA+PROBE: NOT DETECTED
GLOBULIN SER CALC-MCNC: 2.7 G/DL (ref 2–4)
GLUCOSE SERPL-MCNC: 109 MG/DL (ref 65–100)
GLUCOSE UR STRIP.AUTO-MCNC: NEGATIVE MG/DL
HCG SERPL-ACNC: <1 MIU/ML
HCT VFR BLD AUTO: 48 % (ref 35–47)
HGB BLD-MCNC: 16.4 G/DL (ref 11.5–16)
HGB UR QL STRIP: NEGATIVE
IMM GRANULOCYTES # BLD AUTO: 0.03 K/UL (ref 0–0.04)
IMM GRANULOCYTES NFR BLD AUTO: 0.3 % (ref 0–0.5)
KETONES UR QL STRIP.AUTO: ABNORMAL MG/DL
LEUKOCYTE ESTERASE UR QL STRIP.AUTO: NEGATIVE
LYMPHOCYTES # BLD: 3.53 K/UL (ref 0.8–3.5)
LYMPHOCYTES NFR BLD: 30.5 % (ref 12–49)
Lab: ABNORMAL
MCH RBC QN AUTO: 31.2 PG (ref 26–34)
MCHC RBC AUTO-ENTMCNC: 34.2 G/DL (ref 30–36.5)
MCV RBC AUTO: 91.3 FL (ref 80–99)
METHADONE UR QL: NEGATIVE
MONOCYTES # BLD: 0.91 K/UL (ref 0–1)
MONOCYTES NFR BLD: 7.9 % (ref 5–13)
MUCOUS THREADS URNS QL MICRO: ABNORMAL /LPF
NEUTS SEG # BLD: 6.76 K/UL (ref 1.8–8)
NEUTS SEG NFR BLD: 58.2 % (ref 32–75)
NITRITE UR QL STRIP.AUTO: NEGATIVE
NRBC # BLD: 0 K/UL (ref 0–0.01)
NRBC BLD-RTO: 0 PER 100 WBC
OPIATES UR QL: NEGATIVE
PCP UR QL: NEGATIVE
PH UR STRIP: 6 (ref 5–8)
PLATELET # BLD AUTO: 261 K/UL (ref 150–400)
PMV BLD AUTO: 9.8 FL (ref 8.9–12.9)
POTASSIUM SERPL-SCNC: 4.3 MMOL/L (ref 3.5–5.1)
PROT SERPL-MCNC: 6.9 G/DL (ref 6.4–8.3)
PROT UR STRIP-MCNC: ABNORMAL MG/DL
RBC # BLD AUTO: 5.26 M/UL (ref 3.8–5.2)
RBC #/AREA URNS HPF: ABNORMAL /HPF
SALICYLATES SERPL-MCNC: <0.3 MG/DL (ref 3–10)
SARS-COV-2 RNA RESP QL NAA+PROBE: NOT DETECTED
SODIUM SERPL-SCNC: 141 MMOL/L (ref 136–145)
SOURCE: NORMAL
SP GR UR REFRACTOMETRY: >1.03 (ref 1–1.03)
UROBILINOGEN UR QL STRIP.AUTO: 0.2 EU/DL (ref 0.2–1)
WBC # BLD AUTO: 11.6 K/UL (ref 3.6–11)
WBC URNS QL MICRO: ABNORMAL /HPF (ref 0–4)

## 2025-03-01 PROCEDURE — 93005 ELECTROCARDIOGRAM TRACING: CPT | Performed by: STUDENT IN AN ORGANIZED HEALTH CARE EDUCATION/TRAINING PROGRAM

## 2025-03-01 PROCEDURE — 82077 ASSAY SPEC XCP UR&BREATH IA: CPT

## 2025-03-01 PROCEDURE — 87636 SARSCOV2 & INF A&B AMP PRB: CPT

## 2025-03-01 PROCEDURE — 80053 COMPREHEN METABOLIC PANEL: CPT

## 2025-03-01 PROCEDURE — 80179 DRUG ASSAY SALICYLATE: CPT

## 2025-03-01 PROCEDURE — 80307 DRUG TEST PRSMV CHEM ANLYZR: CPT

## 2025-03-01 PROCEDURE — 1240000000 HC EMOTIONAL WELLNESS R&B

## 2025-03-01 PROCEDURE — 80143 DRUG ASSAY ACETAMINOPHEN: CPT

## 2025-03-01 PROCEDURE — 71046 X-RAY EXAM CHEST 2 VIEWS: CPT

## 2025-03-01 PROCEDURE — 81001 URINALYSIS AUTO W/SCOPE: CPT

## 2025-03-01 PROCEDURE — 36415 COLL VENOUS BLD VENIPUNCTURE: CPT

## 2025-03-01 PROCEDURE — 84702 CHORIONIC GONADOTROPIN TEST: CPT

## 2025-03-01 PROCEDURE — 6370000000 HC RX 637 (ALT 250 FOR IP): Performed by: STUDENT IN AN ORGANIZED HEALTH CARE EDUCATION/TRAINING PROGRAM

## 2025-03-01 PROCEDURE — 85025 COMPLETE CBC W/AUTO DIFF WBC: CPT

## 2025-03-01 RX ORDER — HALOPERIDOL 5 MG/ML
5 INJECTION INTRAMUSCULAR EVERY 4 HOURS PRN
Status: DISCONTINUED | OUTPATIENT
Start: 2025-03-01 | End: 2025-03-02 | Stop reason: HOSPADM

## 2025-03-01 RX ORDER — PROPRANOLOL HCL 20 MG
20 TABLET ORAL 2 TIMES DAILY
Status: ON HOLD | COMMUNITY
Start: 2025-01-02 | End: 2025-03-01 | Stop reason: ALTCHOICE

## 2025-03-01 RX ORDER — QUETIAPINE FUMARATE 50 MG/1
50 TABLET, FILM COATED ORAL NIGHTLY PRN
Status: ON HOLD | COMMUNITY
Start: 2025-02-02 | End: 2025-03-01 | Stop reason: ALTCHOICE

## 2025-03-01 RX ORDER — HALOPERIDOL 5 MG/1
5 TABLET ORAL EVERY 4 HOURS PRN
Status: DISCONTINUED | OUTPATIENT
Start: 2025-03-01 | End: 2025-03-02 | Stop reason: HOSPADM

## 2025-03-01 RX ORDER — MAGNESIUM HYDROXIDE/ALUMINUM HYDROXICE/SIMETHICONE 120; 1200; 1200 MG/30ML; MG/30ML; MG/30ML
30 SUSPENSION ORAL EVERY 6 HOURS PRN
Status: DISCONTINUED | OUTPATIENT
Start: 2025-03-01 | End: 2025-03-02 | Stop reason: HOSPADM

## 2025-03-01 RX ORDER — QUETIAPINE FUMARATE 100 MG/1
200 TABLET, FILM COATED ORAL 2 TIMES DAILY
Status: DISCONTINUED | OUTPATIENT
Start: 2025-03-01 | End: 2025-03-01 | Stop reason: HOSPADM

## 2025-03-01 RX ORDER — HYDROXYZINE PAMOATE 50 MG/1
CAPSULE ORAL
Status: ON HOLD | COMMUNITY
Start: 2025-01-11 | End: 2025-03-01 | Stop reason: ALTCHOICE

## 2025-03-01 RX ORDER — ALBUTEROL SULFATE 90 UG/1
2 INHALANT RESPIRATORY (INHALATION) EVERY 6 HOURS PRN
Status: DISCONTINUED | OUTPATIENT
Start: 2025-03-01 | End: 2025-03-01 | Stop reason: CLARIF

## 2025-03-01 RX ORDER — HYDROXYZINE HYDROCHLORIDE 25 MG/1
50 TABLET, FILM COATED ORAL 3 TIMES DAILY PRN
Status: DISCONTINUED | OUTPATIENT
Start: 2025-03-01 | End: 2025-03-02 | Stop reason: HOSPADM

## 2025-03-01 RX ORDER — ALBUTEROL SULFATE 0.83 MG/ML
2.5 SOLUTION RESPIRATORY (INHALATION) EVERY 6 HOURS PRN
Status: DISCONTINUED | OUTPATIENT
Start: 2025-03-01 | End: 2025-03-01 | Stop reason: HOSPADM

## 2025-03-01 RX ORDER — DIVALPROEX SODIUM 500 MG/1
1000 TABLET, FILM COATED, EXTENDED RELEASE ORAL 2 TIMES DAILY
Status: ON HOLD | COMMUNITY
Start: 2025-01-02 | End: 2025-03-01 | Stop reason: ALTCHOICE

## 2025-03-01 RX ORDER — DIAZEPAM 5 MG/1
10 TABLET ORAL EVERY 12 HOURS PRN
Status: DISCONTINUED | OUTPATIENT
Start: 2025-03-01 | End: 2025-03-01 | Stop reason: HOSPADM

## 2025-03-01 RX ORDER — ARIPIPRAZOLE 5 MG/1
5 TABLET ORAL DAILY
Status: ON HOLD | COMMUNITY
Start: 2025-01-02 | End: 2025-03-01 | Stop reason: ALTCHOICE

## 2025-03-01 RX ORDER — POLYETHYLENE GLYCOL 3350 17 G/17G
17 POWDER, FOR SOLUTION ORAL DAILY PRN
Status: DISCONTINUED | OUTPATIENT
Start: 2025-03-01 | End: 2025-03-02 | Stop reason: HOSPADM

## 2025-03-01 RX ORDER — ACETAMINOPHEN 325 MG/1
650 TABLET ORAL EVERY 4 HOURS PRN
Status: DISCONTINUED | OUTPATIENT
Start: 2025-03-01 | End: 2025-03-02 | Stop reason: HOSPADM

## 2025-03-01 RX ORDER — DIPHENHYDRAMINE HYDROCHLORIDE 50 MG/ML
50 INJECTION INTRAMUSCULAR; INTRAVENOUS EVERY 4 HOURS PRN
Status: DISCONTINUED | OUTPATIENT
Start: 2025-03-01 | End: 2025-03-02 | Stop reason: HOSPADM

## 2025-03-01 RX ADMIN — QUETIAPINE FUMARATE 200 MG: 100 TABLET ORAL at 09:15

## 2025-03-01 ASSESSMENT — SLEEP AND FATIGUE QUESTIONNAIRES
DO YOU USE A SLEEP AID: NO
AVERAGE NUMBER OF SLEEP HOURS: 6
DO YOU HAVE DIFFICULTY SLEEPING: NO

## 2025-03-01 ASSESSMENT — LIFESTYLE VARIABLES
HOW OFTEN DO YOU HAVE A DRINK CONTAINING ALCOHOL: NEVER
HOW OFTEN DO YOU HAVE A DRINK CONTAINING ALCOHOL: NEVER
HOW MANY STANDARD DRINKS CONTAINING ALCOHOL DO YOU HAVE ON A TYPICAL DAY: PATIENT DOES NOT DRINK
HOW MANY STANDARD DRINKS CONTAINING ALCOHOL DO YOU HAVE ON A TYPICAL DAY: PATIENT DOES NOT DRINK

## 2025-03-01 ASSESSMENT — PAIN SCALES - GENERAL: PAINLEVEL_OUTOF10: 0

## 2025-03-01 NOTE — H&P
History and Physical    Date of Service:  3/1/2025  Primary Care Provider: Bam Campos MD  Source of information: The patient and Chart review    I discussed the risks and benefits of a telehealth visit and I obtained the patient's or legally authorized representative's informed verbal consent to conduct this assessment using telehealth tools.? All of the patient’s or legally authorized representative's questions regarding the telehealth interaction were addressed. I provided my name and disclosed to the patient or legally authorized representative my licensure, certification, or registration. ? The History and physical  used real time licensed and encrypted telehealth equipment which allowed a live video connection between my location and the patient's location.? Aspects of the evaluation that could not be adequately evaluated by virtual assessment were shared with both the patient and the Attending provider.?       Chief Complaint: No chief complaint on file.      History of Presenting Illness:   Ysabel Montilla is a 29 y.o. female who presents with No chief complaint on file.  .         Per Psychiatrist/ BSMART /ED Provider Initial documentation:    An assessment was conducted via telehealth at the [facility], in attendance was this writer and Ysabel Montilla. At the start of the assessment, it was determined that Ysabel was oriented to place and time but not to self or situation. Ysabel during the assessment was unable to provide her name and referred to herself as Sharan Mar. This writer was informed by medical staff that Ysabel was brought in by EMS reporting that she was suffering from “Cabin Fever” and required medical attention. Medical staff reported that Ysabel was unable to provide them with any further information or her name but was able to state her social security number. 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

## 2025-03-01 NOTE — ED NOTES
Patient came out of room holding gown closed stating, \"I want to be discharged.\" Notified MD who will be speaking with patient.
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
## 2025-03-01 NOTE — BSMART NOTE
BSMART assessment completed, and suicide risk level noted to be no risk. Primary Nurse Marcie  and Physician Dr. Layton notified. Concerns not observed.
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
## 2025-03-01 NOTE — ED NOTES
Patient verbalized that she will go to [facility] to get help and is being loaded on stretcher now.

## 2025-03-01 NOTE — ED NOTES
Pt resting in bed at this time. NAD. Comfort measures met. 2nd PIV attempt successful by Izzy RODRIGUEZ. Pt tolerated well.

## 2025-03-01 NOTE — ED NOTES
TRANSFER - OUT REPORT:    Verbal report given to [facility] on Ysabel Montilla  being transferred to [facility] for routine progression of patient care       Report consisted of patient's Situation, Background, Assessment and   Recommendations(SBAR).     Information from the following report(s) ED SBAR, Adult Overview, Intake/Output, MAR, Recent Results, and Neuro Assessment was reviewed with the receiving nurse.    [place] Fall Assessment:    Presents to emergency department  because of falls (Syncope, seizure, or loss of consciousness): No  Age > 70: No  Altered Mental Status, Intoxication with alcohol or substance confusion (Disorientation, impaired judgment, poor safety awaremess, or inability to follow instructions): No  Impaired Mobility: Ambulates or transfers with assistive devices or assistance; Unable to ambulate or transer.: No  Nursing Judgement: No          Lines:       Opportunity for questions and clarification was provided.      Patient transported with: Notified [facility] patient has no IV line and will be transporting with her personal belongings.

## 2025-03-01 NOTE — BSMART NOTE
[facility] was contacted and writer sent requested clinicals to  clinician Zaid.

## 2025-03-01 NOTE — ED NOTES
VBSR received from MEGHA RN. All questions answered. Patient laying in bed comfortably, breathing even/unlabored.

## 2025-03-01 NOTE — BH NOTE
Behavioral health bed placement note:    Accepted by Clarisa Arnett for Dr Burciaga to [facility]. Acute side and room 315/2. Pt has not been ordered to have one to one upon admission to U per Clarisa.   Report .    Katlyn Clemens  Access  /7

## 2025-03-01 NOTE — ED PROVIDER NOTES
ED SIGN OUT NOTE  Care assumed at [facility] 8:13 AM EST    Patient was signed out to me by Dr. Layton.     Patient signed out pending disposition      BP (!) 138/90   Pulse 97   Temp 97.8 °F (36.6 °C) (Oral)   Resp 20   Ht 1.702 m (5' 7\")   Wt 131.1 kg (289 lb)   SpO2 97%   BMI 45.26 kg/m²   Labs Reviewed   CBC WITH AUTO DIFFERENTIAL - Abnormal; Notable for the following components:       Result Value    WBC 11.6 (*)     RBC 5.26 (*)     Hemoglobin 16.4 (*)     Hematocrit 48.0 (*)     Lymphocytes Absolute 3.53 (*)     All other components within normal limits   COMPREHENSIVE METABOLIC PANEL - Abnormal; Notable for the following components:    Glucose 109 (*)      (*)     AST 72 (*)     All other components within normal limits   SALICYLATE LEVEL - Abnormal; Notable for the following components:    Salicylate Lvl <0.3 (*)     All other components within normal limits   ACETAMINOPHEN LEVEL - Abnormal; Notable for the following components:    Acetaminophen Level <5 (*)     All other components within normal limits   URINE DRUG SCREEN - Abnormal; Notable for the following components:    Benzodiazepines, Urine Positive (*)     All other components within normal limits   URINALYSIS WITH MICROSCOPIC - Abnormal; Notable for the following components:    Appearance CLOUDY (*)     Specific Gravity, UA >1.030 (*)     Protein, UA TRACE (*)     Ketones, Urine TRACE (*)     Mucus, UA 1+ (*)     Amorphous Crystal 4+ (*)     All other components within normal limits   COVID-19 & INFLUENZA COMBO   HCG, QUANTITATIVE, PREGNANCY   ETHANOL     XR CHEST (2 VW)   Final Result      No acute process on limited chest views.         Electronically signed by Enio Caro        ED Course as of 03/01/25 0813   Sat Mar 01, 2025   0042 EKG shows normal sinus rhythm, 93 bpm, no acute ischemic changes, no STEMI [SL]   0205 Sodium: 141 [SL]   0205 Potassium: 4.3 [SL]   0205 Chloride: 107 [SL]   0205 Creatinine: 0.65

## 2025-03-01 NOTE — ED NOTES
TRANSFER - OUT REPORT:    Verbal report given to JENNIFER Payan on Ysabel Montilla  being transferred to [facility] for routine progression of patient care       Report consisted of patient's Situation, Background, Assessment and   Recommendations(SBAR).     Information from the following report(s) ED Encounter Summary, ED SBAR, Adult Overview, Intake/Output, MAR, Recent Results, Med Rec Status, and Neuro Assessment was reviewed with the receiving nurse.    [place] Fall Assessment:    Presents to emergency department  because of falls (Syncope, seizure, or loss of consciousness): No  Age > 70: No  Altered Mental Status, Intoxication with alcohol or substance confusion (Disorientation, impaired judgment, poor safety awaremess, or inability to follow instructions): No  Impaired Mobility: Ambulates or transfers with assistive devices or assistance; Unable to ambulate or transer.: No  Nursing Judgement: No          Lines:       Opportunity for questions and clarification was provided.      Patient transported with: transportation company. No IV in place.     Notified JENNIFER Payan that we do not have Zoloft at our facility and patient noted she no longer takes zoloft. She verbalized understanding.     Notified JENNIFER Payan that patient is voluntary admission and no longer TDO. She verbalized understanding.
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
## 2025-03-01 NOTE — ED NOTES
Fidelia from Crisis called to assess patient for TDO. Telehealth and phone used for eval. Crisis given SBAR on pt by this RN. Crisis currently in process of obtaining a TDO. Will continue to monitor pt.

## 2025-03-01 NOTE — ED TRIAGE NOTES
Pt into ED via EMS from home with cc \"cabin fever.\" Pt reports \"fever comes and goes, faint weak fever but truly fatal.\" Denies meds prior to arrival. Pt reports 6 mo pregnant. Reports bleeding 2 days ago but has since stopped. NAD. VSS. Pt reports name is \"Fetty Wap\" and Ysabel Montilla is her \"cover name.\"

## 2025-03-01 NOTE — ED NOTES
Pt reports feeling \"hot from fever.\" Pt given gown. VSS. Afebrile. Comfort measures met. Will continue to monitor.

## 2025-03-01 NOTE — PLAN OF CARE

## 2025-03-02 VITALS
WEIGHT: 288.2 LBS | DIASTOLIC BLOOD PRESSURE: 54 MMHG | SYSTOLIC BLOOD PRESSURE: 104 MMHG | TEMPERATURE: 97.7 F | HEART RATE: 65 BPM | HEIGHT: 67 IN | BODY MASS INDEX: 45.24 KG/M2 | RESPIRATION RATE: 18 BRPM | OXYGEN SATURATION: 97 %

## 2025-03-02 PROCEDURE — 99223 1ST HOSP IP/OBS HIGH 75: CPT | Performed by: PSYCHIATRY & NEUROLOGY

## 2025-03-02 NOTE — PLAN OF CARE
Problem: Discharge Planning  Goal: Discharge to home or other facility with appropriate resources  Outcome: Not Progressing     Problem: Psychosis  Goal: Will report no hallucinations or delusions  Description: INTERVENTIONS:  1. Administer medication as  ordered  2. Assist with reality testing to support increasing orientation  3. Assess if patient's hallucinations or delusions are encouraging self harm or harm to others and intervene as appropriate  Outcome: Not Progressing     Problem: Confusion  Goal: Confusion, delirium, dementia, or psychosis is improved or at baseline  Description: INTERVENTIONS:  1. Assess for possible contributors to thought disturbance, including medications, impaired vision or hearing, underlying metabolic abnormalities, dehydration, psychiatric diagnoses, and notify attending LIP  2. [place] high risk fall precautions, as indicated  3. Provide frequent short contacts to provide reality reorientation, refocusing and direction  4. Decrease environmental stimuli, including noise as appropriate  5. Monitor and intervene to maintain adequate nutrition, hydration, elimination, sleep and activity  6. If unable to ensure safety without constant attention obtain sitter and review sitter guidelines with assigned personnel  7. Initiate Psychosocial CNS and Spiritual Care consult, as indicated  3/1/2025 6878 by Agueda Ramos RN  Outcome: Not Progressing     Problem: Sleep Disturbance  Goal: Will exhibit normal sleeping pattern  Description: INTERVENTIONS:  1. Administer medication as ordered  2. Decrease environmental stimuli, including noise, as appropriate  3. Discourage social isolation and naps during the day  Outcome: Progressing

## 2025-03-02 NOTE — BH NOTE
Behavioral health bed placement note:    Accepted by Clarisa Arnett for Dr Burciaga to [facility]. Acute side and room 315/2. Pt has not been ordered to have one to one upon admission to U per Clarisa.   Report .    Katlyn Clemens  Access  /0  
Met with patient lying down resting in bed, Blunted affect. Paranoid. Denies depression and anxiety. Denies SI, HI and AVH. CSSRS No Risk. Patient stated \" I am not here for psychiatric reasons , I am here for witness protection for the [facility] government \".  Delusional. Stated that she is 6 months pregnant, although her pregnancy test was negative..No scheduled medications or prns given. Sleeping and eating well. Remains on Q15 minute rounds for safety.   
Night shift assessment completed. 19:30- 07:30   Pt alert and oriented to person, time, and place. Pt encountered in the patient room resting in bed quietly with no signs of psychiatric or medical distress. Pt calm and cooperative with assessment questions. Displays flat affect with suspicious mood. Noted with fair eye contact. Pt demonstrates preoccupied thought content. Pt distracted easily.  Pt denied having anxiety with generalized symptoms, no depression, no SI/HI. No A/V hallucinations. No pain. C-SS RS assessment score no risk. Speech unremarkable and ambulates with steady gait. Pt isolative to self and minimally interacting with peers. Vital signs unremarkable.Temperature 36.9,  pulse 86, Respiration 18, SpO2 99 and /72.  Nursing intervention.  No medication scheduled and administered during this shift.  Hourly rounds and Q 15 minutes checks observed during this shift for care and safety.  Emotional support and encouragement provided to the patient.  Evaluation  No unsafe behavior observed this shift.  Continue with care as ordered.      Pt slept for 7.15 HRS      
Patient discharged AMA . Patient received all personal belongings, and valuables. Patient was escorted to ED exit doors and was released. No acute distress voiced or observed.   
Patient requested to leave AMA.  Contacted MD and [facility]. Spoke to Marii at [facility]. Patient was assessed yesterday for a TDO and  refused to sign the order. Patient came to [facility] on a voluntary basis. [facility] would not reassess patient. Contacted [facility] and was told that they would not assess patient due to the fact she was assessed yesterday by [facility].  Notified MD. Order given for AMA discharge.   
Preoccupied  Sexual Misconduct History: Current - no  Preception: [place] to situation  Attention:Normal: Yes  Thought Processes: Perseveration  Thought Content:Normal: No  Thought Content: Delusions, Paranoia  Depression Symptoms: No problems reported or observed.  Anxiety Symptoms: No problems reported or observed.  Faye Symptoms: No problems reported or observed.  Hallucinations: None  Delusions: Yes  Delusions: Paranoid  Memory:Normal: No  Memory: Poor recent  Insight and Judgment: No  Insight and Judgment: Poor judgment    Pt admitted with followings belongings:  Dental Appliances: None  Vision - Corrective Lenses: None  Hearing Aid: None  Jewelry: Bracelet, Necklace, Ring  Body Piercings Removed: Yes  Clothing: Pants, Shirt, Socks, Undergarments, Footwear  Other Valuables: Money, Wallet, Purse, Credit/Debit Card, Cigarettes, Lighter/Matches       ISABELLE MEIER RN         Skin assessment performed by this nurse and JENNIFER Sprague.  Patient's skin is unremarkable.

## 2025-03-02 NOTE — PLAN OF CARE
Problem: Discharge Planning  Goal: Discharge to home or other facility with appropriate resources  3/1/2025 2258 by Agueda Ramos, RN  Outcome: Not Progressing     Problem: Psychosis  Goal: Will report no hallucinations or delusions  Description: INTERVENTIONS:  1. Administer medication as  ordered  2. Assist with reality testing to support increasing orientation  3. Assess if patient's hallucinations or delusions are encouraging self harm or harm to others and intervene as appropriate  3/2/2025 1156 by Darcie Carrero RN  Outcome: Not Progressing  3/1/2025 2258 by Agueda Ramos RN  Outcome: Not Progressing     Problem: Confusion  Goal: Confusion, delirium, dementia, or psychosis is improved or at baseline  Description: INTERVENTIONS:  1. Assess for possible contributors to thought disturbance, including medications, impaired vision or hearing, underlying metabolic abnormalities, dehydration, psychiatric diagnoses, and notify attending LIP  2. [place] high risk fall precautions, as indicated  3. Provide frequent short contacts to provide reality reorientation, refocusing and direction  4. Decrease environmental stimuli, including noise as appropriate  5. Monitor and intervene to maintain adequate nutrition, hydration, elimination, sleep and activity  6. If unable to ensure safety without constant attention obtain sitter and review sitter guidelines with assigned personnel  7. Initiate Psychosocial CNS and Spiritual Care consult, as indicated  3/1/2025 2258 by Agueda Ramos, RN  Outcome: Not Progressing

## 2025-03-02 NOTE — PROGRESS NOTES
to Admission Medications   Patient stated not taking any medications       Thank you,  MARCELO DISLA, [facility]

## 2025-03-03 LAB
EKG ATRIAL RATE: 93 BPM
EKG DIAGNOSIS: NORMAL
EKG P AXIS: 33 DEGREES
EKG P-R INTERVAL: 152 MS
EKG Q-T INTERVAL: 348 MS
EKG QRS DURATION: 80 MS
EKG QTC CALCULATION (BAZETT): 432 MS
EKG R AXIS: 24 DEGREES
EKG T AXIS: 31 DEGREES
EKG VENTRICULAR RATE: 93 BPM

## 2025-03-03 PROCEDURE — 93010 ELECTROCARDIOGRAM REPORT: CPT | Performed by: SPECIALIST

## 2025-03-03 NOTE — DISCHARGE SUMMARY
does not voice any thoughts of self harm, which has precluded a TDO petition. Will release to her own care, update [facility] on her status.  - Discharge   - Outpatient followup as scheduled         PROVISIONAL & DISCHARGE DIAGNOSES:      Active Hospital Problems    *Schizophrenia (HCC)        DISCHARGE DIAGNOSIS:   Axis I:  SEE ABOVE  Axis II: SEE ABOVE  Axis III: SEE ABOVE  Axis IV:  lack of structure  Axis V:  20 on admission, 20 on discharge       I have reviewed old psychiatric and medical records available in the EHR. I Will order additional psychiatric records from other institutions to further elucidate the nature of patient's psychopathology and review once available.    I will gather additional collateral information from friends, family and o/p treatment team to further elucidate the nature of patient's psychopathology and baselline level of psychiatric functioning.      ESTIMATED LENGTH OF STAY:     1 day per patient's request.         STRENGTHS:    Exercising self-direction/Resourceful and Realizes one's potential                 DISPOSITION:      Home. Patient to f/u with psychiatric/psychotherapy appointments. Pt to f/u with internist as directed.               FOLLOW-UP CARE:      The patient refused all follow up care. [facility] aware of her discharge.           PROGNOSIS:    Poor.            DISCHARGE MEDICATIONS: (no changes made).     There are no discharge medications for this patient.                                   SIGNED:    Salvador Taveras MD  3/3/2025
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
## 2025-06-07 ENCOUNTER — HOSPITAL ENCOUNTER (EMERGENCY)
Facility: HOSPITAL | Age: 30
Discharge: ANOTHER HOSPITAL | End: 2025-06-08
Attending: EMERGENCY MEDICINE
Payer: COMMERCIAL

## 2025-06-07 DIAGNOSIS — E87.20 METABOLIC ACIDOSIS: ICD-10-CM

## 2025-06-07 DIAGNOSIS — F30.9 MANIA (HCC): Primary | ICD-10-CM

## 2025-06-07 DIAGNOSIS — F29 PSYCHOSIS, UNSPECIFIED PSYCHOSIS TYPE (HCC): ICD-10-CM

## 2025-06-07 LAB
ALBUMIN SERPL-MCNC: 4.4 G/DL (ref 3.5–5.2)
ALBUMIN/GLOB SERPL: 1.6 (ref 1.1–2.2)
ALP SERPL-CCNC: 71 U/L (ref 35–104)
ALT SERPL-CCNC: 81 U/L (ref 10–35)
AMPHET UR QL SCN: NEGATIVE
ANION GAP SERPL CALC-SCNC: 19 MMOL/L (ref 2–12)
APAP SERPL-MCNC: <5 UG/ML (ref 10–30)
APPEARANCE UR: CLEAR
AST SERPL-CCNC: 63 U/L (ref 10–35)
BACTERIA URNS QL MICRO: ABNORMAL /HPF
BARBITURATES UR QL SCN: NEGATIVE
BASOPHILS # BLD: 0.09 K/UL (ref 0–0.1)
BASOPHILS NFR BLD: 0.5 % (ref 0–1)
BENZODIAZ UR QL: POSITIVE
BILIRUB SERPL-MCNC: 0.7 MG/DL (ref 0.2–1)
BILIRUB UR QL CFM: NEGATIVE
BUN SERPL-MCNC: 11 MG/DL (ref 6–20)
BUN/CREAT SERPL: 15 (ref 12–20)
CALCIUM SERPL-MCNC: 9.1 MG/DL (ref 8.6–10)
CANNABINOIDS UR QL SCN: NEGATIVE
CHLORIDE SERPL-SCNC: 97 MMOL/L (ref 98–107)
CK SERPL-CCNC: 102 U/L (ref 20–180)
CO2 SERPL-SCNC: 19 MMOL/L (ref 22–29)
COCAINE UR QL SCN: NEGATIVE
COLOR UR: ABNORMAL
CREAT SERPL-MCNC: 0.73 MG/DL (ref 0.5–0.9)
DIFFERENTIAL METHOD BLD: ABNORMAL
EOSINOPHIL # BLD: 0.13 K/UL (ref 0–0.4)
EOSINOPHIL NFR BLD: 0.7 % (ref 0–7)
EPITH CASTS URNS QL MICRO: ABNORMAL /LPF
ERYTHROCYTE [DISTWIDTH] IN BLOOD BY AUTOMATED COUNT: 12.9 % (ref 11.5–14.5)
ETHANOL SERPL-MCNC: <10 MG/DL (ref 0–10)
GLOBULIN SER CALC-MCNC: 2.8 G/DL (ref 2–4)
GLUCOSE SERPL-MCNC: 200 MG/DL (ref 65–100)
GLUCOSE UR STRIP.AUTO-MCNC: 500 MG/DL
HCG SERPL-ACNC: <1 MIU/ML
HCT VFR BLD AUTO: 46.9 % (ref 35–47)
HGB BLD-MCNC: 16.4 G/DL (ref 11.5–16)
HGB UR QL STRIP: NEGATIVE
IMM GRANULOCYTES # BLD AUTO: 0.09 K/UL (ref 0–0.04)
IMM GRANULOCYTES NFR BLD AUTO: 0.5 % (ref 0–0.5)
KETONES UR QL STRIP.AUTO: 15 MG/DL
LEUKOCYTE ESTERASE UR QL STRIP.AUTO: NEGATIVE
LYMPHOCYTES # BLD: 3.06 K/UL (ref 0.8–3.5)
LYMPHOCYTES NFR BLD: 17.2 % (ref 12–49)
Lab: ABNORMAL
MCH RBC QN AUTO: 31.7 PG (ref 26–34)
MCHC RBC AUTO-ENTMCNC: 35 G/DL (ref 30–36.5)
MCV RBC AUTO: 90.7 FL (ref 80–99)
METHADONE UR QL: NEGATIVE
MONOCYTES # BLD: 1.95 K/UL (ref 0–1)
MONOCYTES NFR BLD: 10.9 % (ref 5–13)
MUCOUS THREADS URNS QL MICRO: ABNORMAL /LPF
NEUTS SEG # BLD: 12.51 K/UL (ref 1.8–8)
NEUTS SEG NFR BLD: 70.2 % (ref 32–75)
NITRITE UR QL STRIP.AUTO: NEGATIVE
NRBC # BLD: 0 K/UL (ref 0–0.01)
NRBC BLD-RTO: 0 PER 100 WBC
OPIATES UR QL: NEGATIVE
PCP UR QL: NEGATIVE
PH UR STRIP: 5.5 (ref 5–8)
PLATELET # BLD AUTO: 359 K/UL (ref 150–400)
PMV BLD AUTO: 9.4 FL (ref 8.9–12.9)
POTASSIUM SERPL-SCNC: 3.5 MMOL/L (ref 3.5–5.1)
PROT SERPL-MCNC: 7.2 G/DL (ref 6.4–8.3)
PROT UR STRIP-MCNC: 30 MG/DL
RBC # BLD AUTO: 5.17 M/UL (ref 3.8–5.2)
RBC #/AREA URNS HPF: ABNORMAL /HPF
SALICYLATES SERPL-MCNC: <0.3 MG/DL (ref 3–10)
SODIUM SERPL-SCNC: 135 MMOL/L (ref 136–145)
SP GR UR REFRACTOMETRY: 1.02 (ref 1–1.03)
TSH SERPL DL<=0.05 MIU/L-ACNC: 1.99 UIU/ML (ref 0.27–4.2)
UROBILINOGEN UR QL STRIP.AUTO: 1 EU/DL (ref 0.2–1)
WBC # BLD AUTO: 17.8 K/UL (ref 3.6–11)
WBC URNS QL MICRO: ABNORMAL /HPF (ref 0–4)

## 2025-06-07 PROCEDURE — 93005 ELECTROCARDIOGRAM TRACING: CPT | Performed by: EMERGENCY MEDICINE

## 2025-06-07 PROCEDURE — 82077 ASSAY SPEC XCP UR&BREATH IA: CPT

## 2025-06-07 PROCEDURE — 81001 URINALYSIS AUTO W/SCOPE: CPT

## 2025-06-07 PROCEDURE — 80179 DRUG ASSAY SALICYLATE: CPT

## 2025-06-07 PROCEDURE — 80053 COMPREHEN METABOLIC PANEL: CPT

## 2025-06-07 PROCEDURE — 96360 HYDRATION IV INFUSION INIT: CPT

## 2025-06-07 PROCEDURE — 2580000003 HC RX 258: Performed by: EMERGENCY MEDICINE

## 2025-06-07 PROCEDURE — 80307 DRUG TEST PRSMV CHEM ANLYZR: CPT

## 2025-06-07 PROCEDURE — 82550 ASSAY OF CK (CPK): CPT

## 2025-06-07 PROCEDURE — 84443 ASSAY THYROID STIM HORMONE: CPT

## 2025-06-07 PROCEDURE — 84702 CHORIONIC GONADOTROPIN TEST: CPT

## 2025-06-07 PROCEDURE — 85025 COMPLETE CBC W/AUTO DIFF WBC: CPT

## 2025-06-07 PROCEDURE — 36415 COLL VENOUS BLD VENIPUNCTURE: CPT

## 2025-06-07 PROCEDURE — 90791 PSYCH DIAGNOSTIC EVALUATION: CPT | Performed by: SOCIAL WORKER

## 2025-06-07 PROCEDURE — 99285 EMERGENCY DEPT VISIT HI MDM: CPT

## 2025-06-07 PROCEDURE — 96361 HYDRATE IV INFUSION ADD-ON: CPT

## 2025-06-07 PROCEDURE — 80143 DRUG ASSAY ACETAMINOPHEN: CPT

## 2025-06-07 RX ORDER — HALOPERIDOL 5 MG/ML
5 INJECTION INTRAMUSCULAR ONCE
Status: DISCONTINUED | OUTPATIENT
Start: 2025-06-07 | End: 2025-06-08 | Stop reason: HOSPADM

## 2025-06-07 RX ORDER — 0.9 % SODIUM CHLORIDE 0.9 %
1000 INTRAVENOUS SOLUTION INTRAVENOUS ONCE
Status: COMPLETED | OUTPATIENT
Start: 2025-06-07 | End: 2025-06-08

## 2025-06-07 RX ADMIN — SODIUM CHLORIDE 1000 ML: 0.9 INJECTION, SOLUTION INTRAVENOUS at 22:32

## 2025-06-08 ENCOUNTER — HOSPITAL ENCOUNTER (INPATIENT)
Facility: HOSPITAL | Age: 30
LOS: 16 days | Discharge: HOME | DRG: 753 | End: 2025-06-24
Attending: PSYCHIATRY & NEUROLOGY | Admitting: PSYCHIATRY & NEUROLOGY
Payer: COMMERCIAL

## 2025-06-08 VITALS
BODY MASS INDEX: 46.29 KG/M2 | OXYGEN SATURATION: 98 % | HEART RATE: 118 BPM | HEIGHT: 66 IN | DIASTOLIC BLOOD PRESSURE: 82 MMHG | SYSTOLIC BLOOD PRESSURE: 128 MMHG | WEIGHT: 288.03 LBS | TEMPERATURE: 98.2 F | RESPIRATION RATE: 18 BRPM

## 2025-06-08 VITALS
BODY MASS INDEX: 46.28 KG/M2 | RESPIRATION RATE: 23 BRPM | WEIGHT: 288 LBS | HEIGHT: 66 IN | DIASTOLIC BLOOD PRESSURE: 70 MMHG | SYSTOLIC BLOOD PRESSURE: 127 MMHG | OXYGEN SATURATION: 95 % | HEART RATE: 88 BPM | TEMPERATURE: 98.3 F

## 2025-06-08 DIAGNOSIS — E87.20 LACTIC ACIDOSIS: ICD-10-CM

## 2025-06-08 DIAGNOSIS — F23 ACUTE PSYCHOSIS (HCC): Primary | ICD-10-CM

## 2025-06-08 DIAGNOSIS — E86.0 DEHYDRATION: ICD-10-CM

## 2025-06-08 DIAGNOSIS — F25.0 SCHIZOAFFECTIVE DISORDER, BIPOLAR TYPE (HCC): Primary | ICD-10-CM

## 2025-06-08 LAB
ALBUMIN SERPL-MCNC: 3 G/DL (ref 3.5–5)
ALBUMIN/GLOB SERPL: 0.9 (ref 1.1–2.2)
ALP SERPL-CCNC: 63 U/L (ref 45–117)
ALT SERPL-CCNC: 72 U/L (ref 12–78)
ANION GAP BLD CALC-SCNC: 11 (ref 10–20)
ANION GAP SERPL CALC-SCNC: 9 MMOL/L (ref 2–12)
AST SERPL-CCNC: 49 U/L (ref 15–37)
BASE DEFICIT BLD-SCNC: 1.9 MMOL/L
BASOPHILS # BLD: 0.08 K/UL (ref 0–0.1)
BASOPHILS NFR BLD: 0.6 % (ref 0–1)
BILIRUB SERPL-MCNC: 0.6 MG/DL (ref 0.2–1)
BUN SERPL-MCNC: 9 MG/DL (ref 6–20)
BUN/CREAT SERPL: 10 (ref 12–20)
CA-I BLD-MCNC: 1.13 MMOL/L (ref 1.15–1.33)
CALCIUM SERPL-MCNC: 8.3 MG/DL (ref 8.5–10.1)
CHLORIDE BLD-SCNC: 105 MMOL/L (ref 100–111)
CHLORIDE SERPL-SCNC: 107 MMOL/L (ref 97–108)
CO2 BLD-SCNC: 20 MMOL/L (ref 22–29)
CO2 SERPL-SCNC: 21 MMOL/L (ref 21–32)
CREAT SERPL-MCNC: 0.91 MG/DL (ref 0.55–1.02)
CREAT UR-MCNC: 0.7 MG/DL (ref 0.6–1.3)
DIFFERENTIAL METHOD BLD: ABNORMAL
EKG ATRIAL RATE: 156 BPM
EKG DIAGNOSIS: NORMAL
EKG P AXIS: 52 DEGREES
EKG P-R INTERVAL: 126 MS
EKG Q-T INTERVAL: 266 MS
EKG QRS DURATION: 74 MS
EKG QTC CALCULATION (BAZETT): 426 MS
EKG R AXIS: 22 DEGREES
EKG T AXIS: 8 DEGREES
EKG VENTRICULAR RATE: 154 BPM
EOSINOPHIL # BLD: 0.13 K/UL (ref 0–0.4)
EOSINOPHIL NFR BLD: 0.9 % (ref 0–7)
ERYTHROCYTE [DISTWIDTH] IN BLOOD BY AUTOMATED COUNT: 12.8 % (ref 11.5–14.5)
EST. AVERAGE GLUCOSE BLD GHB EST-MCNC: 148 MG/DL
GLOBULIN SER CALC-MCNC: 3.3 G/DL (ref 2–4)
GLUCOSE BLD STRIP.AUTO-MCNC: 127 MG/DL (ref 74–99)
GLUCOSE SERPL-MCNC: 237 MG/DL (ref 65–100)
HBA1C MFR BLD: 6.8 % (ref 4–5.6)
HCO3 BLDA-SCNC: 21 MMOL/L
HCT VFR BLD AUTO: 42.8 % (ref 35–47)
HGB BLD-MCNC: 13.8 G/DL (ref 11.5–16)
IMM GRANULOCYTES # BLD AUTO: 0.07 K/UL (ref 0–0.04)
IMM GRANULOCYTES NFR BLD AUTO: 0.5 % (ref 0–0.5)
LACTATE BLD-SCNC: 2.7 MMOL/L (ref 0.4–2)
LACTATE BLD-SCNC: 2.96 MMOL/L (ref 0.4–2)
LACTATE SERPL-SCNC: 1.8 MMOL/L (ref 0.4–2)
LACTATE SERPL-SCNC: 3.7 MMOL/L (ref 0.4–2)
LYMPHOCYTES # BLD: 2.4 K/UL (ref 0.8–3.5)
LYMPHOCYTES NFR BLD: 17.3 % (ref 12–49)
MCH RBC QN AUTO: 31.2 PG (ref 26–34)
MCHC RBC AUTO-ENTMCNC: 32.2 G/DL (ref 30–36.5)
MCV RBC AUTO: 96.6 FL (ref 80–99)
MONOCYTES # BLD: 2.03 K/UL (ref 0–1)
MONOCYTES NFR BLD: 14.6 % (ref 5–13)
NEUTS SEG # BLD: 9.19 K/UL (ref 1.8–8)
NEUTS SEG NFR BLD: 66.1 % (ref 32–75)
NRBC # BLD: 0 K/UL (ref 0–0.01)
NRBC BLD-RTO: 0 PER 100 WBC
PCO2 BLDV: 31.5 MMHG (ref 41–51)
PH BLDV: 7.44 (ref 7.32–7.42)
PLATELET # BLD AUTO: 269 K/UL (ref 150–400)
PMV BLD AUTO: 9.6 FL (ref 8.9–12.9)
PO2 BLDV: 60 MMHG (ref 25–40)
POTASSIUM BLD-SCNC: 3.5 MMOL/L (ref 3.5–5.5)
POTASSIUM SERPL-SCNC: 3.3 MMOL/L (ref 3.5–5.1)
PROT SERPL-MCNC: 6.3 G/DL (ref 6.4–8.2)
RBC # BLD AUTO: 4.43 M/UL (ref 3.8–5.2)
RBC MORPH BLD: ABNORMAL
SAO2 % BLD: 92 % (ref 94–98)
SERVICE CMNT-IMP: ABNORMAL
SODIUM BLD-SCNC: 136 MMOL/L (ref 136–145)
SODIUM SERPL-SCNC: 137 MMOL/L (ref 136–145)
SPECIMEN SITE: ABNORMAL
WBC # BLD AUTO: 13.9 K/UL (ref 3.6–11)

## 2025-06-08 PROCEDURE — 96361 HYDRATE IV INFUSION ADD-ON: CPT

## 2025-06-08 PROCEDURE — 1240000000 HC EMOTIONAL WELLNESS R&B

## 2025-06-08 PROCEDURE — 83605 ASSAY OF LACTIC ACID: CPT

## 2025-06-08 PROCEDURE — 82330 ASSAY OF CALCIUM: CPT

## 2025-06-08 PROCEDURE — 82803 BLOOD GASES ANY COMBINATION: CPT

## 2025-06-08 PROCEDURE — 2580000003 HC RX 258: Performed by: EMERGENCY MEDICINE

## 2025-06-08 PROCEDURE — 85025 COMPLETE CBC W/AUTO DIFF WBC: CPT

## 2025-06-08 PROCEDURE — 96360 HYDRATION IV INFUSION INIT: CPT

## 2025-06-08 PROCEDURE — 84295 ASSAY OF SERUM SODIUM: CPT

## 2025-06-08 PROCEDURE — 99284 EMERGENCY DEPT VISIT MOD MDM: CPT

## 2025-06-08 PROCEDURE — 83036 HEMOGLOBIN GLYCOSYLATED A1C: CPT

## 2025-06-08 PROCEDURE — 93010 ELECTROCARDIOGRAM REPORT: CPT | Performed by: SPECIALIST

## 2025-06-08 PROCEDURE — 84132 ASSAY OF SERUM POTASSIUM: CPT

## 2025-06-08 PROCEDURE — 80053 COMPREHEN METABOLIC PANEL: CPT

## 2025-06-08 PROCEDURE — 82947 ASSAY GLUCOSE BLOOD QUANT: CPT

## 2025-06-08 RX ORDER — POLYETHYLENE GLYCOL 3350 17 G/17G
17 POWDER, FOR SOLUTION ORAL DAILY PRN
Status: DISCONTINUED | OUTPATIENT
Start: 2025-06-08 | End: 2025-06-24 | Stop reason: HOSPADM

## 2025-06-08 RX ORDER — HALOPERIDOL 5 MG/ML
5 INJECTION INTRAMUSCULAR EVERY 4 HOURS PRN
Status: DISCONTINUED | OUTPATIENT
Start: 2025-06-08 | End: 2025-06-24 | Stop reason: HOSPADM

## 2025-06-08 RX ORDER — ACETAMINOPHEN 325 MG/1
650 TABLET ORAL EVERY 4 HOURS PRN
Status: DISCONTINUED | OUTPATIENT
Start: 2025-06-08 | End: 2025-06-17

## 2025-06-08 RX ORDER — POTASSIUM CHLORIDE 750 MG/1
40 TABLET, EXTENDED RELEASE ORAL ONCE
Status: DISCONTINUED | OUTPATIENT
Start: 2025-06-08 | End: 2025-06-08 | Stop reason: HOSPADM

## 2025-06-08 RX ORDER — DIPHENHYDRAMINE HYDROCHLORIDE 50 MG/ML
50 INJECTION, SOLUTION INTRAMUSCULAR; INTRAVENOUS EVERY 4 HOURS PRN
Status: DISCONTINUED | OUTPATIENT
Start: 2025-06-08 | End: 2025-06-24 | Stop reason: HOSPADM

## 2025-06-08 RX ORDER — HALOPERIDOL 5 MG/1
5 TABLET ORAL EVERY 4 HOURS PRN
Status: DISCONTINUED | OUTPATIENT
Start: 2025-06-08 | End: 2025-06-24 | Stop reason: HOSPADM

## 2025-06-08 RX ORDER — POLYETHYLENE GLYCOL 3350 17 G
2 POWDER IN PACKET (EA) ORAL
Status: DISCONTINUED | OUTPATIENT
Start: 2025-06-08 | End: 2025-06-24 | Stop reason: HOSPADM

## 2025-06-08 RX ORDER — MAGNESIUM HYDROXIDE/ALUMINUM HYDROXICE/SIMETHICONE 120; 1200; 1200 MG/30ML; MG/30ML; MG/30ML
30 SUSPENSION ORAL EVERY 6 HOURS PRN
Status: DISCONTINUED | OUTPATIENT
Start: 2025-06-08 | End: 2025-06-24 | Stop reason: HOSPADM

## 2025-06-08 RX ORDER — 0.9 % SODIUM CHLORIDE 0.9 %
1000 INTRAVENOUS SOLUTION INTRAVENOUS ONCE
Status: COMPLETED | OUTPATIENT
Start: 2025-06-08 | End: 2025-06-08

## 2025-06-08 RX ORDER — HYDROXYZINE HYDROCHLORIDE 25 MG/1
50 TABLET, FILM COATED ORAL 3 TIMES DAILY PRN
Status: DISCONTINUED | OUTPATIENT
Start: 2025-06-08 | End: 2025-06-24 | Stop reason: HOSPADM

## 2025-06-08 RX ADMIN — SODIUM CHLORIDE 1000 ML: 0.9 INJECTION, SOLUTION INTRAVENOUS at 00:50

## 2025-06-08 RX ADMIN — SODIUM CHLORIDE 1000 ML: 0.9 INJECTION, SOLUTION INTRAVENOUS at 05:56

## 2025-06-08 ASSESSMENT — SLEEP AND FATIGUE QUESTIONNAIRES
AVERAGE NUMBER OF SLEEP HOURS: 4
DO YOU HAVE DIFFICULTY SLEEPING: YES
DO YOU USE A SLEEP AID: YES

## 2025-06-08 ASSESSMENT — PAIN - FUNCTIONAL ASSESSMENT: PAIN_FUNCTIONAL_ASSESSMENT: NONE - DENIES PAIN

## 2025-06-08 NOTE — ED NOTES
Noted pt doing something to the iv tubing, entered room to the tubing kinked and she was able to single handed unscrew the tubing almost disconnecting it, states \"I'm a doctor\"
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
## 2025-06-08 NOTE — ED NOTES
Pt ambulated over to transport stretcher, intermittently speaking gibberish words but otherwise speaks clearly to transporters and in nad, pt then reminded this nurse that her name is Shashank dwyer

## 2025-06-08 NOTE — ED NOTES
Bedside and Verbal shift change report given to JENNIFER Trimble (oncoming nurse) by JENNIFER Alfaro (offgoing nurse). Report included the following information Nurse Handoff Report, ED Encounter Summary, ED SBAR, MAR, Recent Results, and Neuro Assessment.

## 2025-06-08 NOTE — ED NOTES
This RN tried to perform head to toe assessment, patient did not wish to answer any additional questions and has no complaints at this time. Patient denies pain, CP, SOB, N/V/D. Patient did allow this RN to obtain labs.

## 2025-06-08 NOTE — ED NOTES
Patient has been accepted to [facility]   By Dr. Syed  Rm 311, bed B  Number for nurse to call report [phone]    [facility] was not aware that there was not an officer with the patient. They will look into who should be with the patient and follow back up.
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
## 2025-06-08 NOTE — ED NOTES
Call received from transfer center. Patient cleared to go to [facility] ED.
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
## 2025-06-08 NOTE — ED NOTES
TRANSFER - OUT REPORT:    Verbal report given to Casandra Urias RN  on Ysabel Montilla  being transferred to [facility] ER for routine progression of patient care       Report consisted of patient's Situation, Background, Assessment and   Recommendations(SBAR).     Information from the following report(s) Nurse Handoff Report, ED Encounter Summary, ED SBAR, Adult Overview, Intake/Output, MAR, Recent Results, Med Rec Status, and Neuro Assessment was reviewed with the receiving nurse.    [place] Fall Assessment:    Presents to emergency department  because of falls (Syncope, seizure, or loss of consciousness): No  Age > 70: No  Altered Mental Status, Intoxication with alcohol or substance confusion (Disorientation, impaired judgment, poor safety awaremess, or inability to follow instructions): Yes  Impaired Mobility: Ambulates or transfers with assistive devices or assistance; Unable to ambulate or transer.: No  Nursing Judgement: No          Lines:   Peripheral IV 06/07/25 Right Antecubital (Active)        Opportunity for questions and clarification was provided.      Patient transported with:  [place] lock  [facility] transport team  1 bag of belongings to include purse, shopping bag with carton of cigarettes, liter, pt has on her bathing suit and a gown

## 2025-06-08 NOTE — ED NOTES
Patient on cardiac monitor, personal belonging placed in ED secured closet shelf 5 and sitter 1:1.
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
## 2025-06-08 NOTE — ED NOTES
Admitting team at the bedside. Patient cleared medically. Pt to be evaluated by psych. Sitter in line of sight of patient.

## 2025-06-08 NOTE — ED PROVIDER NOTES
medical clearance and reassessment.    CONSULTS:  IP CONSULT TO BSMART  IP CONSULT TO TELE-PSYCH (SOCIAL WORK ONLY)    PROCEDURES:     Procedures        (Please note that portions of this note were completed with a voice recognition program.  Efforts were made to edit the dictations but occasionally words are mis-transcribed.)    Lb Carrera MD (electronically signed)  Emergency Attending Physician              Lb Carrera MD  06/07/25 3956

## 2025-06-08 NOTE — BSMART NOTE
BS[facility]ART aware of telepsych recommendation for inpatient admission. Per chart and RN, patient is not medically clear and will most likely be admitted to medical floor. Patient is actively having paranoid delusions, and has mentioned multiple times she is different people:  Sharan Pollard and a doctor. Per her chart this appears to have been the reason for admission in the past. BSMART will continue to follow patient and follow up should she not be medically admitted. If she is medically admitted, RN reports she might be transferred to [facility].

## 2025-06-08 NOTE — ED NOTES
Report given to [facility] transport team to include sbar, plan of care, admit/transfer status, aware of pt hx Faye, psychosis, metabolic acidosis; pt has been redirectable and never violent during this visit, denies SI/HI    Pt currently eating a warm sandwich provided and more cranberry juice, also uncrustable

## 2025-06-08 NOTE — ED NOTES
Pt allowed this nurse to place an additional gown over the front of her for transport  Pt asked again if she can smoke and made aware she cant

## 2025-06-08 NOTE — ED TRIAGE NOTES
Patient brought in via H2 from [facility] ED for transfer for medical admission. Patient did not wish to speak to this RN since she already spoke to the doctor. Patient proceeded to state \"I want to make it clear I am not here for a psych eval, I am in the [employer], and  and we do psych evaluations on our selves every 10 to 15 seconds\". Patient talking in unknown language intermittently.

## 2025-06-08 NOTE — VIRTUAL HEALTH
CHRISTIAN discussed new consult with attending Cameron Glynn.  Pt was assessed on 6/7 with recommendation for psychiatric inpatient admission.  Dr. Glynn agreed to cancel current consult and use recent assessment for next steps.        --CHRISTIAN York on 6/8/2025 at 10:13 AM    An electronic signature was used to authenticate this note.

## 2025-06-08 NOTE — VIRTUAL HEALTH
stabilization per primary team.  Re-consult for any new changes or concerns. Thank you for this consult.  Discussed recommendations with  at time of consult completion.    TelePsych recommendations:Inpatient psychiatric admission            Safety Plan:  Patient refused        Electronically signed by CHRISTIAN Kebede on 6/7/2025 at 9:56 PM.       Ysabelneeta Montilla, was evaluated through a synchronous (real-time) audio-video encounter. The patient (and/or guardian if applicable) is aware that this is a billable service, which includes applicable co-pays. This virtual visit was conducted with patient's (and/or legal guardian's) consent. Patient identification was verified, and a caregiver was present when appropriate.  The patient was located at Facility (Appt Department): [facility] EMERGENCY DEPARTMENT  [address]  [place] [address]  Loc: [phone]  The provider was located at Home (City/State): [place]  Confirm you are appropriately licensed, registered, or certified to deliver care in the state where the patient is located as indicated above. If you are not or unsure, please re-schedule the visit: Yes, I confirm.   [place] Consult to Tele-Psych  Consult performed by: Endy Mazariegos LISW  Consult ordered by: Lb Carrera MD           Total time spent on this encounter: Not billed by time    --CHRISTIAN Kebede on 6/7/2025 at 9:56 PM    An electronic signature was used to authenticate this note.
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
## 2025-06-08 NOTE — ED NOTES
Pt report called to [facility], Behavorial Health Unit, Darcie Talavera RN took report. Will call back and update unit when patient has left [facility].

## 2025-06-09 PROBLEM — F31.9 BIPOLAR 1 DISORDER (HCC): Status: ACTIVE | Noted: 2025-06-09

## 2025-06-09 PROCEDURE — 1240000000 HC EMOTIONAL WELLNESS R&B

## 2025-06-09 PROCEDURE — 6370000000 HC RX 637 (ALT 250 FOR IP): Performed by: PSYCHIATRY & NEUROLOGY

## 2025-06-09 PROCEDURE — 6370000000 HC RX 637 (ALT 250 FOR IP): Performed by: INTERNAL MEDICINE

## 2025-06-09 RX ORDER — INSULIN LISPRO 100 [IU]/ML
0-4 INJECTION, SOLUTION INTRAVENOUS; SUBCUTANEOUS
Status: DISCONTINUED | OUTPATIENT
Start: 2025-06-09 | End: 2025-06-12

## 2025-06-09 RX ORDER — DIVALPROEX SODIUM 500 MG/1
500 TABLET, DELAYED RELEASE ORAL 2 TIMES DAILY
Status: DISCONTINUED | OUTPATIENT
Start: 2025-06-09 | End: 2025-06-13

## 2025-06-09 RX ORDER — DEXTROSE MONOHYDRATE 100 MG/ML
INJECTION, SOLUTION INTRAVENOUS CONTINUOUS PRN
Status: DISCONTINUED | OUTPATIENT
Start: 2025-06-09 | End: 2025-06-12

## 2025-06-09 RX ORDER — HYDROXYZINE HYDROCHLORIDE 50 MG/1
50 TABLET, FILM COATED ORAL DAILY PRN
Status: ON HOLD | COMMUNITY
End: 2025-06-24 | Stop reason: HOSPADM

## 2025-06-09 RX ORDER — TRAZODONE HYDROCHLORIDE 150 MG/1
150 TABLET ORAL NIGHTLY
Status: ON HOLD | COMMUNITY
End: 2025-06-09 | Stop reason: ALTCHOICE

## 2025-06-09 RX ORDER — POTASSIUM CHLORIDE 750 MG/1
40 TABLET, EXTENDED RELEASE ORAL ONCE
Status: COMPLETED | OUTPATIENT
Start: 2025-06-09 | End: 2025-06-09

## 2025-06-09 RX ORDER — IODINE/SODIUM IODIDE 2 %
TINCTURE TOPICAL 2 TIMES DAILY
Status: DISCONTINUED | OUTPATIENT
Start: 2025-06-09 | End: 2025-06-12

## 2025-06-09 RX ORDER — DIVALPROEX SODIUM 500 MG/1
500 TABLET, DELAYED RELEASE ORAL 2 TIMES DAILY
Status: ON HOLD | COMMUNITY
End: 2025-06-24 | Stop reason: HOSPADM

## 2025-06-09 RX ORDER — DIAZEPAM 10 MG/1
10 TABLET ORAL 2 TIMES DAILY PRN
Status: ON HOLD | COMMUNITY
End: 2025-06-24 | Stop reason: HOSPADM

## 2025-06-09 RX ORDER — LURASIDONE HYDROCHLORIDE 40 MG/1
40 TABLET, FILM COATED ORAL
Status: DISCONTINUED | OUTPATIENT
Start: 2025-06-09 | End: 2025-06-10

## 2025-06-09 RX ORDER — DIAZEPAM 10 MG/1
10 TABLET ORAL 2 TIMES DAILY PRN
Status: ON HOLD | COMMUNITY
End: 2025-06-09 | Stop reason: ALTCHOICE

## 2025-06-09 RX ADMIN — FERRIC OXIDE RED: 8; 8 LOTION TOPICAL at 20:36

## 2025-06-09 RX ADMIN — METFORMIN HYDROCHLORIDE 500 MG: 500 TABLET ORAL at 18:02

## 2025-06-09 RX ADMIN — DIVALPROEX SODIUM 500 MG: 500 TABLET, DELAYED RELEASE ORAL at 20:11

## 2025-06-09 RX ADMIN — POTASSIUM CHLORIDE 40 MEQ: 750 TABLET, FILM COATED, EXTENDED RELEASE ORAL at 20:36

## 2025-06-09 RX ADMIN — LURASIDONE HYDROCHLORIDE 40 MG: 40 TABLET, FILM COATED ORAL at 16:52

## 2025-06-09 RX ADMIN — HALOPERIDOL 5 MG: 5 TABLET ORAL at 00:27

## 2025-06-09 RX ADMIN — DIVALPROEX SODIUM 500 MG: 500 TABLET, DELAYED RELEASE ORAL at 14:18

## 2025-06-09 RX ADMIN — HYDROXYZINE HYDROCHLORIDE 50 MG: 25 TABLET ORAL at 00:27

## 2025-06-09 ASSESSMENT — PAIN SCALES - GENERAL: PAINLEVEL_OUTOF10: 0

## 2025-06-09 NOTE — BSMART NOTE
[facility] checked on status of patient transfer. Per charge, Clyde DUFFY has shown up but is waiting for  in order to transport. Patient is seen laying in bed with no concerns at this time.   
Don w/ Maria C gary/ Lashaun Frank who confirms they are recommending TDO admission and will be starting a statewide bed search for placement.   
Received message from Janet Strong who confirms pt has been accepted to [facility] by Bharath/Kunal and will be placed in bed #311-B.  Janet confirms [place] has been notified. JENNIFER Espitia notified. Pending police transport at this time.    RN2RN #[phone]  
Crisis to relay request for TDO prescreen evaluation d/t pt being involuntary for psychiatric hospitalization. Will fax clinicals shortly for review.     Barriers to Discharge: Pending TDO Prescreen Evaluation  Guns in the home: COLLETTE  Outpatient provider(s):  COLLETTE  Insurance info/prescription coverage:  [employer] [facility]     Diagnosis: Schizophrenia per Hx    Plan:  Current ED Medical Provider, Dr. Cameron Glynn confirms pt has been medically cleared and is being recommended for psychiatric inpatient treatment per Tele-psych consult from yesterday, 6/7. Dr. Glynn is requesting for Lashaun Frank to complete a TDO prescreen evaluation d/t pt being involuntary for admission at this time. Lashaun Frank has been notified.   Consult to Tele-Psych Ordered? Yes Evaluated by Tele-Psych? Yes  Tele-Psych Re-eval completed? No      Participating treatment team members: Ysabel Montilla, Chana Morris LMSW

## 2025-06-09 NOTE — ED NOTES
9:31 PM  Change of shift. Care of patient taken over from Dr Ordonez; H&P reviewed, bedside handoff complete.  Awaiting transfer to [facility] for psych admission.      Transferred uneventfully.     Carlos Gordon MD  06/08/25 5712

## 2025-06-09 NOTE — CARE COORDINATION
06/09/25 1558   ITP   Date of Plan 06/09/25   Primary Diagnosis Code Bipolar 1   Barriers to Treatment Client resistance;Need for psychoeducation   Strengths Incorporated in Plan Verbal   Plan of Care   Long Term Goal (LTG) Stated in patient/guardian terms remain safe and stable in the community   Short Term Goal 1   Short Term Goal 1 Client will maintain compliance with medication regime   Baseline Functioning client symptoms are not being effectively managed by current medication/lack of medication   Target client will take medication as prescribed and report a reduction in symptoms of psychosis   Objectives Client will participate in group therapy;Other (comment)  (meet with treatment team)   Intervention  Monitor medications   Frequency daily   Measured by Self report;Staff observation   Staff Responsible Clinical staff;[facility] staff   Intervention 2 Referral to community services   Frequency prior to discharge   Measured by Self report;Staff observation   Staff Responsible Clinical staff;[facility] staff   STG Goal 1 Status: Patient Appears to be  Treatment plan goal is unmet at this time   Short Term Goal 2   Short Term Goal 2 Client will learn and demonstrate effective coping skills   Baseline Functioning client has been identified by themselves or others as requiring hospitalization to remain safe   Target client will learn and use effective coping skills to remain safe in the community and reduce likelihood of rehospitalization   Objectives Client will participate in group therapy;Other (comment)   Intervention  Group therapy;Milieu therapy and support   Frequency on going   Measured by Self report;Staff observation   Staff Responsible Clinical staff;[facility] staff   Intervention 2 Referral to community services   Frequency prior to discharge   Measured by Staff observation;Self report   Staff Responsible Clinical staff;[facility] staff   STG Goal 2 Status: Patient Appears to be  Treatment plan goal is unmet at this time

## 2025-06-09 NOTE — CARE COORDINATION
06/09/25 1557   Suicidal Ideation   Wish to be Dead Lifetime - No   Non-Specific Active Suicidal Thoughts Lifetime - No   Suicidal Behavior Trigger No Selected   Suicidal Behavior   Actual Attempt Lifetime - No   Has subject engaged in Non-Suicidal Self-Injurious Behavior? Lifetime - No   Interrupted Attempt Lifetime - No   Aborted or Self-Interrupted Attempt Lifetime - No

## 2025-06-09 NOTE — ED PROVIDER NOTES
8:50 PM  Accepted to [facility]. Awaiting bed to be ready and transport.      Harrison Ordonez MD  06/08/25 2051    
Emergency Department Physician who either signs or Co-signs this chart in the absence of a cardiologist.         CRITICAL CARE TIME   Total Critical Care time was 0 minutes, excluding separately reportable procedures.  There was a high probability of clinically significant/life threatening deterioration in the patient's condition which required my urgent intervention.     CONSULTS:  None    PROCEDURES:  Unless otherwise noted below, none     Procedures    FINAL IMPRESSION      1. Acute psychosis (HCC)    2. Lactic acidosis    3. Dehydration          DISPOSITION/PLAN   DISPOSITION Decision To Admit 06/08/2025 04:11:01 AM    PATIENT REFERRED TO:  No follow-up provider specified.    DISCHARGE MEDICATIONS:  New Prescriptions    No medications on file     Controlled Substances Monitoring:          No data to display                (Please note that portions of this note were completed with a voice recognition program.  Efforts were made to edit the dictations but occasionally words are mis-transcribed.)    Daquan Tucker MD (electronically signed)  Attending Emergency Physician            Daquan Tucker MD  06/08/25 0695    
of creatinine, excessive creatine ingestion, or following therapy that affects renal tubular secretion.      POC Ionized Calcium 06/08/2025 1.13 (L)  1.15 - 1.33 mmol/L Final    POC Lactic Acid 06/08/2025 2.96 (HH)  0.40 - 2.00 mmol/L Final    Source 06/08/2025 VENOUS BLOOD    Final    Critical Value Read Back 06/08/2025 BLANE   Final    POC Lactic Acid 06/08/2025 2.70 (HH)  0.40 - 2.00 mmol/L Final       No orders to display           Cameron Glynn MD  06/08/25 4018

## 2025-06-09 NOTE — GROUP NOTE
Group Therapy Note    Date: 6/9/2025    Group Start Time: 1100  Group End Time: 1145  Group Topic: Relaxation    [facility] 3 ACUTE BEHAV [facility]    Lilly Hanley        Group Therapy Note    Attendees: 3       Patient's Goal:  To participate in relaxation activity    Notes:  Pt did not attend session    Discipline Responsible: Recreational Therapist      Signature:  LILLY HANLEY
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
## 2025-06-10 LAB
ANION GAP SERPL CALC-SCNC: 9 MMOL/L (ref 2–12)
BUN SERPL-MCNC: 7 MG/DL (ref 6–20)
BUN/CREAT SERPL: 10 (ref 12–20)
CALCIUM SERPL-MCNC: 8.9 MG/DL (ref 8.5–10.1)
CHLORIDE SERPL-SCNC: 106 MMOL/L (ref 97–108)
CO2 SERPL-SCNC: 24 MMOL/L (ref 21–32)
CREAT SERPL-MCNC: 0.67 MG/DL (ref 0.55–1.02)
GLUCOSE SERPL-MCNC: 102 MG/DL (ref 65–100)
POTASSIUM SERPL-SCNC: 4.2 MMOL/L (ref 3.5–5.1)
SODIUM SERPL-SCNC: 139 MMOL/L (ref 136–145)

## 2025-06-10 PROCEDURE — 6370000000 HC RX 637 (ALT 250 FOR IP): Performed by: INTERNAL MEDICINE

## 2025-06-10 PROCEDURE — 6370000000 HC RX 637 (ALT 250 FOR IP): Performed by: PSYCHIATRY & NEUROLOGY

## 2025-06-10 PROCEDURE — 36415 COLL VENOUS BLD VENIPUNCTURE: CPT

## 2025-06-10 PROCEDURE — 1240000000 HC EMOTIONAL WELLNESS R&B

## 2025-06-10 PROCEDURE — 80048 BASIC METABOLIC PNL TOTAL CA: CPT

## 2025-06-10 RX ADMIN — DIVALPROEX SODIUM 500 MG: 500 TABLET, DELAYED RELEASE ORAL at 08:23

## 2025-06-10 RX ADMIN — DIVALPROEX SODIUM 500 MG: 500 TABLET, DELAYED RELEASE ORAL at 20:09

## 2025-06-10 RX ADMIN — LURASIDONE HYDROCHLORIDE 60 MG: 40 TABLET, FILM COATED ORAL at 16:42

## 2025-06-10 RX ADMIN — NICOTINE POLACRILEX 2 MG: 2 LOZENGE ORAL at 20:09

## 2025-06-10 RX ADMIN — METFORMIN HYDROCHLORIDE 500 MG: 500 TABLET ORAL at 08:23

## 2025-06-10 RX ADMIN — FERRIC OXIDE RED: 8; 8 LOTION TOPICAL at 08:22

## 2025-06-10 RX ADMIN — METFORMIN HYDROCHLORIDE 500 MG: 500 TABLET ORAL at 16:42

## 2025-06-10 RX ADMIN — HYDROXYZINE HYDROCHLORIDE 50 MG: 25 TABLET ORAL at 20:09

## 2025-06-10 ASSESSMENT — PAIN SCALES - GENERAL
PAINLEVEL_OUTOF10: 0
PAINLEVEL_OUTOF10: 0

## 2025-06-10 NOTE — GROUP NOTE
Group Therapy Note    Date: 6/10/2025    Group Start Time: 1400  Group End Time: 1500  Group Topic: Recreational    [facility] AC[facility]    Lilly Hanley        Group Therapy Note    Attendees: 6       Patient's Goal:  To concentrate on selected task    Notes:  Pt did not attend session  Discipline Responsible: Recreational Therapist      Signature:  LILLY HANLEY
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
## 2025-06-11 LAB
GLUCOSE BLD STRIP.AUTO-MCNC: 113 MG/DL (ref 65–117)
GLUCOSE BLD STRIP.AUTO-MCNC: 153 MG/DL (ref 65–117)
SERVICE CMNT-IMP: ABNORMAL
SERVICE CMNT-IMP: NORMAL

## 2025-06-11 PROCEDURE — 6370000000 HC RX 637 (ALT 250 FOR IP): Performed by: INTERNAL MEDICINE

## 2025-06-11 PROCEDURE — 1240000000 HC EMOTIONAL WELLNESS R&B

## 2025-06-11 PROCEDURE — 6370000000 HC RX 637 (ALT 250 FOR IP): Performed by: PSYCHIATRY & NEUROLOGY

## 2025-06-11 PROCEDURE — 82962 GLUCOSE BLOOD TEST: CPT

## 2025-06-11 RX ADMIN — LURASIDONE HYDROCHLORIDE 60 MG: 40 TABLET, FILM COATED ORAL at 16:34

## 2025-06-11 RX ADMIN — NICOTINE POLACRILEX 2 MG: 2 LOZENGE ORAL at 20:12

## 2025-06-11 RX ADMIN — HYDROXYZINE HYDROCHLORIDE 50 MG: 25 TABLET ORAL at 20:12

## 2025-06-11 RX ADMIN — DIVALPROEX SODIUM 500 MG: 500 TABLET, DELAYED RELEASE ORAL at 08:32

## 2025-06-11 RX ADMIN — METFORMIN HYDROCHLORIDE 500 MG: 500 TABLET ORAL at 08:32

## 2025-06-11 RX ADMIN — METFORMIN HYDROCHLORIDE 500 MG: 500 TABLET ORAL at 16:34

## 2025-06-11 RX ADMIN — HYDROXYZINE HYDROCHLORIDE 50 MG: 25 TABLET ORAL at 08:44

## 2025-06-11 RX ADMIN — DIVALPROEX SODIUM 500 MG: 500 TABLET, DELAYED RELEASE ORAL at 20:12

## 2025-06-11 ASSESSMENT — PAIN SCALES - GENERAL
PAINLEVEL_OUTOF10: 0
PAINLEVEL_OUTOF10: 0

## 2025-06-11 NOTE — GROUP NOTE
Group Therapy Note    Date: 6/11/2025    Group Start Time: 0830  Group End Time: 0845  Group Topic: Community Meeting    [facility] 3 ACUTE BEHAV HL[facility] Love, Jasmine        Group Therapy Note    Attendees:       Patient's Goal:  8/10    Notes:  To actually make it back to where I came from and back to family.    Status After Intervention:  Unchanged    Participation Level: Active Listener    Participation Quality: Appropriate      Speech:  normal      Thought Process/Content: Delusional  Hallucinating      Affective Functioning: Congruent      Mood: euphoric      Level of consciousness:  Alert      Response to Learning: Able to retain information      Endings: None Reported    Modes of Intervention: Support      Discipline Responsible: Behavorial Health Tech      Signature:  Khadra Carvalho

## 2025-06-11 NOTE — GROUP NOTE
Group Therapy Note    Date: 6/11/2025    Group Start Time: 1400  Group End Time: 1500  Group Topic: Recreational    [facility]    Lilly Hanley        Group Therapy Note    Attendees: 6       Patient's Goal:  To concentrate on selected task    Notes:  Pt did not attend session  Discipline Responsible: Recreational Therapist      Signature:  LILLY HANLEY

## 2025-06-12 LAB
GLUCOSE BLD STRIP.AUTO-MCNC: 117 MG/DL (ref 65–117)
SERVICE CMNT-IMP: NORMAL

## 2025-06-12 PROCEDURE — 6370000000 HC RX 637 (ALT 250 FOR IP): Performed by: PSYCHIATRY & NEUROLOGY

## 2025-06-12 PROCEDURE — 6370000000 HC RX 637 (ALT 250 FOR IP): Performed by: INTERNAL MEDICINE

## 2025-06-12 PROCEDURE — 1240000000 HC EMOTIONAL WELLNESS R&B

## 2025-06-12 PROCEDURE — 82962 GLUCOSE BLOOD TEST: CPT

## 2025-06-12 RX ORDER — LURASIDONE HYDROCHLORIDE 40 MG/1
80 TABLET, FILM COATED ORAL
Status: DISCONTINUED | OUTPATIENT
Start: 2025-06-12 | End: 2025-06-16

## 2025-06-12 RX ORDER — MECOBALAMIN 5000 MCG
10 TABLET,DISINTEGRATING ORAL NIGHTLY
Status: DISCONTINUED | OUTPATIENT
Start: 2025-06-12 | End: 2025-06-24 | Stop reason: HOSPADM

## 2025-06-12 RX ADMIN — Medication 10 MG: at 20:38

## 2025-06-12 RX ADMIN — DIVALPROEX SODIUM 500 MG: 500 TABLET, DELAYED RELEASE ORAL at 08:27

## 2025-06-12 RX ADMIN — NICOTINE POLACRILEX 2 MG: 2 LOZENGE ORAL at 08:27

## 2025-06-12 RX ADMIN — NICOTINE POLACRILEX 2 MG: 2 LOZENGE ORAL at 20:40

## 2025-06-12 RX ADMIN — HYDROXYZINE HYDROCHLORIDE 50 MG: 25 TABLET ORAL at 20:38

## 2025-06-12 RX ADMIN — METFORMIN HYDROCHLORIDE 500 MG: 500 TABLET ORAL at 17:36

## 2025-06-12 RX ADMIN — NICOTINE POLACRILEX 2 MG: 2 LOZENGE ORAL at 17:37

## 2025-06-12 RX ADMIN — METFORMIN HYDROCHLORIDE 500 MG: 500 TABLET ORAL at 08:27

## 2025-06-12 RX ADMIN — HYDROXYZINE HYDROCHLORIDE 50 MG: 25 TABLET ORAL at 15:09

## 2025-06-12 RX ADMIN — NICOTINE POLACRILEX 2 MG: 2 LOZENGE ORAL at 15:09

## 2025-06-12 RX ADMIN — DIVALPROEX SODIUM 500 MG: 500 TABLET, DELAYED RELEASE ORAL at 20:38

## 2025-06-12 RX ADMIN — LURASIDONE HYDROCHLORIDE 80 MG: 40 TABLET, FILM COATED ORAL at 17:36

## 2025-06-12 ASSESSMENT — PAIN SCALES - GENERAL
PAINLEVEL_OUTOF10: 0
PAINLEVEL_OUTOF10: 0

## 2025-06-12 NOTE — GROUP NOTE
Group Therapy Note    Date: 6/12/2025    Group Start Time: 1400  Group End Time: 1500  Group Topic: Recreational    [facility]    Lilly Hanley        Group Therapy Note    Attendees: 3       Patient's Goal:  To concentrate on selected task    Notes:  Pt did not attend session  Discipline Responsible: Recreational Therapist      Signature:  LILLY HANLEY

## 2025-06-13 LAB
CHOLEST SERPL-MCNC: 154 MG/DL
EST. AVERAGE GLUCOSE BLD GHB EST-MCNC: 146 MG/DL
GLUCOSE BLD STRIP.AUTO-MCNC: 134 MG/DL (ref 65–117)
HBA1C MFR BLD: 6.7 % (ref 4–5.6)
HDLC SERPL-MCNC: 36 MG/DL
HDLC SERPL: 4.3 (ref 0–5)
LDLC SERPL CALC-MCNC: 73.4 MG/DL (ref 0–100)
SERVICE CMNT-IMP: ABNORMAL
TRIGL SERPL-MCNC: 223 MG/DL
VALPROATE SERPL-MCNC: 59 UG/ML (ref 50–100)
VLDLC SERPL CALC-MCNC: 44.6 MG/DL

## 2025-06-13 PROCEDURE — 83036 HEMOGLOBIN GLYCOSYLATED A1C: CPT

## 2025-06-13 PROCEDURE — 80061 LIPID PANEL: CPT

## 2025-06-13 PROCEDURE — 80164 ASSAY DIPROPYLACETIC ACD TOT: CPT

## 2025-06-13 PROCEDURE — 6370000000 HC RX 637 (ALT 250 FOR IP): Performed by: PSYCHIATRY & NEUROLOGY

## 2025-06-13 PROCEDURE — 36415 COLL VENOUS BLD VENIPUNCTURE: CPT

## 2025-06-13 PROCEDURE — 6370000000 HC RX 637 (ALT 250 FOR IP): Performed by: INTERNAL MEDICINE

## 2025-06-13 PROCEDURE — 1240000000 HC EMOTIONAL WELLNESS R&B

## 2025-06-13 PROCEDURE — 82962 GLUCOSE BLOOD TEST: CPT

## 2025-06-13 RX ORDER — DIVALPROEX SODIUM 500 MG/1
1000 TABLET, DELAYED RELEASE ORAL 2 TIMES DAILY
Status: DISCONTINUED | OUTPATIENT
Start: 2025-06-13 | End: 2025-06-24 | Stop reason: HOSPADM

## 2025-06-13 RX ADMIN — NICOTINE POLACRILEX 2 MG: 2 LOZENGE ORAL at 14:20

## 2025-06-13 RX ADMIN — NICOTINE POLACRILEX 2 MG: 2 LOZENGE ORAL at 08:52

## 2025-06-13 RX ADMIN — DIVALPROEX SODIUM 500 MG: 500 TABLET, DELAYED RELEASE ORAL at 08:48

## 2025-06-13 RX ADMIN — NICOTINE POLACRILEX 2 MG: 2 LOZENGE ORAL at 20:37

## 2025-06-13 RX ADMIN — HALOPERIDOL 5 MG: 5 TABLET ORAL at 20:40

## 2025-06-13 RX ADMIN — LURASIDONE HYDROCHLORIDE 80 MG: 40 TABLET, FILM COATED ORAL at 17:23

## 2025-06-13 RX ADMIN — METFORMIN HYDROCHLORIDE 500 MG: 500 TABLET ORAL at 17:24

## 2025-06-13 RX ADMIN — DIVALPROEX SODIUM 1000 MG: 500 TABLET, DELAYED RELEASE ORAL at 20:38

## 2025-06-13 RX ADMIN — METFORMIN HYDROCHLORIDE 500 MG: 500 TABLET ORAL at 08:48

## 2025-06-13 RX ADMIN — HYDROXYZINE HYDROCHLORIDE 50 MG: 25 TABLET ORAL at 17:27

## 2025-06-13 RX ADMIN — NICOTINE POLACRILEX 2 MG: 2 LOZENGE ORAL at 17:25

## 2025-06-13 RX ADMIN — Medication 10 MG: at 20:38

## 2025-06-14 LAB
GLUCOSE BLD STRIP.AUTO-MCNC: 97 MG/DL (ref 65–117)
SERVICE CMNT-IMP: NORMAL

## 2025-06-14 PROCEDURE — 6370000000 HC RX 637 (ALT 250 FOR IP): Performed by: PSYCHIATRY & NEUROLOGY

## 2025-06-14 PROCEDURE — 6370000000 HC RX 637 (ALT 250 FOR IP): Performed by: INTERNAL MEDICINE

## 2025-06-14 PROCEDURE — 82962 GLUCOSE BLOOD TEST: CPT

## 2025-06-14 PROCEDURE — 1240000000 HC EMOTIONAL WELLNESS R&B

## 2025-06-14 RX ADMIN — Medication 10 MG: at 21:39

## 2025-06-14 RX ADMIN — HYDROXYZINE HYDROCHLORIDE 50 MG: 25 TABLET ORAL at 08:21

## 2025-06-14 RX ADMIN — DIVALPROEX SODIUM 1000 MG: 500 TABLET, DELAYED RELEASE ORAL at 21:39

## 2025-06-14 RX ADMIN — NICOTINE POLACRILEX 2 MG: 2 LOZENGE ORAL at 16:29

## 2025-06-14 RX ADMIN — DIVALPROEX SODIUM 1000 MG: 500 TABLET, DELAYED RELEASE ORAL at 08:17

## 2025-06-14 RX ADMIN — HYDROXYZINE HYDROCHLORIDE 50 MG: 25 TABLET ORAL at 21:39

## 2025-06-14 RX ADMIN — METFORMIN HYDROCHLORIDE 500 MG: 500 TABLET ORAL at 08:17

## 2025-06-14 RX ADMIN — NICOTINE POLACRILEX 2 MG: 2 LOZENGE ORAL at 21:39

## 2025-06-14 RX ADMIN — METFORMIN HYDROCHLORIDE 500 MG: 500 TABLET ORAL at 16:29

## 2025-06-14 RX ADMIN — LURASIDONE HYDROCHLORIDE 80 MG: 40 TABLET, FILM COATED ORAL at 16:29

## 2025-06-14 ASSESSMENT — PAIN SCALES - GENERAL: PAINLEVEL_OUTOF10: 0

## 2025-06-15 PROCEDURE — 6370000000 HC RX 637 (ALT 250 FOR IP): Performed by: INTERNAL MEDICINE

## 2025-06-15 PROCEDURE — 6370000000 HC RX 637 (ALT 250 FOR IP): Performed by: PSYCHIATRY & NEUROLOGY

## 2025-06-15 PROCEDURE — 1240000000 HC EMOTIONAL WELLNESS R&B

## 2025-06-15 RX ADMIN — NICOTINE POLACRILEX 2 MG: 2 LOZENGE ORAL at 12:25

## 2025-06-15 RX ADMIN — NICOTINE POLACRILEX 2 MG: 2 LOZENGE ORAL at 08:36

## 2025-06-15 RX ADMIN — HALOPERIDOL 5 MG: 5 TABLET ORAL at 12:25

## 2025-06-15 RX ADMIN — HYDROXYZINE HYDROCHLORIDE 50 MG: 25 TABLET ORAL at 12:25

## 2025-06-15 RX ADMIN — Medication 10 MG: at 21:46

## 2025-06-15 RX ADMIN — DIVALPROEX SODIUM 1000 MG: 500 TABLET, DELAYED RELEASE ORAL at 08:33

## 2025-06-15 RX ADMIN — DIVALPROEX SODIUM 1000 MG: 500 TABLET, DELAYED RELEASE ORAL at 21:46

## 2025-06-15 RX ADMIN — HYDROXYZINE HYDROCHLORIDE 50 MG: 25 TABLET ORAL at 22:04

## 2025-06-15 RX ADMIN — NICOTINE POLACRILEX 2 MG: 2 LOZENGE ORAL at 17:06

## 2025-06-15 RX ADMIN — NICOTINE POLACRILEX 2 MG: 2 LOZENGE ORAL at 14:44

## 2025-06-15 RX ADMIN — LURASIDONE HYDROCHLORIDE 80 MG: 40 TABLET, FILM COATED ORAL at 16:56

## 2025-06-15 RX ADMIN — METFORMIN HYDROCHLORIDE 500 MG: 500 TABLET ORAL at 16:56

## 2025-06-15 RX ADMIN — METFORMIN HYDROCHLORIDE 500 MG: 500 TABLET ORAL at 08:33

## 2025-06-15 RX ADMIN — NICOTINE POLACRILEX 2 MG: 2 LOZENGE ORAL at 21:46

## 2025-06-15 ASSESSMENT — PAIN SCALES - GENERAL
PAINLEVEL_OUTOF10: 0
PAINLEVEL_OUTOF10: 0

## 2025-06-16 LAB
GLUCOSE BLD STRIP.AUTO-MCNC: 158 MG/DL (ref 65–117)
SERVICE CMNT-IMP: ABNORMAL

## 2025-06-16 PROCEDURE — 6370000000 HC RX 637 (ALT 250 FOR IP): Performed by: INTERNAL MEDICINE

## 2025-06-16 PROCEDURE — 82962 GLUCOSE BLOOD TEST: CPT

## 2025-06-16 PROCEDURE — 6370000000 HC RX 637 (ALT 250 FOR IP): Performed by: PSYCHIATRY & NEUROLOGY

## 2025-06-16 PROCEDURE — 99232 SBSQ HOSP IP/OBS MODERATE 35: CPT | Performed by: PSYCHIATRY & NEUROLOGY

## 2025-06-16 PROCEDURE — 1240000000 HC EMOTIONAL WELLNESS R&B

## 2025-06-16 RX ORDER — LURASIDONE HYDROCHLORIDE 40 MG/1
120 TABLET, FILM COATED ORAL
Status: DISCONTINUED | OUTPATIENT
Start: 2025-06-17 | End: 2025-06-20

## 2025-06-16 RX ADMIN — HALOPERIDOL 5 MG: 5 TABLET ORAL at 18:47

## 2025-06-16 RX ADMIN — NICOTINE POLACRILEX 2 MG: 2 LOZENGE ORAL at 15:22

## 2025-06-16 RX ADMIN — NICOTINE POLACRILEX 2 MG: 2 LOZENGE ORAL at 08:46

## 2025-06-16 RX ADMIN — METFORMIN HYDROCHLORIDE 500 MG: 500 TABLET ORAL at 17:15

## 2025-06-16 RX ADMIN — LURASIDONE HYDROCHLORIDE 80 MG: 40 TABLET, FILM COATED ORAL at 17:15

## 2025-06-16 RX ADMIN — HYDROXYZINE HYDROCHLORIDE 50 MG: 25 TABLET ORAL at 18:47

## 2025-06-16 RX ADMIN — DIVALPROEX SODIUM 1000 MG: 500 TABLET, DELAYED RELEASE ORAL at 20:40

## 2025-06-16 RX ADMIN — NICOTINE POLACRILEX 2 MG: 2 LOZENGE ORAL at 20:39

## 2025-06-16 RX ADMIN — NICOTINE POLACRILEX 2 MG: 2 LOZENGE ORAL at 17:18

## 2025-06-16 RX ADMIN — METFORMIN HYDROCHLORIDE 500 MG: 500 TABLET ORAL at 08:46

## 2025-06-16 RX ADMIN — HYDROXYZINE HYDROCHLORIDE 50 MG: 25 TABLET ORAL at 08:46

## 2025-06-16 RX ADMIN — DIVALPROEX SODIUM 1000 MG: 500 TABLET, DELAYED RELEASE ORAL at 08:46

## 2025-06-16 RX ADMIN — Medication 10 MG: at 20:39

## 2025-06-16 ASSESSMENT — PAIN SCALES - GENERAL
PAINLEVEL_OUTOF10: 0

## 2025-06-17 PROBLEM — L02.91 ABSCESS: Status: ACTIVE | Noted: 2025-06-17

## 2025-06-17 LAB
GLUCOSE BLD STRIP.AUTO-MCNC: 163 MG/DL (ref 65–117)
GLUCOSE BLD STRIP.AUTO-MCNC: 171 MG/DL (ref 65–117)
SERVICE CMNT-IMP: ABNORMAL
SERVICE CMNT-IMP: ABNORMAL

## 2025-06-17 PROCEDURE — 6370000000 HC RX 637 (ALT 250 FOR IP): Performed by: PSYCHIATRY & NEUROLOGY

## 2025-06-17 PROCEDURE — 6370000000 HC RX 637 (ALT 250 FOR IP): Performed by: INTERNAL MEDICINE

## 2025-06-17 PROCEDURE — 99232 SBSQ HOSP IP/OBS MODERATE 35: CPT | Performed by: PSYCHIATRY & NEUROLOGY

## 2025-06-17 PROCEDURE — 10060 I&D ABSCESS SIMPLE/SINGLE: CPT | Performed by: SURGERY

## 2025-06-17 PROCEDURE — 1240000000 HC EMOTIONAL WELLNESS R&B

## 2025-06-17 PROCEDURE — 99221 1ST HOSP IP/OBS SF/LOW 40: CPT | Performed by: SURGERY

## 2025-06-17 PROCEDURE — 82962 GLUCOSE BLOOD TEST: CPT

## 2025-06-17 PROCEDURE — 6360000002 HC RX W HCPCS: Performed by: SURGERY

## 2025-06-17 PROCEDURE — 6370000000 HC RX 637 (ALT 250 FOR IP): Performed by: SURGERY

## 2025-06-17 RX ORDER — ACETAMINOPHEN 500 MG
1000 TABLET ORAL EVERY 6 HOURS PRN
Status: DISCONTINUED | OUTPATIENT
Start: 2025-06-17 | End: 2025-06-24 | Stop reason: HOSPADM

## 2025-06-17 RX ORDER — LIDOCAINE HYDROCHLORIDE AND EPINEPHRINE 10; 10 MG/ML; UG/ML
20 INJECTION, SOLUTION INFILTRATION; PERINEURAL ONCE
Status: COMPLETED | OUTPATIENT
Start: 2025-06-17 | End: 2025-06-17

## 2025-06-17 RX ADMIN — DIVALPROEX SODIUM 1000 MG: 500 TABLET, DELAYED RELEASE ORAL at 08:29

## 2025-06-17 RX ADMIN — HYDROXYZINE HYDROCHLORIDE 50 MG: 25 TABLET ORAL at 17:41

## 2025-06-17 RX ADMIN — NICOTINE POLACRILEX 2 MG: 2 LOZENGE ORAL at 08:29

## 2025-06-17 RX ADMIN — DIVALPROEX SODIUM 1000 MG: 500 TABLET, DELAYED RELEASE ORAL at 21:14

## 2025-06-17 RX ADMIN — LURASIDONE HYDROCHLORIDE 120 MG: 40 TABLET, FILM COATED ORAL at 16:42

## 2025-06-17 RX ADMIN — ACETAMINOPHEN 1000 MG: 500 TABLET ORAL at 21:13

## 2025-06-17 RX ADMIN — NICOTINE POLACRILEX 2 MG: 2 LOZENGE ORAL at 13:46

## 2025-06-17 RX ADMIN — HALOPERIDOL 5 MG: 5 TABLET ORAL at 17:41

## 2025-06-17 RX ADMIN — METFORMIN HYDROCHLORIDE 500 MG: 500 TABLET ORAL at 08:29

## 2025-06-17 RX ADMIN — Medication 10 MG: at 21:14

## 2025-06-17 RX ADMIN — METFORMIN HYDROCHLORIDE 500 MG: 500 TABLET ORAL at 16:42

## 2025-06-17 RX ADMIN — NICOTINE POLACRILEX 2 MG: 2 LOZENGE ORAL at 17:41

## 2025-06-17 RX ADMIN — LIDOCAINE HYDROCHLORIDE,EPINEPHRINE BITARTRATE 20 ML: 10; .01 INJECTION, SOLUTION INFILTRATION; PERINEURAL at 17:58

## 2025-06-17 RX ADMIN — NICOTINE POLACRILEX 2 MG: 2 LOZENGE ORAL at 21:13

## 2025-06-17 ASSESSMENT — PAIN DESCRIPTION - DESCRIPTORS: DESCRIPTORS: ACHING

## 2025-06-17 ASSESSMENT — PAIN DESCRIPTION - LOCATION: LOCATION: CHEST

## 2025-06-17 ASSESSMENT — PAIN SCALES - GENERAL
PAINLEVEL_OUTOF10: 7
PAINLEVEL_OUTOF10: 0

## 2025-06-17 ASSESSMENT — PAIN DESCRIPTION - ORIENTATION: ORIENTATION: MID

## 2025-06-17 NOTE — GROUP NOTE
Group Therapy Note    Date: 6/17/2025    Group Start Time: 0830  Group End Time: 0845  Group Topic: Community Meeting    [facility] 3 ACUTE BEHAV HLVandana Naqvi        Group Therapy Note    Attendees: 16       Patient's Goal:  0    Notes:  none    Status After Intervention:  Unchanged    Participation Level: Active Listener and Interactive    Participation Quality: Intrusive      Speech:  mute      Thought Process/Content: Logical      Affective Functioning: Flat      Mood: euphoric      Level of consciousness:  Attentive      Response to Learning: Able to retain information      Endings: None Reported    Modes of Intervention: Limit-setting      Discipline Responsible: Behavorial Health Tech      Signature:  VANDANA FERREIRA
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
## 2025-06-18 LAB
GLUCOSE BLD STRIP.AUTO-MCNC: 100 MG/DL (ref 65–117)
GLUCOSE BLD STRIP.AUTO-MCNC: 104 MG/DL (ref 65–117)
SERVICE CMNT-IMP: NORMAL
SERVICE CMNT-IMP: NORMAL

## 2025-06-18 PROCEDURE — 6370000000 HC RX 637 (ALT 250 FOR IP): Performed by: PSYCHIATRY & NEUROLOGY

## 2025-06-18 PROCEDURE — 99232 SBSQ HOSP IP/OBS MODERATE 35: CPT | Performed by: PSYCHIATRY & NEUROLOGY

## 2025-06-18 PROCEDURE — 1240000000 HC EMOTIONAL WELLNESS R&B

## 2025-06-18 PROCEDURE — 6370000000 HC RX 637 (ALT 250 FOR IP): Performed by: INTERNAL MEDICINE

## 2025-06-18 PROCEDURE — 82962 GLUCOSE BLOOD TEST: CPT

## 2025-06-18 RX ADMIN — NICOTINE POLACRILEX 2 MG: 2 LOZENGE ORAL at 15:42

## 2025-06-18 RX ADMIN — LURASIDONE HYDROCHLORIDE 120 MG: 40 TABLET, FILM COATED ORAL at 16:43

## 2025-06-18 RX ADMIN — DIVALPROEX SODIUM 1000 MG: 500 TABLET, DELAYED RELEASE ORAL at 08:01

## 2025-06-18 RX ADMIN — METFORMIN HYDROCHLORIDE 500 MG: 500 TABLET ORAL at 08:01

## 2025-06-18 RX ADMIN — Medication 10 MG: at 21:14

## 2025-06-18 RX ADMIN — NICOTINE POLACRILEX 2 MG: 2 LOZENGE ORAL at 19:07

## 2025-06-18 RX ADMIN — METFORMIN HYDROCHLORIDE 500 MG: 500 TABLET ORAL at 16:43

## 2025-06-18 RX ADMIN — HYDROXYZINE HYDROCHLORIDE 50 MG: 25 TABLET ORAL at 15:42

## 2025-06-18 RX ADMIN — HALOPERIDOL 5 MG: 5 TABLET ORAL at 15:42

## 2025-06-18 RX ADMIN — DIVALPROEX SODIUM 1000 MG: 500 TABLET, DELAYED RELEASE ORAL at 21:14

## 2025-06-18 ASSESSMENT — PAIN SCALES - GENERAL
PAINLEVEL_OUTOF10: 0
PAINLEVEL_OUTOF10: 0

## 2025-06-18 NOTE — GROUP NOTE
Group Therapy Note    Date: 6/18/2025    Group Start Time: 1100  Group End Time: 1145  Group Topic: Relaxation    [facility] 3 ACUTE BEH[facility]    Lilly Hanley        Group Therapy Note    Attendees: 3       Patient's Goal:  To participate in relaxation activity    Notes:  Pt did not attend session  Discipline Responsible: Recreational Therapist      Signature:  LILLY HANLEY

## 2025-06-18 NOTE — GROUP NOTE
Group Therapy Note    Date: 6/18/2025    Group Start Time: 1500  Group End Time: 1600  Group Topic: Recreational    [facility] 3 ACUTE [facility]    Lilly Hanley        Group Therapy Note    Attendees: 2       Patient's Goal:  To concentrate on selected task    Notes:  Pt did not attend session    Discipline Responsible: Recreational Therapist      Signature:  LILLY HANLEY

## 2025-06-19 LAB
GLUCOSE BLD STRIP.AUTO-MCNC: 108 MG/DL (ref 65–117)
GLUCOSE BLD STRIP.AUTO-MCNC: 177 MG/DL (ref 65–117)
SERVICE CMNT-IMP: ABNORMAL
SERVICE CMNT-IMP: NORMAL
VALPROATE SERPL-MCNC: 87 UG/ML (ref 50–100)

## 2025-06-19 PROCEDURE — 1240000000 HC EMOTIONAL WELLNESS R&B

## 2025-06-19 PROCEDURE — 6370000000 HC RX 637 (ALT 250 FOR IP): Performed by: INTERNAL MEDICINE

## 2025-06-19 PROCEDURE — 80164 ASSAY DIPROPYLACETIC ACD TOT: CPT

## 2025-06-19 PROCEDURE — 82962 GLUCOSE BLOOD TEST: CPT

## 2025-06-19 PROCEDURE — 99232 SBSQ HOSP IP/OBS MODERATE 35: CPT | Performed by: PSYCHIATRY & NEUROLOGY

## 2025-06-19 PROCEDURE — 6370000000 HC RX 637 (ALT 250 FOR IP): Performed by: PSYCHIATRY & NEUROLOGY

## 2025-06-19 PROCEDURE — 36415 COLL VENOUS BLD VENIPUNCTURE: CPT

## 2025-06-19 RX ADMIN — NICOTINE POLACRILEX 2 MG: 2 LOZENGE ORAL at 20:25

## 2025-06-19 RX ADMIN — DIVALPROEX SODIUM 1000 MG: 500 TABLET, DELAYED RELEASE ORAL at 07:56

## 2025-06-19 RX ADMIN — DIVALPROEX SODIUM 1000 MG: 500 TABLET, DELAYED RELEASE ORAL at 20:25

## 2025-06-19 RX ADMIN — METFORMIN HYDROCHLORIDE 500 MG: 500 TABLET ORAL at 07:56

## 2025-06-19 RX ADMIN — HALOPERIDOL 5 MG: 5 TABLET ORAL at 20:25

## 2025-06-19 RX ADMIN — HALOPERIDOL 5 MG: 5 TABLET ORAL at 14:52

## 2025-06-19 RX ADMIN — NICOTINE POLACRILEX 2 MG: 2 LOZENGE ORAL at 14:52

## 2025-06-19 RX ADMIN — HYDROXYZINE HYDROCHLORIDE 50 MG: 25 TABLET ORAL at 14:51

## 2025-06-19 RX ADMIN — METFORMIN HYDROCHLORIDE 500 MG: 500 TABLET ORAL at 16:53

## 2025-06-19 RX ADMIN — Medication 10 MG: at 20:25

## 2025-06-19 RX ADMIN — LURASIDONE HYDROCHLORIDE 120 MG: 40 TABLET, FILM COATED ORAL at 16:53

## 2025-06-19 ASSESSMENT — PAIN SCALES - GENERAL
PAINLEVEL_OUTOF10: 0
PAINLEVEL_OUTOF10: 0

## 2025-06-19 NOTE — GROUP NOTE
Group Therapy Note    Date: 6/19/2025    Group Start Time: 1500  Group End Time: 1600  Group Topic: Recreational    [facility] AC[facility]    Lilly Hanley        Group Therapy Note    Attendees: 4       Patient's Goal:  To concentrate on selected task    Notes:  Pt did not attend session  Discipline Responsible: Recreational Therapist      Signature:  LILLY HANLEY

## 2025-06-19 NOTE — GROUP NOTE
Group Therapy Note    Date: 2025    Group Start Time: 830  Group End Time: 845  Group Topic: Community Meeting    [facility] 3 ACUTE BEHAV Vandana Naqvi        Group Therapy Note    Attendees: 14         Patient's Goal:  ***    Notes:  ***    Status After Intervention:  {Status After Intervention:434603736}    Participation Level: {Participation Level:365524833}    Participation Quality: {[facility] PARTICIPATION QUALITY:728844026}      Speech:  {[facility] CD_SPEECH:59475}      Thought Process/Content: {Thought Process/Content:126733869}      Affective Functioning: {Affective Functionin}      Mood: {Mood:382083249}      Level of consciousness:  {Level of consciousness:878687710}      Response to Learning: {[facility] Responses to Learnin}      Endings: {[facility] Endings:94703}    Modes of Intervention: {[facility] Modes of Intervention:171761167}      Discipline Responsible: {[facility] Multidisciplinary:817031622}      Signature:  VANDANA FERREIRA

## 2025-06-20 PROCEDURE — 1240000000 HC EMOTIONAL WELLNESS R&B

## 2025-06-20 PROCEDURE — 6370000000 HC RX 637 (ALT 250 FOR IP): Performed by: PSYCHIATRY & NEUROLOGY

## 2025-06-20 PROCEDURE — 99232 SBSQ HOSP IP/OBS MODERATE 35: CPT | Performed by: PSYCHIATRY & NEUROLOGY

## 2025-06-20 PROCEDURE — 6370000000 HC RX 637 (ALT 250 FOR IP): Performed by: INTERNAL MEDICINE

## 2025-06-20 RX ORDER — LURASIDONE HYDROCHLORIDE 40 MG/1
160 TABLET, FILM COATED ORAL
Status: DISCONTINUED | OUTPATIENT
Start: 2025-06-21 | End: 2025-06-24 | Stop reason: HOSPADM

## 2025-06-20 RX ADMIN — METFORMIN HYDROCHLORIDE 500 MG: 500 TABLET ORAL at 09:17

## 2025-06-20 RX ADMIN — NICOTINE POLACRILEX 2 MG: 2 LOZENGE ORAL at 16:35

## 2025-06-20 RX ADMIN — NICOTINE POLACRILEX 2 MG: 2 LOZENGE ORAL at 13:19

## 2025-06-20 RX ADMIN — LURASIDONE HYDROCHLORIDE 120 MG: 40 TABLET, FILM COATED ORAL at 16:35

## 2025-06-20 RX ADMIN — HYDROXYZINE HYDROCHLORIDE 50 MG: 25 TABLET ORAL at 20:47

## 2025-06-20 RX ADMIN — HALOPERIDOL 5 MG: 5 TABLET ORAL at 20:47

## 2025-06-20 RX ADMIN — METFORMIN HYDROCHLORIDE 500 MG: 500 TABLET ORAL at 16:35

## 2025-06-20 RX ADMIN — DIVALPROEX SODIUM 1000 MG: 500 TABLET, DELAYED RELEASE ORAL at 09:17

## 2025-06-20 RX ADMIN — HYDROXYZINE HYDROCHLORIDE 50 MG: 25 TABLET ORAL at 13:18

## 2025-06-20 RX ADMIN — NICOTINE POLACRILEX 2 MG: 2 LOZENGE ORAL at 09:17

## 2025-06-20 RX ADMIN — DIVALPROEX SODIUM 1000 MG: 500 TABLET, DELAYED RELEASE ORAL at 20:14

## 2025-06-20 RX ADMIN — Medication 10 MG: at 20:14

## 2025-06-20 RX ADMIN — HALOPERIDOL 5 MG: 5 TABLET ORAL at 13:18

## 2025-06-20 ASSESSMENT — PAIN SCALES - GENERAL: PAINLEVEL_OUTOF10: 0

## 2025-06-20 NOTE — GROUP NOTE
Group Therapy Note    Date: 6/20/2025    Group Start Time: 1500  Group End Time: 1600  Group Topic: Recreational    [facility]    Lilly Hanley        Group Therapy Note    Attendees: 2       Patient's Goal:  To concentrate on selected task    Notes:  Pt did not attend session  Discipline Responsible: Recreational Therapist      Signature:  LILLY HANLEY
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
## 2025-06-20 NOTE — GROUP NOTE
Group Therapy Note    Date: 6/20/2025    Group Start Time: 1045  Group End Time: 1130  Group Topic: Relaxation    [facility] 3 ACUTE BEH[facility]    Lilly Hanley        Group Therapy Note    Attendees: 4       Patient's Goal:  To participate in relaxation activity    Notes:  Pt did not attend session    Discipline Responsible: Recreational Therapist      Signature:  LILLY HANLEY

## 2025-06-20 NOTE — DISCHARGE INSTRUCTIONS
\"Walking for Exercise: Care Instructions.\"  Current as of: July 31, 2024  Content Version: 14.5  © 2024-2025 [employer].   Care instructions adapted under license by [employer]. If you have questions about a medical condition or this instruction, always ask your healthcare professional. [employer], disclaims any warranty or liability for your use of this information.        Stress and Coping: Mindfulness Can Help (02:29)  Your health professional recommends that you watch this short online health video.  Learn how two people practice mindfulness to help them cope with stress or a health problem.   Purpose: Learn how to use mindfulness to reduce stress or live with a chronic condition.  Goal: Learn how to use mindfulness to reduce stress or live with a chronic condition.    Watch: Scan the QR code or visit the link to view video       [email or web address]  Current as of: July 31, 2024  Content Version: 14.5  © 2006-2025 [employer].   Care instructions adapted under license by [employer]. If you have questions about a medical condition or this instruction, always ask your healthcare professional. [employer], disclaims any warranty or liability for your use of this information.        Tobacco: 5 Ways to Fit Quitting Into Your Social Life (01:58)  Your health professional recommends that you watch this short online health video.  Learn how to fit your new tobacco-free life into social settings.   Purpose: Recognize how to deal with social and identity challenges of quitting smoking.  Goal: Recognize how to deal with social and identity challenges of quitting smoking.    Watch: Scan the QR code or visit the link to view video       [email or web address]  Current as of: August 20, 2024  Content Version: 14.5  © 2006-2025 [employer].   Care instructions adapted under license by [employer]. If you have questions about a medical condition or

## 2025-06-21 PROCEDURE — 6370000000 HC RX 637 (ALT 250 FOR IP): Performed by: PSYCHIATRY & NEUROLOGY

## 2025-06-21 PROCEDURE — 6370000000 HC RX 637 (ALT 250 FOR IP): Performed by: INTERNAL MEDICINE

## 2025-06-21 PROCEDURE — 1240000000 HC EMOTIONAL WELLNESS R&B

## 2025-06-21 RX ADMIN — HYDROXYZINE HYDROCHLORIDE 50 MG: 25 TABLET ORAL at 16:15

## 2025-06-21 RX ADMIN — Medication 10 MG: at 20:50

## 2025-06-21 RX ADMIN — NICOTINE POLACRILEX 2 MG: 2 LOZENGE ORAL at 18:19

## 2025-06-21 RX ADMIN — METFORMIN HYDROCHLORIDE 500 MG: 500 TABLET ORAL at 09:19

## 2025-06-21 RX ADMIN — HALOPERIDOL 5 MG: 5 TABLET ORAL at 20:50

## 2025-06-21 RX ADMIN — NICOTINE POLACRILEX 2 MG: 2 LOZENGE ORAL at 21:13

## 2025-06-21 RX ADMIN — DIVALPROEX SODIUM 1000 MG: 500 TABLET, DELAYED RELEASE ORAL at 09:19

## 2025-06-21 RX ADMIN — NICOTINE POLACRILEX 2 MG: 2 LOZENGE ORAL at 09:20

## 2025-06-21 RX ADMIN — LURASIDONE HYDROCHLORIDE 160 MG: 40 TABLET, FILM COATED ORAL at 17:25

## 2025-06-21 RX ADMIN — HALOPERIDOL 5 MG: 5 TABLET ORAL at 16:15

## 2025-06-21 RX ADMIN — METFORMIN HYDROCHLORIDE 500 MG: 500 TABLET ORAL at 17:25

## 2025-06-21 RX ADMIN — DIVALPROEX SODIUM 1000 MG: 500 TABLET, DELAYED RELEASE ORAL at 20:50

## 2025-06-22 LAB
GLUCOSE BLD STRIP.AUTO-MCNC: 224 MG/DL (ref 65–117)
SERVICE CMNT-IMP: ABNORMAL

## 2025-06-22 PROCEDURE — 1240000000 HC EMOTIONAL WELLNESS R&B

## 2025-06-22 PROCEDURE — 6370000000 HC RX 637 (ALT 250 FOR IP): Performed by: PSYCHIATRY & NEUROLOGY

## 2025-06-22 PROCEDURE — 6370000000 HC RX 637 (ALT 250 FOR IP): Performed by: INTERNAL MEDICINE

## 2025-06-22 PROCEDURE — 82962 GLUCOSE BLOOD TEST: CPT

## 2025-06-22 RX ADMIN — HYDROXYZINE HYDROCHLORIDE 50 MG: 25 TABLET ORAL at 16:26

## 2025-06-22 RX ADMIN — NICOTINE POLACRILEX 2 MG: 2 LOZENGE ORAL at 09:31

## 2025-06-22 RX ADMIN — METFORMIN HYDROCHLORIDE 500 MG: 500 TABLET ORAL at 16:26

## 2025-06-22 RX ADMIN — METFORMIN HYDROCHLORIDE 500 MG: 500 TABLET ORAL at 09:31

## 2025-06-22 RX ADMIN — NICOTINE POLACRILEX 2 MG: 2 LOZENGE ORAL at 20:36

## 2025-06-22 RX ADMIN — LURASIDONE HYDROCHLORIDE 160 MG: 40 TABLET, FILM COATED ORAL at 16:25

## 2025-06-22 RX ADMIN — HALOPERIDOL 5 MG: 5 TABLET ORAL at 20:35

## 2025-06-22 RX ADMIN — NICOTINE POLACRILEX 2 MG: 2 LOZENGE ORAL at 16:26

## 2025-06-22 RX ADMIN — DIVALPROEX SODIUM 1000 MG: 500 TABLET, DELAYED RELEASE ORAL at 20:35

## 2025-06-22 RX ADMIN — DIVALPROEX SODIUM 1000 MG: 500 TABLET, DELAYED RELEASE ORAL at 09:31

## 2025-06-22 RX ADMIN — Medication 10 MG: at 20:36

## 2025-06-23 LAB
GLUCOSE BLD STRIP.AUTO-MCNC: 118 MG/DL (ref 65–117)
SERVICE CMNT-IMP: ABNORMAL

## 2025-06-23 PROCEDURE — 6370000000 HC RX 637 (ALT 250 FOR IP): Performed by: PSYCHIATRY & NEUROLOGY

## 2025-06-23 PROCEDURE — 99232 SBSQ HOSP IP/OBS MODERATE 35: CPT | Performed by: PSYCHIATRY & NEUROLOGY

## 2025-06-23 PROCEDURE — 1240000000 HC EMOTIONAL WELLNESS R&B

## 2025-06-23 PROCEDURE — 82962 GLUCOSE BLOOD TEST: CPT

## 2025-06-23 PROCEDURE — 6370000000 HC RX 637 (ALT 250 FOR IP): Performed by: INTERNAL MEDICINE

## 2025-06-23 RX ORDER — DIAZEPAM 5 MG/1
5 TABLET ORAL EVERY 6 HOURS PRN
Status: DISCONTINUED | OUTPATIENT
Start: 2025-06-23 | End: 2025-06-23

## 2025-06-23 RX ORDER — DIAZEPAM 5 MG/1
5 TABLET ORAL EVERY 6 HOURS PRN
Status: DISCONTINUED | OUTPATIENT
Start: 2025-06-23 | End: 2025-06-24 | Stop reason: HOSPADM

## 2025-06-23 RX ADMIN — DIAZEPAM 5 MG: 5 TABLET ORAL at 16:57

## 2025-06-23 RX ADMIN — NICOTINE POLACRILEX 2 MG: 2 LOZENGE ORAL at 15:27

## 2025-06-23 RX ADMIN — HALOPERIDOL 5 MG: 5 TABLET ORAL at 15:27

## 2025-06-23 RX ADMIN — METFORMIN HYDROCHLORIDE 500 MG: 500 TABLET ORAL at 16:51

## 2025-06-23 RX ADMIN — DIVALPROEX SODIUM 1000 MG: 500 TABLET, DELAYED RELEASE ORAL at 21:19

## 2025-06-23 RX ADMIN — NICOTINE POLACRILEX 2 MG: 2 LOZENGE ORAL at 21:20

## 2025-06-23 RX ADMIN — HYDROXYZINE HYDROCHLORIDE 50 MG: 25 TABLET ORAL at 15:27

## 2025-06-23 RX ADMIN — LURASIDONE HYDROCHLORIDE 160 MG: 40 TABLET, FILM COATED ORAL at 16:51

## 2025-06-23 RX ADMIN — METFORMIN HYDROCHLORIDE 500 MG: 500 TABLET ORAL at 09:10

## 2025-06-23 RX ADMIN — DIVALPROEX SODIUM 1000 MG: 500 TABLET, DELAYED RELEASE ORAL at 09:10

## 2025-06-23 RX ADMIN — Medication 10 MG: at 21:20

## 2025-06-23 ASSESSMENT — PAIN - FUNCTIONAL ASSESSMENT: PAIN_FUNCTIONAL_ASSESSMENT: NONE - DENIES PAIN

## 2025-06-23 NOTE — GROUP NOTE
Group Therapy Note    Date: 6/23/2025    Group Start Time: 1045  Group End Time: 1130  Group Topic: Relaxation    [facility]    Lilly Hanley        Group Therapy Note    Attendees: 4       Patient's Goal:  To participate in relaxation activity    Notes:  Pt did not attend session  Discipline Responsible: Recreational Therapist      Signature:  LILLY HANLEY

## 2025-06-23 NOTE — GROUP NOTE
Group Therapy Note    Date: 6/23/2025    Group Start Time: 1500  Group End Time: 1600  Group Topic: Recreational    [facility]    Lilly Hanley        Group Therapy Note    Attendees: 4       Patient's Goal:  To concentrate on selected task    Notes:  Pt did not attend session  Discipline Responsible: Recreational Therapist      Signature:  LILLY HANLEY

## 2025-06-24 VITALS
TEMPERATURE: 97.7 F | BODY MASS INDEX: 45.99 KG/M2 | RESPIRATION RATE: 19 BRPM | HEART RATE: 94 BPM | SYSTOLIC BLOOD PRESSURE: 142 MMHG | OXYGEN SATURATION: 97 % | WEIGHT: 293 LBS | DIASTOLIC BLOOD PRESSURE: 87 MMHG | HEIGHT: 67 IN

## 2025-06-24 PROCEDURE — 6370000000 HC RX 637 (ALT 250 FOR IP): Performed by: PSYCHIATRY & NEUROLOGY

## 2025-06-24 PROCEDURE — 6370000000 HC RX 637 (ALT 250 FOR IP): Performed by: INTERNAL MEDICINE

## 2025-06-24 RX ORDER — DIAZEPAM 5 MG/1
5 TABLET ORAL 2 TIMES DAILY PRN
Qty: 60 TABLET | Refills: 1 | Status: SHIPPED | OUTPATIENT
Start: 2025-06-24 | End: 2025-08-23

## 2025-06-24 RX ORDER — MECOBALAMIN 5000 MCG
10 TABLET,DISINTEGRATING ORAL NIGHTLY
Qty: 60 TABLET | Refills: 1 | Status: SHIPPED | OUTPATIENT
Start: 2025-06-24

## 2025-06-24 RX ORDER — HYDROXYZINE HYDROCHLORIDE 50 MG/1
50 TABLET, FILM COATED ORAL 2 TIMES DAILY PRN
Qty: 60 TABLET | Refills: 1 | Status: SHIPPED | OUTPATIENT
Start: 2025-06-24 | End: 2025-08-23

## 2025-06-24 RX ORDER — LURASIDONE HYDROCHLORIDE 80 MG/1
160 TABLET, FILM COATED ORAL
Qty: 60 TABLET | Refills: 1 | Status: SHIPPED | OUTPATIENT
Start: 2025-06-24

## 2025-06-24 RX ORDER — DIVALPROEX SODIUM 500 MG/1
1000 TABLET, DELAYED RELEASE ORAL 2 TIMES DAILY
Qty: 120 TABLET | Refills: 1 | Status: SHIPPED | OUTPATIENT
Start: 2025-06-24

## 2025-06-24 RX ADMIN — METFORMIN HYDROCHLORIDE 500 MG: 500 TABLET ORAL at 08:17

## 2025-06-24 RX ADMIN — DIAZEPAM 5 MG: 5 TABLET ORAL at 12:14

## 2025-06-24 RX ADMIN — DIVALPROEX SODIUM 1000 MG: 500 TABLET, DELAYED RELEASE ORAL at 08:17

## 2025-06-24 RX ADMIN — NICOTINE POLACRILEX 2 MG: 2 LOZENGE ORAL at 10:33

## 2025-06-24 RX ADMIN — HYDROXYZINE HYDROCHLORIDE 50 MG: 25 TABLET ORAL at 10:32

## 2025-06-24 ASSESSMENT — PAIN SCALES - GENERAL: PAINLEVEL_OUTOF10: 0

## 2025-06-24 NOTE — PLAN OF CARE
Problem: Anxiety  Goal: Will report anxiety at manageable levels  Description: INTERVENTIONS:1. Administer medication as ordered2. Teach and rehearse alternative coping skills3. Provide emotional support with 1:1 interaction with staff  6/15/2025 2255 by Agueda Ramos, RN  Outcome: Not Progressing     Problem: Depression  Goal: Will be euthymic at discharge  Description: INTERVENTIONS:1. Administer medication as ordered2. Provide emotional support via 1:1 interaction with staff3. Encourage involvement in milieu/groups/activities4. Monitor for social isolation  Outcome: Progressing     Problem: Psychosis  Goal: Will report no hallucinations or delusions  Description: INTERVENTIONS:1. Administer medication as  ordered2. Assist with reality testing to support increasing orientation3. Assess if patient's hallucinations or delusions are encouraging self harm or harm to others and intervene as appropriate  6/15/2025 2255 by Agueda Ramos, RN  Outcome: Progressing     Problem: Safety - Adult  Goal: Free from fall injury  Outcome: Progressing     
  Problem: Anxiety  Goal: Will report anxiety at manageable levels  Description: INTERVENTIONS:1. Administer medication as ordered2. Teach and rehearse alternative coping skills3. Provide emotional support with 1:1 interaction with staff  6/19/2025 0052 by Agueda Ramos, RN  Outcome: Not Progressing     
  Problem: Anxiety  Goal: Will report anxiety at manageable levels  Description: INTERVENTIONS:1. Administer medication as ordered2. Teach and rehearse alternative coping skills3. Provide emotional support with 1:1 interaction with staff  6/22/2025 1003 by Katy Mar, RN  Outcome: Not Progressing     
  Problem: Anxiety  Goal: Will report anxiety at manageable levels  Description: INTERVENTIONS:1. Administer medication as ordered2. Teach and rehearse alternative coping skills3. Provide emotional support with 1:1 interaction with staff  Outcome: Not Progressing     
  Problem: Discharge Planning  Goal: Discharge to home or other facility with appropriate resources  6/24/2025 0931 by Jalyn Gerardo  Outcome: Adequate for Discharge  6/24/2025 0928 by Jalyn Gerardo  Outcome: Adequate for Discharge  6/23/2025 2345 by Gricel Reyes, RN  Outcome: Not Progressing     Problem: Risk for Elopement  Goal: Patient will not exit the unit/facility without proper excort  6/24/2025 0931 by Jalyn Gerardo  Outcome: Adequate for Discharge  6/24/2025 0928 by Jalyn Gerardo  Outcome: Adequate for Discharge  6/23/2025 2345 by Gricel Reyes RN  Outcome: Progressing     Problem: Self Harm/Suicidality  Goal: Will have no self-injury during hospital stay  Description: INTERVENTIONS:1.  Ensure constant observer at bedside with Q15M safety checks2.  Maintain a safe environment3.  Secure patient belongings4.  Ensure family/visitors adhere to safety recommendations5.  Ensure safety tray has been added to patient's diet order6.  Every shift and PRN: Re-assess suicidal risk via Frequent Screener  6/24/2025 0931 by Jalyn Gerardo  Outcome: Adequate for Discharge  6/24/2025 0928 by Jalyn Gerardo  Outcome: Adequate for Discharge     Problem: Depression  Goal: Will be euthymic at discharge  Description: INTERVENTIONS:1. Administer medication as ordered2. Provide emotional support via 1:1 interaction with staff3. Encourage involvement in milieu/groups/activities4. Monitor for social isolation  6/24/2025 0931 by Jalyn Gerardo  Outcome: Adequate for Discharge  6/24/2025 0928 by Jalyn Gerardo  Outcome: Adequate for Discharge     Problem: Faye  Goal: Will exhibit normal sleep and speech and no impulsivity  Description: INTERVENTIONS:1. Administer medication as ordered2. Set limits on impulsive behavior3. Make attempts to decrease external stimuli as possible  6/24/2025 0931 by Jalyn Gerardo  Outcome: Adequate for Discharge  6/24/2025 0928 by Jalyn Gerardo  Outcome: Adequate for Discharge     Problem: Psychosis  Goal: Will 
  Problem: Discharge Planning  Goal: Discharge to home or other facility with appropriate resources  6/24/2025 0931 by Jalyn Gerardo  Outcome: Adequate for Discharge  6/24/2025 0928 by Jalyn Gerardo  Outcome: Adequate for Discharge  6/23/2025 2345 by Gricel Reyes, RN  Outcome: Not Progressing     Problem: Risk for Elopement  Goal: Patient will not exit the unit/facility without proper excort  6/24/2025 0931 by Jalyn Gerardo  Outcome: Adequate for Discharge  6/24/2025 0928 by Jalyn Gerardo  Outcome: Adequate for Discharge  6/23/2025 2345 by Gricel Reyes RN  Outcome: Progressing     Problem: Self Harm/Suicidality  Goal: Will have no self-injury during hospital stay  Description: INTERVENTIONS:1.  Ensure constant observer at bedside with Q15M safety checks2.  Maintain a safe environment3.  Secure patient belongings4.  Ensure family/visitors adhere to safety recommendations5.  Ensure safety tray has been added to patient's diet order6.  Every shift and PRN: Re-assess suicidal risk via Frequent Screener  6/24/2025 0931 by Jalyn Gerardo  Outcome: Adequate for Discharge  6/24/2025 0928 by Jalyn Gerardo  Outcome: Adequate for Discharge     Problem: Depression  Goal: Will be euthymic at discharge  Description: INTERVENTIONS:1. Administer medication as ordered2. Provide emotional support via 1:1 interaction with staff3. Encourage involvement in milieu/groups/activities4. Monitor for social isolation  6/24/2025 0931 by Jalyn Gerardo  Outcome: Adequate for Discharge  6/24/2025 0928 by Jalyn Gerardo  Outcome: Adequate for Discharge     Problem: Faye  Goal: Will exhibit normal sleep and speech and no impulsivity  Description: INTERVENTIONS:1. Administer medication as ordered2. Set limits on impulsive behavior3. Make attempts to decrease external stimuli as possible  6/24/2025 0931 by Jalyn Gerardo  Outcome: Adequate for Discharge  6/24/2025 0928 by Jalyn Gerardo  Outcome: Adequate for Discharge     Problem: Psychosis  Goal: Will 
  Problem: Discharge Planning  Goal: Discharge to home or other facility with appropriate resources  6/24/2025 0931 by Jalyn Gerardo  Outcome: Adequate for Discharge  6/24/2025 0928 by Jalyn Gerardo  Outcome: Adequate for Discharge  6/23/2025 2345 by rGicel Reyes, RN  Outcome: Not Progressing     Problem: Risk for Elopement  Goal: Patient will not exit the unit/facility without proper excort  6/24/2025 0931 by Jalyn Gerardo  Outcome: Adequate for Discharge  6/24/2025 0928 by Jalyn Gerardo  Outcome: Adequate for Discharge  6/23/2025 2345 by Gricel Reyes RN  Outcome: Progressing     Problem: Self Harm/Suicidality  Goal: Will have no self-injury during hospital stay  Description: INTERVENTIONS:1.  Ensure constant observer at bedside with Q15M safety checks2.  Maintain a safe environment3.  Secure patient belongings4.  Ensure family/visitors adhere to safety recommendations5.  Ensure safety tray has been added to patient's diet order6.  Every shift and PRN: Re-assess suicidal risk via Frequent Screener  6/24/2025 0931 by Jalyn Gerardo  Outcome: Adequate for Discharge  6/24/2025 0928 by Jalyn Gerardo  Outcome: Adequate for Discharge     Problem: Depression  Goal: Will be euthymic at discharge  Description: INTERVENTIONS:1. Administer medication as ordered2. Provide emotional support via 1:1 interaction with staff3. Encourage involvement in milieu/groups/activities4. Monitor for social isolation  6/24/2025 0931 by Jalyn Gerardo  Outcome: Adequate for Discharge  6/24/2025 0928 by Jalyn Gerardo  Outcome: Adequate for Discharge     Problem: Faye  Goal: Will exhibit normal sleep and speech and no impulsivity  Description: INTERVENTIONS:1. Administer medication as ordered2. Set limits on impulsive behavior3. Make attempts to decrease external stimuli as possible  6/24/2025 0931 by Jalyn Gerardo  Outcome: Adequate for Discharge  6/24/2025 0928 by Jalyn Gerardo  Outcome: Adequate for Discharge     Problem: Psychosis  Goal: Will 
  Problem: Discharge Planning  Goal: Discharge to home or other facility with appropriate resources  Outcome: Progressing     Problem: Psychosis  Goal: Will report no hallucinations or delusions  Description: INTERVENTIONS:1. Administer medication as  ordered2. Assist with reality testing to support increasing orientation3. Assess if patient's hallucinations or delusions are encouraging self harm or harm to others and intervene as appropriate  Outcome: Progressing     
  Problem: Psychosis  Goal: Will report no hallucinations or delusions  Description: INTERVENTIONS:1. Administer medication as  ordered2. Assist with reality testing to support increasing orientation3. Assess if patient's hallucinations or delusions are encouraging self harm or harm to others and intervene as appropriate  6/11/2025 2106 by Ping Kumar, RN  Outcome: Not Progressing     
  Problem: Psychosis  Goal: Will report no hallucinations or delusions  Description: INTERVENTIONS:1. Administer medication as  ordered2. Assist with reality testing to support increasing orientation3. Assess if patient's hallucinations or delusions are encouraging self harm or harm to others and intervene as appropriate  6/13/2025 2234 by Alexa Stokes, RN  Outcome: Progressing  6/13/2025 1307 by Lisseth Subramanian, RN  Outcome: Not Progressing     
  Problem: Psychosis  Goal: Will report no hallucinations or delusions  Description: INTERVENTIONS:1. Administer medication as  ordered2. Assist with reality testing to support increasing orientation3. Assess if patient's hallucinations or delusions are encouraging self harm or harm to others and intervene as appropriate  6/20/2025 1503 by Darcie Carrero, RN  Outcome: Not Progressing     Problem: Anxiety  Goal: Will report anxiety at manageable levels  Description: INTERVENTIONS:1. Administer medication as ordered2. Teach and rehearse alternative coping skills3. Provide emotional support with 1:1 interaction with staff  6/20/2025 1503 by Darcie Carrero, RN  Outcome: Not Progressing     
  Problem: Psychosis  Goal: Will report no hallucinations or delusions  Description: INTERVENTIONS:1. Administer medication as  ordered2. Assist with reality testing to support increasing orientation3. Assess if patient's hallucinations or delusions are encouraging self harm or harm to others and intervene as appropriate  6/21/2025 1852 by Darcie Carrero RN  Outcome: Not Progressing  6/21/2025 1851 by Darcie Carrero RN  Outcome: Not Progressing     Problem: Behavior  Goal: Pt/Family maintain appropriate behavior and adhere to behavioral management agreement, if implemented  Description: INTERVENTIONS:1. Assess patient/family's coping skills and  non-compliant behavior (including use of illegal substances)2. Notify security of behavior or suspected illegal substances which indicate the need for search of the family and/or belongings3. Encourage verbalization of thoughts and concerns in a socially appropriate manner4. Utilize positive, consistent limit setting strategies supporting safety of patient, staff and others5. Encourage participation in the decision making process about the behavioral management agreement6. If a visitor's behavior poses a threat to safety call refer to organization policy.7. Initiate consult with , Psychosocial CNS, Spiritual Care as appropriate  6/21/2025 2318 by Alexa Stokes RN  Outcome: Progressing  6/21/2025 1852 by Darcie Carrero RN  Outcome: Not Progressing     Problem: Anxiety  Goal: Will report anxiety at manageable levels  Description: INTERVENTIONS:1. Administer medication as ordered2. Teach and rehearse alternative coping skills3. Provide emotional support with 1:1 interaction with staff  6/21/2025 1852 by Darcie Carrero RN  Outcome: Not Progressing  6/21/2025 1851 by Darcie Carrero RN  Outcome: Not Progressing     
  Problem: Psychosis  Goal: Will report no hallucinations or delusions  Description: INTERVENTIONS:1. Administer medication as  ordered2. Assist with reality testing to support increasing orientation3. Assess if patient's hallucinations or delusions are encouraging self harm or harm to others and intervene as appropriate  6/21/2025 1852 by Darcie Carrero RN  Outcome: Not Progressing  6/21/2025 1851 by Darcie Carrero RN  Outcome: Not Progressing     Problem: Behavior  Goal: Pt/Family maintain appropriate behavior and adhere to behavioral management agreement, if implemented  Description: INTERVENTIONS:1. Assess patient/family's coping skills and  non-compliant behavior (including use of illegal substances)2. Notify security of behavior or suspected illegal substances which indicate the need for search of the family and/or belongings3. Encourage verbalization of thoughts and concerns in a socially appropriate manner4. Utilize positive, consistent limit setting strategies supporting safety of patient, staff and others5. Encourage participation in the decision making process about the behavioral management agreement6. If a visitor's behavior poses a threat to safety call refer to organization policy.7. Initiate consult with , Psychosocial CNS, Spiritual Care as appropriate  Outcome: Not Progressing     Problem: Anxiety  Goal: Will report anxiety at manageable levels  Description: INTERVENTIONS:1. Administer medication as ordered2. Teach and rehearse alternative coping skills3. Provide emotional support with 1:1 interaction with staff  6/21/2025 1852 by Darcie Carrero RN  Outcome: Not Progressing  6/21/2025 1851 by Darcie Carrero, RN  Outcome: Not Progressing     
  Problem: Psychosis  Goal: Will report no hallucinations or delusions  Description: INTERVENTIONS:1. Administer medication as  ordered2. Assist with reality testing to support increasing orientation3. Assess if patient's hallucinations or delusions are encouraging self harm or harm to others and intervene as appropriate  6/9/2025 0550 by Agueda Ramos, RN  Outcome: Not Progressing     Problem: Anxiety  Goal: Will report anxiety at manageable levels  Description: INTERVENTIONS:1. Administer medication as ordered2. Teach and rehearse alternative coping skills3. Provide emotional support with 1:1 interaction with staff  6/9/2025 1663 by Agueda Ramos, RN  Outcome: Not Progressing     
  Problem: Psychosis  Goal: Will report no hallucinations or delusions  Description: INTERVENTIONS:1. Administer medication as  ordered2. Assist with reality testing to support increasing orientation3. Assess if patient's hallucinations or delusions are encouraging self harm or harm to others and intervene as appropriate  Outcome: Not Progressing          
  Problem: Psychosis  Goal: Will report no hallucinations or delusions  Description: INTERVENTIONS:1. Administer medication as  ordered2. Assist with reality testing to support increasing orientation3. Assess if patient's hallucinations or delusions are encouraging self harm or harm to others and intervene as appropriate  Outcome: Not Progressing     Problem: Anxiety  Goal: Will report anxiety at manageable levels  Description: INTERVENTIONS:1. Administer medication as ordered2. Teach and rehearse alternative coping skills3. Provide emotional support with 1:1 interaction with staff  Outcome: Not Progressing     
  Problem: Psychosis  Goal: Will report no hallucinations or delusions  Description: INTERVENTIONS:1. Administer medication as  ordered2. Assist with reality testing to support increasing orientation3. Assess if patient's hallucinations or delusions are encouraging self harm or harm to others and intervene as appropriate  Outcome: Not Progressing     Problem: Anxiety  Goal: Will report anxiety at manageable levels  Description: INTERVENTIONS:1. Administer medication as ordered2. Teach and rehearse alternative coping skills3. Provide emotional support with 1:1 interaction with staff  Outcome: Not Progressing     
  Problem: Risk for Elopement  Goal: Patient will not exit the unit/facility without proper excort  6/10/2025 2215 by Ping Kumar RN  Outcome: Progressing  6/10/2025 0920 by Betty Gerber RN  Outcome: Progressing     Problem: Self Harm/Suicidality  Goal: Will have no self-injury during hospital stay  Description: INTERVENTIONS:1.  Ensure constant observer at bedside with Q15M safety checks2.  Maintain a safe environment3.  Secure patient belongings4.  Ensure family/visitors adhere to safety recommendations5.  Ensure safety tray has been added to patient's diet order6.  Every shift and PRN: Re-assess suicidal risk via Frequent Screener  6/10/2025 2215 by Ping Kumar RN  Outcome: Progressing  6/10/2025 0920 by Betty Gerber RN  Outcome: Progressing     Problem: Faye  Goal: Will exhibit normal sleep and speech and no impulsivity  Description: INTERVENTIONS:1. Administer medication as ordered2. Set limits on impulsive behavior3. Make attempts to decrease external stimuli as possible  6/10/2025 2215 by Ping Kumar RN  Outcome: Progressing  6/10/2025 0920 by Betty Gerber RN  Outcome: Progressing     Problem: Discharge Planning  Goal: Discharge to home or other facility with appropriate resources  Outcome: Not Progressing     Problem: Psychosis  Goal: Will report no hallucinations or delusions  Description: INTERVENTIONS:1. Administer medication as  ordered2. Assist with reality testing to support increasing orientation3. Assess if patient's hallucinations or delusions are encouraging self harm or harm to others and intervene as appropriate  Outcome: Not Progressing     Problem: Anxiety  Goal: Will report anxiety at manageable levels  Description: INTERVENTIONS:1. Administer medication as ordered2. Teach and rehearse alternative coping skills3. Provide emotional support with 1:1 interaction with staff  Outcome: Not Progressing     
  Problem: Risk for Elopement  Goal: Patient will not exit the unit/facility without proper excort  6/11/2025 2106 by Ping Kumar RN  Outcome: Progressing  6/11/2025 1712 by Lisa Coelho RN  Outcome: Progressing     Problem: Self Harm/Suicidality  Goal: Will have no self-injury during hospital stay  Description: INTERVENTIONS:1.  Ensure constant observer at bedside with Q15M safety checks2.  Maintain a safe environment3.  Secure patient belongings4.  Ensure family/visitors adhere to safety recommendations5.  Ensure safety tray has been added to patient's diet order6.  Every shift and PRN: Re-assess suicidal risk via Frequent Screener  6/11/2025 2106 by Ping Kumar RN  Outcome: Progressing  6/11/2025 0743 by Chaparrita Ambriz RN  Outcome: Progressing     Problem: Faye  Goal: Will exhibit normal sleep and speech and no impulsivity  Description: INTERVENTIONS:1. Administer medication as ordered2. Set limits on impulsive behavior3. Make attempts to decrease external stimuli as possible  6/11/2025 2106 by Ping Kumar RN  Outcome: Progressing  6/11/2025 1712 by Lisa Coelho RN  Outcome: Progressing     Problem: Anxiety  Goal: Will report anxiety at manageable levels  Description: INTERVENTIONS:1. Administer medication as ordered2. Teach and rehearse alternative coping skills3. Provide emotional support with 1:1 interaction with staff  6/11/2025 2106 by Ping Kumar RN  Outcome: Progressing  6/11/2025 0743 by Chaparrita Ambriz RN  Outcome: Progressing     Problem: Discharge Planning  Goal: Discharge to home or other facility with appropriate resources  Outcome: Not Progressing     Problem: Psychosis  Goal: Will report no hallucinations or delusions  Description: INTERVENTIONS:1. Administer medication as  ordered2. Assist with reality testing to support increasing orientation3. Assess if patient's hallucinations or delusions are encouraging self harm or harm to others and 
  Problem: Risk for Elopement  Goal: Patient will not exit the unit/facility without proper excort  6/12/2025 2155 by Ping Kumar RN  Outcome: Progressing     Problem: Self Harm/Suicidality  Goal: Will have no self-injury during hospital stay  Description: INTERVENTIONS:1.  Ensure Q15M safety checks2.  Maintain a safe environment3.  Secure patient belongings4.  Ensure family/visitors adhere to safety recommendations5.  Every shift and PRN: Re-assess suicidal risk via Frequent Screener  6/12/2025 2155 by Ping Kumar RN  Outcome: Progressing     Problem: Faye  Goal: Will exhibit normal sleep and speech and no impulsivity  Description: INTERVENTIONS:1. Administer medication as ordered2. Set limits on impulsive behavior3. Make attempts to decrease external stimuli as possible  6/12/2025 2155 by Ping Kumar, RN  Outcome: Progressing     Problem: Anxiety  Goal: Will report anxiety at manageable levels  Description: INTERVENTIONS:1. Administer medication as ordered2. Teach and rehearse alternative coping skills3. Provide emotional support with 1:1 interaction with staff  6/12/2025 2155 by Ping Kumar, RN  Outcome: Progressing        
  Problem: Risk for Elopement  Goal: Patient will not exit the unit/facility without proper excort  6/18/2025 1136 by Betty Gerber, RN  Outcome: Progressing     Problem: Self Harm/Suicidality  Goal: Will have no self-injury during hospital stay  Description: INTERVENTIONS:1.  Ensure constant observer at bedside with Q15M safety checks2.  Maintain a safe environment3.  Secure patient belongings4.  Ensure family/visitors adhere to safety recommendations5.  Ensure safety tray has been added to patient's diet order6.  Every shift and PRN: Re-assess suicidal risk via Frequent Screener  6/19/2025 0052 by Agueda Ramos, RN  Outcome: Progressing     Problem: Depression  Goal: Will be euthymic at discharge  Description: INTERVENTIONS:1. Administer medication as ordered2. Provide emotional support via 1:1 interaction with staff3. Encourage involvement in milieu/groups/activities4. Monitor for social isolation  Outcome: Progressing     Problem: Safety - Adult  Goal: Free from fall injury  Outcome: Progressing     
  Problem: Risk for Elopement  Goal: Patient will not exit the unit/facility without proper excort  Outcome: Progressing     
  Problem: Risk for Elopement  Goal: Patient will not exit the unit/facility without proper excort  Outcome: Progressing     
  Problem: Risk for Elopement  Goal: Patient will not exit the unit/facility without proper excort  Outcome: Progressing     Problem: Depression  Goal: Will be euthymic at discharge  Description: INTERVENTIONS:1. Administer medication as ordered2. Provide emotional support via 1:1 interaction with staff3. Encourage involvement in milieu/groups/activities4. Monitor for social isolation  Outcome: Progressing     Problem: Psychosis  Goal: Will report no hallucinations or delusions  Description: INTERVENTIONS:1. Administer medication as  ordered2. Assist with reality testing to support increasing orientation3. Assess if patient's hallucinations or delusions are encouraging self harm or harm to others and intervene as appropriate  Outcome: Progressing     Problem: Anxiety  Goal: Will report anxiety at manageable levels  Description: INTERVENTIONS:1. Administer medication as ordered2. Teach and rehearse alternative coping skills3. Provide emotional support with 1:1 interaction with staff  Outcome: Progressing     Problem: Self Care Deficit  Goal: Return ADL status to a safe level of function  Description: INTERVENTIONS:1. Administer medication as ordered2. Assess ADL deficits and provide assistive devices as needed3. Obtain PT/OT consults as needed4. Assist and instruct patient to increase activity and self care as tolerated  Outcome: Not Progressing     
  Problem: Risk for Elopement  Goal: Patient will not exit the unit/facility without proper excort  Outcome: Progressing     Problem: Faye  Goal: Will exhibit normal sleep and speech and no impulsivity  Description: INTERVENTIONS:1. Administer medication as ordered2. Set limits on impulsive behavior3. Make attempts to decrease external stimuli as possible  Outcome: Progressing     
  Problem: Risk for Elopement  Goal: Patient will not exit the unit/facility without proper excort  Outcome: Progressing     Problem: Self Harm/Suicidality  Goal: Will have no self-injury during hospital stay  Description: INTERVENTIONS:1.  Ensure constant observer at bedside with Q15M safety checks2.  Maintain a safe environment3.  Secure patient belongings4.  Ensure family/visitors adhere to safety recommendations5.  Ensure safety tray has been added to patient's diet order6.  Every shift and PRN: Re-assess suicidal risk via Frequent Screener  6/11/2025 0743 by Chaparrita Ambriz, RN  Outcome: Progressing     Problem: Faye  Goal: Will exhibit normal sleep and speech and no impulsivity  Description: INTERVENTIONS:1. Administer medication as ordered2. Set limits on impulsive behavior3. Make attempts to decrease external stimuli as possible  Outcome: Progressing     Problem: Psychosis  Goal: Will report no hallucinations or delusions  Description: INTERVENTIONS:1. Administer medication as  ordered2. Assist with reality testing to support increasing orientation3. Assess if patient's hallucinations or delusions are encouraging self harm or harm to others and intervene as appropriate  Outcome: Progressing     Problem: Anxiety  Goal: Will report anxiety at manageable levels  Description: INTERVENTIONS:1. Administer medication as ordered2. Teach and rehearse alternative coping skills3. Provide emotional support with 1:1 interaction with staff  6/11/2025 0743 by Chaparrita Ambriz, RN  Outcome: Progressing     
  Problem: Risk for Elopement  Goal: Patient will not exit the unit/facility without proper excort  Outcome: Progressing     Problem: Self Harm/Suicidality  Goal: Will have no self-injury during hospital stay  Description: INTERVENTIONS:1.  Ensure constant observer at bedside with Q15M safety checks2.  Maintain a safe environment3.  Secure patient belongings4.  Ensure family/visitors adhere to safety recommendations5.  Ensure safety tray has been added to patient's diet order6.  Every shift and PRN: Re-assess suicidal risk via Frequent Screener  6/17/2025 1044 by Ricky Urias, RN  Outcome: Progressing     
  Problem: Risk for Elopement  Goal: Patient will not exit the unit/facility without proper excort  Outcome: Progressing     Problem: Self Harm/Suicidality  Goal: Will have no self-injury during hospital stay  Description: INTERVENTIONS:1.  Ensure constant observer at bedside with Q15M safety checks2.  Maintain a safe environment3.  Secure patient belongings4.  Ensure family/visitors adhere to safety recommendations5.  Ensure safety tray has been added to patient's diet order6.  Every shift and PRN: Re-assess suicidal risk via Frequent Screener  Outcome: Progressing     Problem: Faye  Goal: Will exhibit normal sleep and speech and no impulsivity  Description: INTERVENTIONS:1. Administer medication as ordered2. Set limits on impulsive behavior3. Make attempts to decrease external stimuli as possible  Outcome: Progressing     Problem: Anxiety  Goal: Will report anxiety at manageable levels  Description: INTERVENTIONS:1. Administer medication as ordered2. Teach and rehearse alternative coping skills3. Provide emotional support with 1:1 interaction with staff  Outcome: Progressing     Problem: Discharge Planning  Goal: Discharge to home or other facility with appropriate resources  Outcome: Not Progressing     Problem: Psychosis  Goal: Will report no hallucinations or delusions  Description: INTERVENTIONS:1. Administer medication as  ordered2. Assist with reality testing to support increasing orientation3. Assess if patient's hallucinations or delusions are encouraging self harm or harm to others and intervene as appropriate  Outcome: Not Progressing     
  Problem: Self Care Deficit  Goal: Return ADL status to a safe level of function  Description: INTERVENTIONS:1. Administer medication as ordered2. Assess ADL deficits and provide assistive devices as needed3. Obtain PT/OT consults as needed4. Assist and instruct patient to increase activity and self care as tolerated  6/15/2025 0036 by Agueda Ramos, RN  Outcome: Not Progressing     
  Problem: Self Harm/Suicidality  Goal: Will have no self-injury during hospital stay  Description: INTERVENTIONS:1.  Ensure constant observer at bedside with Q15M safety checks2.  Maintain a safe environment3.  Secure patient belongings4.  Ensure family/visitors adhere to safety recommendations5.  Ensure safety tray has been added to patient's diet order6.  Every shift and PRN: Re-assess suicidal risk via Frequent Screener  6/11/2025 0743 by Chaparrita Ambriz RN  Outcome: Progressing  6/10/2025 2215 by Ping Kumar RN  Outcome: Progressing     Problem: Anxiety  Goal: Will report anxiety at manageable levels  Description: INTERVENTIONS:1. Administer medication as ordered2. Teach and rehearse alternative coping skills3. Provide emotional support with 1:1 interaction with staff  6/11/2025 0743 by Chaparrita Ambriz RN  Outcome: Progressing  6/10/2025 2215 by Ping Kumar RN  Outcome: Not Progressing     Problem: Psychosis  Goal: Will report no hallucinations or delusions  Description: INTERVENTIONS:1. Administer medication as  ordered2. Assist with reality testing to support increasing orientation3. Assess if patient's hallucinations or delusions are encouraging self harm or harm to others and intervene as appropriate  6/10/2025 2215 by Ping Kumar RN  Outcome: Not Progressing     Problem: Anxiety  Goal: Will report anxiety at manageable levels  Description: INTERVENTIONS:1. Administer medication as ordered2. Teach and rehearse alternative coping skills3. Provide emotional support with 1:1 interaction with staff  6/11/2025 0743 by Chaparrita Ambriz RN  Outcome: Progressing  6/10/2025 2215 by Ping Kumar RN  Outcome: Not Progressing     
  Problem: Self Harm/Suicidality  Goal: Will have no self-injury during hospital stay  Description: INTERVENTIONS:1.  Ensure constant observer at bedside with Q15M safety checks2.  Maintain a safe environment3.  Secure patient belongings4.  Ensure family/visitors adhere to safety recommendations5.  Ensure safety tray has been added to patient's diet order6.  Every shift and PRN: Re-assess suicidal risk via Frequent Screener  6/18/2025 0022 by Ora Healy RN  Outcome: Progressing       Problem: Faye  Goal: Will exhibit normal sleep and speech and no impulsivity  Description: INTERVENTIONS:1. Administer medication as ordered2. Set limits on impulsive behavior3. Make attempts to decrease external stimuli as possible  Outcome: Progressing     Problem: Psychosis  Goal: Will report no hallucinations or delusions  Description: INTERVENTIONS:1. Administer medication as  ordered2. Assist with reality testing to support increasing orientation3. Assess if patient's hallucinations or delusions are encouraging self harm or harm to others and intervene as appropriate  Outcome: Progressing     
  Problem: Self Harm/Suicidality  Goal: Will have no self-injury during hospital stay  Description: INTERVENTIONS:1.  Ensure constant observer at bedside with Q15M safety checks2.  Maintain a safe environment3.  Secure patient belongings4.  Ensure family/visitors adhere to safety recommendations5.  Ensure safety tray has been added to patient's diet order6.  Every shift and PRN: Re-assess suicidal risk via Frequent Screener  6/19/2025 2223 by Ora Healy RN  Outcome: Progressing    Problem: Faye  Goal: Will exhibit normal sleep and speech and no impulsivity  Description: INTERVENTIONS:1. Administer medication as ordered2. Set limits on impulsive behavior3. Make attempts to decrease external stimuli as possible  Outcome: Progressing     Problem: Psychosis  Goal: Will report no hallucinations or delusions  Description: INTERVENTIONS:1. Administer medication as  ordered2. Assist with reality testing to support increasing orientation3. Assess if patient's hallucinations or delusions are encouraging self harm or harm to others and intervene as appropriate  Outcome: Progressing     
  Problem: Self Harm/Suicidality  Goal: Will have no self-injury during hospital stay  Description: INTERVENTIONS:1.  Ensure constant observer at bedside with Q15M safety checks2.  Maintain a safe environment3.  Secure patient belongings4.  Ensure family/visitors adhere to safety recommendations5.  Ensure safety tray has been added to patient's diet order6.  Every shift and PRN: Re-assess suicidal risk via Frequent Screener  6/9/2025 2216 by Chaparrita Ambriz, RN  Outcome: Progressing  6/9/2025 1019 by Betty Gerber, RN  Outcome: Progressing     
  Problem: Self Harm/Suicidality  Goal: Will have no self-injury during hospital stay  Description: INTERVENTIONS:1.  Ensure constant observer at bedside with Q15M safety checks2.  Maintain a safe environment3.  Secure patient belongings4.  Ensure family/visitors adhere to safety recommendations5.  Ensure safety tray has been added to patient's diet order6.  Every shift and PRN: Re-assess suicidal risk via Frequent Screener  Outcome: Progressing     Problem: Depression  Goal: Will be euthymic at discharge  Description: INTERVENTIONS:1. Administer medication as ordered2. Provide emotional support via 1:1 interaction with staff3. Encourage involvement in milieu/groups/activities4. Monitor for social isolation  Outcome: Progressing     Problem: Faye  Goal: Will exhibit normal sleep and speech and no impulsivity  Description: INTERVENTIONS:1. Administer medication as ordered2. Set limits on impulsive behavior3. Make attempts to decrease external stimuli as possible  Outcome: Progressing     Problem: Psychosis  Goal: Will report no hallucinations or delusions  Description: INTERVENTIONS:1. Administer medication as  ordered2. Assist with reality testing to support increasing orientation3. Assess if patient's hallucinations or delusions are encouraging self harm or harm to others and intervene as appropriate  Outcome: Progressing     Problem: Behavior  Goal: Pt/Family maintain appropriate behavior and adhere to behavioral management agreement, if implemented  Description: INTERVENTIONS:1. Assess patient/family's coping skills and  non-compliant behavior (including use of illegal substances)2. Notify security of behavior or suspected illegal substances which indicate the need for search of the family and/or belongings3. Encourage verbalization of thoughts and concerns in a socially appropriate manner4. Utilize positive, consistent limit setting strategies supporting safety of patient, staff and others5. Encourage 
  Problem: Self Harm/Suicidality  Goal: Will have no self-injury during hospital stay  Description: INTERVENTIONS:1.  Ensure constant observer at bedside with Q15M safety checks2.  Maintain a safe environment3.  Secure patient belongings4.  Ensure family/visitors adhere to safety recommendations5.  Ensure safety tray has been added to patient's diet order6.  Every shift and PRN: Re-assess suicidal risk via Frequent Screener  Outcome: Progressing     Problem: Psychosis  Goal: Will report no hallucinations or delusions  Description: INTERVENTIONS:1. Administer medication as  ordered2. Assist with reality testing to support increasing orientation3. Assess if patient's hallucinations or delusions are encouraging self harm or harm to others and intervene as appropriate  Outcome: Not Progressing     Problem: Anxiety  Goal: Will report anxiety at manageable levels  Description: INTERVENTIONS:1. Administer medication as ordered2. Teach and rehearse alternative coping skills3. Provide emotional support with 1:1 interaction with staff  Outcome: Not Progressing     
Pt received at 1930, Alert, Oriented X4, and Withdrawn with Blunt affect. Pt's vital signs were T 97.7 °F (36.5 °C), HR 98, RR 18, /76, and O2 90 %. Pt denied pain, denied anxiety and endorsed nicotine cravings. Pt replied no to suicidal ideation, No to homicidal ideation, and No to hallucinations. Pt had speech that was Clear but with Blocking. Pt provided with 1:1 RN assessment with active listening and therapeutic presence for a brief period in the evening. Pt encouraged to participate in the evening milieu including leisure activities, snack time, and positive peer interaction. Pt cooperated with all scheduled medications and received PRN nicotine lozenge x1 for smoking cessation. Pt monitored q15min throughout the night. Pt was isolative throughout the evening and rested in bed for 8 hours overnight.    Problem: Discharge Planning  Goal: Discharge to home or other facility with appropriate resources  6/23/2025 8415 by Gricel Reyes, RN  Outcome: Not Progressing     Problem: Risk for Elopement  Goal: Patient will not exit the unit/facility without proper excort  Outcome: Progressing     
intervene to maintain adequate nutrition, hydration, elimination, sleep and activity6. If unable to ensure safety without constant attention obtain sitter and review sitter guidelines with assigned personnel7. Initiate Psychosocial CNS and Spiritual Care consult, as indicated  Outcome: Progressing     Problem: Safety - Adult  Goal: Free from fall injury  6/16/2025 0825 by Carmencita Oliver RN  Outcome: Progressing  6/15/2025 2255 by Agueda Ramos RN  Outcome: Progressing     Problem: Pain  Goal: Verbalizes/displays adequate comfort level or baseline comfort level  Outcome: Progressing     Problem: Discharge Planning  Goal: Discharge to home or other facility with appropriate resources  Outcome: Not Progressing     Problem: Depression  Goal: Will be euthymic at discharge  Description: INTERVENTIONS:1. Administer medication as ordered2. Provide emotional support via 1:1 interaction with staff3. Encourage involvement in milieu/groups/activities4. Monitor for social isolation  6/16/2025 0825 by Carmencita Oliver RN  Outcome: Not Progressing  6/15/2025 2255 by Agueda Ramos RN  Outcome: Progressing     Problem: Faye  Goal: Will exhibit normal sleep and speech and no impulsivity  Description: INTERVENTIONS:1. Administer medication as ordered2. Set limits on impulsive behavior3. Make attempts to decrease external stimuli as possible  Outcome: Not Progressing     Problem: Psychosis  Goal: Will report no hallucinations or delusions  Description: INTERVENTIONS:1. Administer medication as  ordered2. Assist with reality testing to support increasing orientation3. Assess if patient's hallucinations or delusions are encouraging self harm or harm to others and intervene as appropriate  6/16/2025 0825 by Carmencita Oliver RN  Outcome: Not Progressing  6/15/2025 2255 by Agueda Ramos RN  Outcome: Progressing     Problem: Anxiety  Goal: Will report anxiety at manageable levels  Description: INTERVENTIONS:1. Administer medication as

## 2025-06-24 NOTE — PROGRESS NOTES
[facility]: MED HISTORY    Comments/Recommendations:   Source: RX Query refill history, [facility]  Unable to speak with patient due to current condition. Please interpret list with caution.  Per [facility], patient last picked up depakote and hydroxyzine on 5/14/25. Last picked up diazepam 4/1/25. Patient did not  trazodone during last fill per RX Query on 5/14/25 so it was not recorded on patient's PTA medication list.   Of note, per chart review, patient was previously at [facility] 5/2024 and patient was discharged on:  Divalproex ER 1000 mg BID (VPA level 81)  Haloperidol 5 mg BID  Quetiapine 600 mg QHS (patient historically has been stable on 2 antipsychotics)      Prior to Admission Medications   Prescriptions Last Dose Informant   diazePAM (VALIUM) 10 MG tablet Unknown Outside Pharmacy/PCP   Sig: Take 1 tablet by mouth 2 times daily as needed for Anxiety (severe anxiety).   divalproex (DEPAKOTE) 500 MG DR tablet Unknown Outside Pharmacy/PCP   Sig: Take 1 tablet by mouth in the morning and at bedtime   hydrOXYzine HCl (ATARAX) 50 MG tablet Unknown Outside Pharmacy/PCP   Sig: Take 1 tablet by mouth daily as needed for Anxiety      Facility-Administered Medications: None             Natalie Butler RPH  Contact: [phone]      
Evening assessment complete.  Patient was encountered resting in her room.  Patient was calm and cooperative with assessment.  Eye contact is noted to be fair.  Patient appears flat.  Mood is noted to be anhedonic with a blunted affect.  Patient endorsed a little anxiety about having pain in her chest overnight where they incised a cyst today.  Patient currently reports that there are no signs or symptoms of depression, also denies all SI/HI/AVH.  C-SSRS level is No Risk.  Patient has been isolative to her room throughout the evening.  Patient is med compliant. There are no untoward side effects from medications to note.  Fifteen minute rounds are being completed to assure patient safety and attend to care needs. Will continue to monitor for safety.        Patient appeared to sleep 8 hours.      
Evening assessment complete.  Patient was encountered standing in the conklin talking on the phone.  Patient was calm and cooperative with assessment.  Eye contact is noted to be fair.  Patient appears flat.  Mood is noted to be anhedonic with a blunted affect.  Patient currently reports that there are no signs or symptoms of depression or anxiety, also denies all SI/HI/AVH.  C-SSRS level is No Risk.  Patient states that that she feels \"on edge\" and her agitation is at a level of 5.  PRN Haldol administered.  Patient has been isolative to her room (except for talking on the phone) throughout the shift.  Patient is med compliant. There are no untoward side effects from medications to note.  Fifteen minute rounds are being completed to assure patient safety and attend to care needs. Will continue to monitor for safety.        Patient appeared to sleep 8 hours.      
Laboratory Monitoring for Mood Stabilizers and Antipsychotics    Recommended baseline monitoring has been completed based on this patient's current medication regimen.     This patient is currently prescribed the following medication(s):   Current Facility-Administered Medications: divalproex (DEPAKOTE) DR tablet 1,000 mg, 1,000 mg, Oral, BID  lurasidone (LATUDA) tablet 80 mg, 80 mg, Oral, Dinner  melatonin disintegrating tablet 10 mg, 10 mg, Oral, Nightly  metFORMIN (GLUCOPHAGE) tablet 500 mg, 500 mg, Oral, BID WC  acetaminophen (TYLENOL) tablet 650 mg, 650 mg, Oral, Q4H PRN  polyethylene glycol (GLYCOLAX) packet 17 g, 17 g, Oral, Daily PRN  aluminum & magnesium hydroxide-simethicone (MAALOX PLUS) 200-200-20 MG/5ML suspension 30 mL, 30 mL, Oral, Q6H PRN  hydrOXYzine HCl (ATARAX) tablet 50 mg, 50 mg, Oral, TID PRN  haloperidol (HALDOL) tablet 5 mg, 5 mg, Oral, Q4H PRN **OR** haloperidol lactate (HALDOL) injection 5 mg, 5 mg, IntraMUSCular, Q4H PRN  diphenhydrAMINE (BENADRYL) injection 50 mg, 50 mg, IntraMUSCular, Q4H PRN  nicotine polacrilex (COMMIT) lozenge 2 mg, 2 mg, Oral, Q1H PRN      The following labs have been completed for monitoring of antipsychotics and/or mood stabilizers:    Height, Weight, BMI Estimation  Estimated body mass index is 46.21 kg/m² as calculated from the following:    Height as of this encounter: 1.7 m (5' 6.93\").    Weight as of this encounter: 133.5 kg (294 lb 6.4 oz).     Vital Signs/Blood Pressure  /65   Pulse 69   Temp 98.1 °F (36.7 °C) (Oral)   Resp 16   Ht 1.7 m (5' 6.93\")   Wt 133.5 kg (294 lb 6.4 oz)   SpO2 98%   BMI 46.21 kg/m²     Renal Function, Hepatic Function and Chemistry  Estimated Creatinine Clearance: 173 mL/min (based on SCr of 0.67 mg/dL).    Lab Results   Component Value Date/Time     06/10/2025 05:25 AM    K 4.2 06/10/2025 05:25 AM     06/10/2025 05:25 AM    CO2 24 06/10/2025 05:25 AM    ANIONGAP 9 06/10/2025 05:25 AM    GLUCOSE 102 
Laboratory Monitoring for Valproic Acid    This patient is currently prescribed the following medication(s):   Current Facility-Administered Medications: acetaminophen (TYLENOL) tablet 1,000 mg, 1,000 mg, Oral, Q6H PRN  lurasidone (LATUDA) tablet 120 mg, 120 mg, Oral, Dinner  divalproex (DEPAKOTE) DR tablet 1,000 mg, 1,000 mg, Oral, BID  melatonin disintegrating tablet 10 mg, 10 mg, Oral, Nightly  metFORMIN (GLUCOPHAGE) tablet 500 mg, 500 mg, Oral, BID WC  polyethylene glycol (GLYCOLAX) packet 17 g, 17 g, Oral, Daily PRN  aluminum & magnesium hydroxide-simethicone (MAALOX PLUS) 200-200-20 MG/5ML suspension 30 mL, 30 mL, Oral, Q6H PRN  hydrOXYzine HCl (ATARAX) tablet 50 mg, 50 mg, Oral, TID PRN  haloperidol (HALDOL) tablet 5 mg, 5 mg, Oral, Q4H PRN **OR** haloperidol lactate (HALDOL) injection 5 mg, 5 mg, IntraMUSCular, Q4H PRN  diphenhydrAMINE (BENADRYL) injection 50 mg, 50 mg, IntraMUSCular, Q4H PRN  nicotine polacrilex (COMMIT) lozenge 2 mg, 2 mg, Oral, Q1H PRN    The following labs have been completed for monitoring of valproic acid:    Valproic Acid Serum Concentration  Lab Results   Component Value Date/Time    VALAC 87 06/19/2025 05:21 AM         Hepatic Function  Lab Results   Component Value Date/Time    BILITOT 0.6 06/08/2025 04:39 AM    GLOB 3.3 06/08/2025 04:39 AM    ALBUMIN 3.0 06/08/2025 04:39 AM    ALT 72 06/08/2025 04:39 AM    AST 49 06/08/2025 04:39 AM    ALKPHOS 63 06/08/2025 04:39 AM    ALKPHOS 52 02/25/2022 10:07 PM       Hematology  Lab Results   Component Value Date/Time    WBC 13.9 06/08/2025 04:39 AM    RBC 4.43 06/08/2025 04:39 AM    HGB 13.8 06/08/2025 04:39 AM    HCT 42.8 06/08/2025 04:39 AM    MCV 96.6 06/08/2025 04:39 AM    MCH 31.2 06/08/2025 04:39 AM    MCHC 32.2 06/08/2025 04:39 AM    RDW 12.8 06/08/2025 04:39 AM     06/08/2025 04:39 AM       Assessment/Plan:  Valproic acid level obtained on 6/19 within therapeutic limits at 87 mcg/mL (range  mcg/mL) on dose of Divalproex 
Laboratory Monitoring for Valproic Acid    This patient is currently prescribed the following medication(s):   Current Facility-Administered Medications: lurasidone (LATUDA) tablet 80 mg, 80 mg, Oral, Dinner  melatonin disintegrating tablet 10 mg, 10 mg, Oral, Nightly  divalproex (DEPAKOTE) DR tablet 500 mg, 500 mg, Oral, BID  metFORMIN (GLUCOPHAGE) tablet 500 mg, 500 mg, Oral, BID WC  acetaminophen (TYLENOL) tablet 650 mg, 650 mg, Oral, Q4H PRN  polyethylene glycol (GLYCOLAX) packet 17 g, 17 g, Oral, Daily PRN  aluminum & magnesium hydroxide-simethicone (MAALOX PLUS) 200-200-20 MG/5ML suspension 30 mL, 30 mL, Oral, Q6H PRN  hydrOXYzine HCl (ATARAX) tablet 50 mg, 50 mg, Oral, TID PRN  haloperidol (HALDOL) tablet 5 mg, 5 mg, Oral, Q4H PRN **OR** haloperidol lactate (HALDOL) injection 5 mg, 5 mg, IntraMUSCular, Q4H PRN  diphenhydrAMINE (BENADRYL) injection 50 mg, 50 mg, IntraMUSCular, Q4H PRN  nicotine polacrilex (COMMIT) lozenge 2 mg, 2 mg, Oral, Q1H PRN    The following labs have been completed for monitoring of valproic acid:    Valproic Acid Serum Concentration  Lab Results   Component Value Date/Time    VALAC 59 06/13/2025 04:41 AM         Hepatic Function  Lab Results   Component Value Date/Time    BILITOT 0.6 06/08/2025 04:39 AM    GLOB 3.3 06/08/2025 04:39 AM    ALBUMIN 3.0 06/08/2025 04:39 AM    ALT 72 06/08/2025 04:39 AM    AST 49 06/08/2025 04:39 AM    ALKPHOS 63 06/08/2025 04:39 AM    ALKPHOS 52 02/25/2022 10:07 PM       Hematology  Lab Results   Component Value Date/Time    WBC 13.9 06/08/2025 04:39 AM    RBC 4.43 06/08/2025 04:39 AM    HGB 13.8 06/08/2025 04:39 AM    HCT 42.8 06/08/2025 04:39 AM    MCV 96.6 06/08/2025 04:39 AM    MCH 31.2 06/08/2025 04:39 AM    MCHC 32.2 06/08/2025 04:39 AM    RDW 12.8 06/08/2025 04:39 AM     06/08/2025 04:39 AM       Assessment/Plan:  Valproic acid level obtained on 6/13  is within therapeutic limits at 59 mcg/mL (range  mcg/mL) on dose of divalproex DR 
PSYCHIATRIC PROGRESS NOTE    Chief Complaint:  I want to go home soon.     Length of Stay: 5 Days    Interval History:  6/13 - Ms. Montilla is unchanged. She remains malodorous, isolative and does not interact with peers or staff. Her delusions persist although they are less prominent. Depakote level was 59. She has been on a dose of 1000mg BID in the past. Her delusions are present but not as prominent and seem more circumscribed. Says she is feeling calmer and has not been loud or agitated. At the present time the patient Ysabel Montilla remains compliant with taking medications. Denies any adverse events from taking them and feels they have been beneficial. She agrees to have finger stick glucose done twice a day.       6/12 - Ms. Montilla continues to do poorly. She insists on being called by the name of a famous rapper and gets irritable when she is called by her first name. She has remained compliant with taking her medications though. 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 However she is less aggressive and less hyper verbal. Compliant with taking her medications and denies any adverse events from them.     6/11 - Ms. Montilla is doing poorly. 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 She refuses finger sticks and expresses anxiety. She is hyper verbal in the milieu but has not been agitated or aggressive. She denies racing thoughts to me. Her delusions are less prominent today but did talk about how she communicates with the [employer] using a \"Thatcherism\". Her level of activity has reduced today. Compliant with taking her medications.     6/10 - Ms. Montilla is somewhat better today. She refused her finger sticks and thinks she does not have diabetes. Her fingers sticks were 237 and A1c was 6.8. Nursing staff report that she has been somewhat calmer today and has not been agitated or aggressive. She is grandiose talking about her association with the [employer], [employer] and [employer]. 
PSYCHIATRIC PROGRESS NOTE    Chief Complaint:  I want to go home soon.     Length of Stay: 6 Days    Interval History:  6/14- Patient is not forthcoming and she avoids eye contact. Tells me she is OK. Denies sleep disturbance.  Denies SI/HI.  Denies AH/VH.  Says she is not supposed to be in hospital.    6/13 - Ms. Montlila is unchanged. She remains malodorous, isolative and does not interact with peers or staff. Her delusions persist although they are less prominent. Depakote level was 59. She has been on a dose of 1000mg BID in the past. Her delusions are present but not as prominent and seem more circumscribed. Says she is feeling calmer and has not been loud or agitated. At the present time the patient Ysabel Montilla remains compliant with taking medications. Denies any adverse events from taking them and feels they have been beneficial. She agrees to have finger stick glucose done twice a day.       6/12 - Ms. Montilla continues to do poorly. She insists on being called by the name of a famous rapper and gets irritable when she is called by her first name. She has remained compliant with taking her medications though. 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 However she is less aggressive and less hyper verbal. Compliant with taking her medications and denies any adverse events from them.     6/11 - Ms. Montilla is doing poorly. 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 She refuses finger sticks and expresses anxiety. She is hyper verbal in the milieu but has not been agitated or aggressive. She denies racing thoughts to me. Her delusions are less prominent today but did talk about how she communicates with the [facility] using a \"Thatcherism\". Her level of activity has reduced today. Compliant with taking her medications.     6/10 - Ms. Montilla is somewhat better today. She refused her finger sticks and thinks she does not have diabetes. Her fingers sticks were 237 and A1c 
PSYCHIATRIC PROGRESS NOTE    Chief Complaint:  I want to go home soon.     Length of Stay: 7 Days    Interval History:  6/15  Patient reports she is doing well.  Reports poor sleep. Denies appetite disturbance.  Denies SI/HI.  Denies psychotic symptoms.      6/14- Patient is not forthcoming and she avoids eye contact. Tells me she is OK. Denies sleep disturbance.  Denies SI/HI.  Denies AH/VH.  Says she is not supposed to be in hospital.    6/13 - Ms. Montilla is unchanged. She remains malodorous, isolative and does not interact with peers or staff. Her delusions persist although they are less prominent. Depakote level was 59. She has been on a dose of 1000mg BID in the past. Her delusions are present but not as prominent and seem more circumscribed. Says she is feeling calmer and has not been loud or agitated. At the present time the patient Ysabel Montilla remains compliant with taking medications. Denies any adverse events from taking them and feels they have been beneficial. She agrees to have finger stick glucose done twice a day.       6/12 - Ms. Montilla continues to do poorly. She insists on being called by the name of a famous rapper and gets irritable when she is called by her first name. She has remained compliant with taking her medications though. 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 However she is less aggressive and less hyper verbal. Compliant with taking her medications and denies any adverse events from them.     6/11 - Ms. Montilla is doing poorly. 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 She refuses finger sticks and expresses anxiety. She is hyper verbal in the milieu but has not been agitated or aggressive. She denies racing thoughts to me. Her delusions are less prominent today but did talk about how she communicates with the [facility] using a \"Thatcherism\". Her level of activity has reduced today. Compliant with taking her medications.     6/10 - MsJesus 
PSYCHIATRIC PROGRESS NOTE    Chief Complaint:  [facility] is staying in touch.     Length of Stay: 4 Days    Interval History:  6/12 - Ms. Montilla continues to do poorly. She insists on being called by the name of a famous rapper and gets irritable when she is called by her first name. She has remained compliant with taking her medications though. 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 However she is less aggressive and less hyper verbal. Compliant with taking her medications and denies any adverse events from them.     6/11 - Ms. Montilla is doing poorly. 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 She refuses finger sticks and expresses anxiety. She is hyper verbal in the milieu but has not been agitated or aggressive. She denies racing thoughts to me. Her delusions are less prominent today but did talk about how she communicates with the [employer] using a \"Thatcherism\". Her level of activity has reduced today. Compliant with taking her medications.     6/10 - Ms. Montilla is somewhat better today. She refused her finger sticks and thinks she does not have diabetes. Her fingers sticks were 237 and A1c was 6.8. Nursing staff report that she has been somewhat calmer today and has not been agitated or aggressive. She is grandiose talking about her association with the [facility], [employer] and [employer]. Says she is getting every 15 minutes psychological reviews by these agencies through special machines and that is how she knows she does not have Bipolar disorder. She has been compliant with taking her psychotropic medications.        Past Medical History:  Past Medical History:   Diagnosis Date    ADHD (attention deficit hyperactivity disorder)     Anxiety     Bipolar affective disorder (HCC)     Bipolar disorder (HCC) 12/12/2013    Depression     Hypertension     Obesity     Supervision of normal first pregnancy 11/14/2013    CF with TS, n/s NT, growth US 32-34wk, tobacco use, prefers to avoid 
Pharmacist Discharge Medication Reconciliation    Discharging Provider: Dr. Taveras    Discharge Medications:   Current Discharge Medication List        START taking these medications    Details   metFORMIN (GLUCOPHAGE) 500 MG tablet Take 1 tablet by mouth 2 times daily (with meals)  Qty: 60 tablet, Refills: 1      lurasidone (LATUDA) 80 MG TABS tablet Take 2 tablets by mouth Daily with supper  Qty: 60 tablet, Refills: 1      melatonin 5 MG TBDP disintegrating tablet Take 2 tablets by mouth nightly  Qty: 60 tablet, Refills: 1           CONTINUE these medications which have CHANGED    Details   diazePAM (VALIUM) 5 MG tablet Take 1 tablet by mouth 2 times daily as needed for Anxiety (Take for anxiety if hydroxyzine isn't effective after 30 minutes.) for up to 60 days. Max Daily Amount: 10 mg  Qty: 60 tablet, Refills: 1    Associated Diagnoses: Schizoaffective disorder, bipolar type (HCC)      hydrOXYzine HCl (ATARAX) 50 MG tablet Take 1 tablet by mouth 2 times daily as needed for Anxiety  Qty: 60 tablet, Refills: 1      divalproex (DEPAKOTE) 500 MG DR tablet Take 2 tablets by mouth in the morning and at bedtime  Qty: 120 tablet, Refills: 1             The patient's chart, MAR and AVS were reviewed by Kemi Crisostomo RPH.    
Pt screened per LOS policy.  No acute nutrition or weight issues identified.  Pt meal compliant.  Would benefit from CC diet with 4 CHO choices per meal tray.  She is occasionally refusing finger sticks and says she doesn't have diabetes, A1c 6.7, , BMI 46.21.  Please consider changing diet when pt more amenable.  Extra protein and vegetables ordered for meal trays to help meet ~ needs @ [facility].  Ht: 5'6.93\"  Wt: 294 lb 6.4 oz  BMI: 46.21 kg/(m^2) c/w obesity class III (morbid)  Est energy needs (AB[facility]): 1955 kcal, 74 g protein, 2350 mL fluids  Pt will consume > 75% of meals at follow up 7-10 days  LOS  
RN Shift Assessment Note:    Ysabel was observed to be isolative to her room. Pt was met in her room laying awake in bed. Pt was alert and oriented, calm and pleasant upon approach. Pt stated her mood was good. Pt denies pain, SI, HI, AVH, anxiety and depression. Pt stated her appetite and sleep was good. Pt's LBM was today. Pt requested a nicotine lozenge. Pt did not have any complaints or adverse reactions to medications. Pt's BP was elevated, 142/99, but she refused to have her blood pressure retaken. Pt will continue to be monitored for her health and safety. Pt slept for a approximately 8 hrs.   
Spiritual Health History and Assessment/Progress Note  [facility]    Spiritual/Emotional Needs, Behavioral Health,  ,  , Initial Encounter    Name: Ysabel Montilla MRN: [id number]    Age: 29 y.o.     Sex: female   Language: English   Samaritan: None   Bipolar 1 disorder (HCC)     Date: 6/12/2025            Total Time Calculated: 7 min              Spiritual Assessment began in [facility] ACUTE BEHAV HLTH            Encounter Overview/Reason: Spiritual/Emotional Needs, Behavioral Health  Service Provided For: Patient    Ping, Belief, Meaning:   Patient is connected with a ping tradition or spiritual practice and has beliefs or practices that help with coping during difficult times  Family/Friends No family/friends present      Importance and Influence:  Patient has no beliefs influential to healthcare decision-making identified during this visit  Family/Friends No family/friends present    Community:  Patient Other: unsure  Family/Friends No family/friends present    Assessment and Plan of Care:     Patient Interventions include: Facilitated expression of thoughts and feelings and Explored spiritual coping/struggle/distress  Family/Friends Interventions include: No family/friends present    Patient Plan of Care: Spiritual Care available upon further referral  Family/Friends Plan of Care: No family/friends present    Electronically signed by ERIC Aguilar on 6/12/2025 at 2:34 PM  
personnel. In addition, the hospital records show that services furnished were intensive treatment services, admission or related services, or equivalent services.      
staff notes, nurses and tech notes, labs and vitals.    I certify that this patients inpatient psychiatric hospital services furnished since the previous certification were, and continue to be, required for treatment that could reasonably be expected to improve the patient's condition, or for diagnostic study, and that the patient continues to need, on a daily basis, active treatment furnished directly by or requiring the supervision of inpatient psychiatric facility personnel. In addition, the hospital records show that services furnished were intensive treatment services, admission or related services, or equivalent services.

## 2025-06-25 NOTE — DISCHARGE SUMMARY
PSYCHIATRIC DISCHARGE SUMMARY         IDENTIFICATION:    Patient Name  Ysabel Montilla   Date of Birth 1995   [facility] [id number]   Medical Record Number  [id number]      Age  29 y.o.   PCP Bam Campos MD   Admit date:  6/8/2025    Discharge date: 6/24/2025   Room Number  311/02  @ [facility]   Date of Service  6/24/2025            TYPE OF DISCHARGE: REGULAR               CONDITION AT DISCHARGE: Improved and Fair       PROVISIONAL & DISCHARGE DIAGNOSES:        Active Hospital Problems    Abscess      *Bipolar 1 disorder (HCC)        DISCHARGE DIAGNOSIS:   Axis I:  SEE ABOVE  Axis II: SEE ABOVE  Axis III: SEE ABOVE  Axis IV:  lack of structure  Axis V:  20 on admission, 70 on discharge     HISTORY OF PRESENT ILLNESS:  \"Psychosis\"    The patient is a 29-year-old  female who is currently admitted after she came to the ER requesting help for pain. She had been bizarre, expressing grandiose delusions and was dressed in a bathing suit. 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 She has gotten several PRNs because of her labile mood and when I attempted to interview her, she was sleeping in bed. She is quite drowsy and multiple attempts to stimulate her to wake up are unsuccessful. She answered some of my questions. She reports that she still has foot pain for which she came to the ER and that she needs treatment for ADHD. 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 She denies that she has mental illness and is a reluctant historian. She went off to sleep several times during the interview and eventually the interview had to be terminated because of her lack of response. Nursing staff reported that she is grandiose and hyperverbal in the milieu.     06/23 - no acute overnight events. The patient has been isolative, malodorous and internally preoccupied. She states she feels fine on interview, and slept 7.5 hours

## 2025-06-25 NOTE — BH NOTE
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 symptoms of depression or anxiety,also denies all SI/HI, AVH.   Patient is both med and meal compliant. There are no untoward side effects from medications to note.      C-SSRS level is low risk.    Hourly rounds are being completed to assure patient safety and attend to care needs. Monitoring will continue for changes in patient condition     
Morning assessment complete. Patient was encountered quietly resting in bed.   Patient is calm and cooperative. Pt A&O x 4. Speech is normal, Ambulation is  normal. Denies any concerns.   Eye contact is noted to be fair.   Patient reports that last nights sleep was good.   Mood is noted to be depressed. Affect is flat.   Patient has been withdrawn and unmotivated.   VS are stable.  Patient currently reports that there are no signs or symptoms of depression or anxiety,also denies all SI/HI, AVH. Denies any need for PRN at this time. No behavior issues presented. Remain in her room most of the shift.     C-SSRS level is low risk.    Hourly rounds are being completed to assure patient safety and attend to care needs. Monitoring will continue for changes in patient condition     
Morning assessment complete. Patient was encountered quietly siting in bed.   Patient is calm and cooperative. Pt A&O x 3. Speech is normal, Ambulation is normal..   Eye contact is noted to be fair.   Patient reports that last nights sleep was good.   Mood is noted to be depressed. Affect is flat but reactive.   Patient has been isolative. Unmotivated to participate with any therapy or adl.  VS are stable.  Patient currently reports that there are no signs or symptoms of depression or anxiety,also denies all SI/HI, AVH.   Patient is both med and meal compliant. There are no untoward side effects from medications to note.      C-SSRS level is low risk.    Hourly rounds are being completed to assure patient safety and attend to care needs. Monitoring will continue for changes in patient condition     
Morning assessment complete. Patient was encountered quietly sitting in bed.    Patient is calm and cooperative.  Pt A&O x 4. Speech is normal, Ambulation is normal.  Eye contact is noted to be fair.   Patient reports that last nights sleep was good.   Mood is noted to be depressed. Affect is flat.   Patient is unmotivated., poor hygiene and no interaction with peers. Stays in bed most of the day.   VS are stable.  Patient currently reports that there are no signs or symptoms of depression or anxiety,also denies all SI/HI, AVH.   Patient is both med and meal compliant. There are no untoward side effects from medications to note. Requested something for anxiety `10/10, Haldol and atarax PRN given. Patient states she is \"upset being stuck here\".       C-SSRS level is low risk.    Hourly rounds are being completed to assure patient safety and attend to care needs. Monitoring will continue for changes in patient condition     
Night shift assessment completed.     Patient has been isolative to her room, alert and oriented x 4. Patient is able to communicate needs to staff, and follow redirections. She remains  calm and cooperative. Patient is compliant to medicine and meals..C-SSRS  level rated as no risk.  Patient endorsed anxiety of 4/10, denied  depression, SI, HI and AVH. Affect is flat, mood is anxious. PRN haldol and nicotine lozenge given. Emotional support and encouragement provided. No aggressive behaviors noted and no complaint made.  Q 15 minutes and hourly rounds in progress.  .VS were T (!) 96 °F (35.6 °C), , RR 16, BP (!) 140/81, O2 95 %. Pt slept for the total of 7.5 hours.       
Night shift assessment completed.     Patient is solative, spending most of the time confined to her room, not socializing with peers. Patient is alert and oriented x 4.  Communicate needs to staff, and follow directions. She is calm and cooperative. Med and meals compliant. C-SSRS  level rated as no risk. Patient endorsed feeling better. She endorsed  anxiety of 4/10, denied depression, SI, HI and AVH.  PRN atarax and haldol given.  Emotional support and encouragement provided. No aggressive behaviors noted.  Q 15 minutes and hourly rounds in progress.      Pt slept for the total of 8 hours.  
Night shift assessment completed.    Patient remains  withdrawn to her room, not socializing with peers. Patient presents with flat affect and constricted mood. Eye contact is fair. C-SSR is no risk. No signs of distress observed. Patient endorsed  anxiety of 4/10, denied depression, SI, HI, and AVH. PRN Haldol and Nicotine lozenge  administered. Patient is compliant with meds and meals. No aggressive behavior noted. Emotional support and encouragement provided. Q 15 minutes and hourly rounds in progress. VS were T 98.1 °F (36.7 °C), HR 88, RR 16, /72, O2 95 %. Pt slept for the total of 8 hours.   
Night shift assessment completed.    Writer met patient resting in the bed. Patient is alert, and oriented x 3. Patient is calm, present with grandiose delusion. She endorsed  anxiety, denied depression, SI, HI, and AVH. Patient is compliant with meds and meals. PRN Haldol and Nicotine administered. No aggressive behavior noted. Emotional support and encouragement provided. Q 15 minutes and hourly rounds in progress.  S were T 98.4 °F (36.9 °C), HR 89, RR 16, /70, O2 99 %. Pt slept for the total of 8 hours.   
Night shift assessment completed. 19:30- 07:30      Pt alert and oriented to person, time, place, and situation. Pt encountered in patient room resting quietly in no distress. Pt displays flat affect. depressed mood. Eye contact noted to be fair.      On assessment, vital signs /88 other parameters are unremarkable.     Pt reported having anxiety 4, no depression, no SI/HI, no A/V hallucinations. No pain.      Suicide screening C-SS RS assessment score no risk.      Pt isolative to her room not . Socially not interactive with peers and others.   Pt appeared clean, adhering well with personal hygiene and dressed in clean gown.   Scheduled medication was administered as prescribed, with no untoward effects noted on monitoring.      No prn medication given during this shift.     Emotional support and encouragement was provided via 1:1 interaction.     Hourly rounds and Q 15 minutes checks maintained for care and safety.     Ordered lab work done sample sent to the lab check for results.    Pt appear to have slept for 8 hrs    
Night shift assessment completed. 19:30- 07:30     Pt alert and oriented to person, time, place, and situation. Pt encountered in patient room resting quietly with bilateral even breathing. Pt displays blunt affect. Irritable mood. Eye contact noted to be fair.     On assessment, vital signs /84, other parameters within a normal range.     Pt reported increased anxiety, no depression, no SI/HI, no A/V hallucinations. No pain.     Suicide screening C-SS RS assessment score no risk.     Pt isolative to the room not interactive with others. Pt is malodorous not adhering well with personal hygiene.     Pt compliant with scheduled medication given, with no untoward effects on monitoring.     Prn medication given during this shift atarax for anxiety, and nicotine lozenge for nicotine craving..     Health education on hygiene compliancy, emotional support and encouragement was provided to the patient by the writer.     Hourly rounds and Q 15 minutes checks maintained for care and safety.      Pt appear to have slept for 7.45 hrs.  
PSYCHIATRIC PROGRESS NOTE    Chief Complaint:  \"Psychosis\"    Length of Stay: 10 Days    Interval History:    06/18 - overnight, the patient continued to endorse delusional narrative, stating she was working for the [employer] but she is more coherent today, providing the team her mother's name and number to be contacted ([place] [phone]). General surgery performed an I&D without issue yesterday. Patient slept 8 hours overnight and did not get any PRNs for agitation or aggression. She is medication compliant and oddly related but calm.    06/17 - the patient is unchanged. She remains isolative and malodorous but medication compliant. She slept 8 hours overnight and continues to endorse frankly paranoid and grandiose delusions. She is hesitant to allow MD to contact her family stating they are not actually her family but does give number for grandmother Brea ([phone]). The patient is disorganized and unable to reality test. She was noted to have a chest abscess.     06/16 - the patient has been isolative to her room. She is malodorous and with limited insight into the reason for hospitalization. She was committed at her TDO hearing and received PRN Atarax for anxiety. She continues to state that her name is \"Fetivy Wap\" and is compliant with medication. Patient slept 7.25 hours overnight, she is oddly related and has been unable to provide meaningful information about her circumstances in the months since last hospitalization and prior to this admission.    6/13 - Ms. Montilla is unchanged. She remains malodorous, isolative and does not interact with peers or staff. Her delusions persist although they are less prominent. Depakote level was 59. She has been on a dose of 1000mg BID in the past. Her delusions are present but not as prominent and seem more circumscribed. Says she is feeling calmer and has not been loud or agitated. At the present time the patient Ysabel Montilla remains compliant with taking medications. 
PSYCHIATRIC PROGRESS NOTE    Chief Complaint:  \"Psychosis\"    Length of Stay: 11 Days    Interval History:    06/19 - the patient slept 8 hours overnight. She received no PRNs and remains isolative to her room, with limited ability to make her needs known. She continues to refer to herself as \"Fetty Wap\" and is disorganized on interview. VPA level drawn, therapeutic at 87. Patient discharge focused, per SW her mother is interested in ANDRE options for her prior to discharge, but will allow her to return home. Patient largely unchanged, she is delusional but redirectable.     06/18 - overnight, the patient continued to endorse delusional narrative, stating she was working for the [employer] but she is more coherent today, providing the team her mother's name and number to be contacted ([place] [phone]). General surgery performed an I&D without issue yesterday. Patient slept 8 hours overnight and did not get any PRNs for agitation or aggression. She is medication compliant and oddly related but calm.    06/17 - the patient is unchanged. She remains isolative and malodorous but medication compliant. She slept 8 hours overnight and continues to endorse frankly paranoid and grandiose delusions. She is hesitant to allow MD to contact her family stating they are not actually her family but does give number for grandmother Brea ([phone]). The patient is disorganized and unable to reality test. She was noted to have a chest abscess.     06/16 - the patient has been isolative to her room. She is malodorous and with limited insight into the reason for hospitalization. She was committed at her TDO hearing and received PRN Atarax for anxiety. She continues to state that her name is \"Fetty Wap\" and is compliant with medication. Patient slept 7.25 hours overnight, she is oddly related and has been unable to provide meaningful information about her circumstances in the months since last hospitalization and prior to this 
PSYCHIATRIC PROGRESS NOTE    Chief Complaint:  \"Psychosis\"    Length of Stay: 12 Days    Interval History:      06/20 - overnight, the patient remained isolative to her room. She continues to make illogical statements regarding her involvement in witness protection and her life being in danger due to exposing a child trafficking ring in her neighborhood. She recieved Atarax and Haldol PRN overnight and today states it was helpful for her anxiety. She is unable to reality test regarding returning to her mother's home and the identity of her grandparents, whom she feels took her at birth. She is medication compliant and tolerating Latuda without issue. Patient's mother inquired about starting an agent with ANDRE capabilities.     06/19 - the patient slept 8 hours overnight. She received no PRNs and remains isolative to her room, with limited ability to make her needs known. She continues to refer to herself as \"Fetty Wap\" and is disorganized on interview. VPA level drawn, therapeutic at 87. Patient discharge focused, per SW her mother is interested in ANDRE options for her prior to discharge, but will allow her to return home. Patient largely unchanged, she is delusional but redirectable.     06/18 - overnight, the patient continued to endorse delusional narrative, stating she was working for the [employer] but she is more coherent today, providing the team her mother's name and number to be contacted ([place] [phone]). General surgery performed an I&D without issue yesterday. Patient slept 8 hours overnight and did not get any PRNs for agitation or aggression. She is medication compliant and oddly related but calm.    06/17 - the patient is unchanged. She remains isolative and malodorous but medication compliant. She slept 8 hours overnight and continues to endorse frankly paranoid and grandiose delusions. She is hesitant to allow MD to contact her family stating they are not actually her family but does give number for 
PSYCHIATRIC PROGRESS NOTE    Chief Complaint:  \"Psychosis\"    Length of Stay: 14 Days    Interval History:  6/22 - Ms. Montilla is unchanged. She was sleeping in bed and said \"I don't need anything\". She declined to speak to this author as she feels tired and declined to offer any complaints. No agitation or aggression is noted. She remains compliant with taking her medications.       6/21 - Ms. Montilla remains isolative to her room. Says that \"everything is the same\". Her delusions persist but is less expressive of them. Says she would like to go home \"as soon as possible\". At the present time the patient Ysabel Montilla remains compliant with taking medications. Denies any adverse events from taking them and feels they have been beneficial.       06/20 - overnight, the patient remained isolative to her room. She continues to make illogical statements regarding her involvement in witness protection and her life being in danger due to exposing a child trafficking ring in her neighborhood. She recieved Atarax and Haldol PRN overnight and today states it was helpful for her anxiety. She is unable to reality test regarding returning to her mother's home and the identity of her grandparents, whom she feels took her at birth. She is medication compliant and tolerating Latuda without issue. Patient's mother inquired about starting an agent with [employer] capabilities.     06/19 - the patient slept 8 hours overnight. She received no PRNs and remains isolative to her room, with limited ability to make her needs known. She continues to refer to herself as \"Fetty Wap\" and is disorganized on interview. VPA level drawn, therapeutic at 87. Patient discharge focused, per SW her mother is interested in ANDRE options for her prior to discharge, but will allow her to return home. Patient largely unchanged, she is delusional but redirectable.     06/18 - overnight, the patient continued to endorse delusional narrative, stating she was working for 
PSYCHIATRIC PROGRESS NOTE    Chief Complaint:  \"Psychosis\"    Length of Stay: 15 Days    Interval History:    06/23 - no acute overnight events. The patient has been isolative, malodorous and internally preoccupied. She states she feels fine on interview, and slept 7.5 hours overnight. She continues to frequently request Haldol PRN for anxiety and psychosis. She is tolerating higher dose of Latuda thus far. Per family, she may return home upon discharge.     6/22 - Ms. Montilla is unchanged. She was sleeping in bed and said \"I don't need anything\". She declined to speak to this author as she feels tired and declined to offer any complaints. No agitation or aggression is noted. She remains compliant with taking her medications.     6/21 - Ms. Montilla remains isolative to her room. Says that \"everything is the same\". Her delusions persist but is less expressive of them. Says she would like to go home \"as soon as possible\". At the present time the patient Ysabel Montilla remains compliant with taking medications. Denies any adverse events from taking them and feels they have been beneficial.       06/20 - overnight, the patient remained isolative to her room. She continues to make illogical statements regarding her involvement in witness protection and her life being in danger due to exposing a child trafficking ring in her neighborhood. She recieved Atarax and Haldol PRN overnight and today states it was helpful for her anxiety. She is unable to reality test regarding returning to her mother's home and the identity of her grandparents, whom she feels took her at birth. She is medication compliant and tolerating Latuda without issue. Patient's mother inquired about starting an agent with [employer] capabilities.     06/19 - the patient slept 8 hours overnight. She received no PRNs and remains isolative to her room, with limited ability to make her needs known. She continues to refer to herself as \"Fetty Wap\" and is disorganized on 
PSYCHIATRIC PROGRESS NOTE    Chief Complaint:  \"Psychosis\"    Length of Stay: 8 Days    Interval History:    06/16 - the patient has been isolative to her room. She is malodorous and with limited insight into the reason for hospitalization. She was committed at her TDO hearing and received PRN Atarax for anxiety. She continues to state that her name is \"Sharan Pollard\" and is compliant with medication. Patient slept 7.25 hours overnight, she is oddly related and has been unable to provide meaningful information about her circumstances in the months since last hospitalization and prior to this admission    6/13 - Ms. Montilla is unchanged. She remains malodorous, isolative and does not interact with peers or staff. Her delusions persist although they are less prominent. Depakote level was 59. She has been on a dose of 1000mg BID in the past. Her delusions are present but not as prominent and seem more circumscribed. Says she is feeling calmer and has not been loud or agitated. At the present time the patient Ysabel Montilla remains compliant with taking medications. Denies any adverse events from taking them and feels they have been beneficial. She agrees to have finger stick glucose done twice a day.       6/12 - Ms. Montilla continues to do poorly. She insists on being called by the name of a famous rapper and gets irritable when she is called by her first name. She has remained compliant with taking her medications though. 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 However she is less aggressive and less hyper verbal. Compliant with taking her medications and denies any adverse events from them.     6/11 - Ms. Montilla is doing poorly. 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 She refuses finger sticks and expresses anxiety. She is hyper verbal in the milieu but has not been agitated or aggressive. She denies racing thoughts to me. Her delusions are less prominent today but did 
PSYCHIATRIC PROGRESS NOTE    Chief Complaint:  \"Psychosis\"    Length of Stay: 9 Days    Interval History:    06/17 - the patient is unchanged. She remains isolative and malodorous but medication compliant. She slept 8 hours overnight and continues to endorse frankly paranoid and grandiose delusions. She is hesitant to allow MD to contact her family stating they are not actually her family but does give number for grandmother Brea ([phone]). The patient is disorganized and unable to reality test. She was noted to have a chest abscess.     06/16 - the patient has been isolative to her room. She is malodorous and with limited insight into the reason for hospitalization. She was committed at her TDO hearing and received PRN Atarax for anxiety. She continues to state that her name is \"Fetivy Wap\" and is compliant with medication. Patient slept 7.25 hours overnight, she is oddly related and has been unable to provide meaningful information about her circumstances in the months since last hospitalization and prior to this admission.    6/13 - Ms. Montilla is unchanged. She remains malodorous, isolative and does not interact with peers or staff. Her delusions persist although they are less prominent. Depakote level was 59. She has been on a dose of 1000mg BID in the past. Her delusions are present but not as prominent and seem more circumscribed. Says she is feeling calmer and has not been loud or agitated. At the present time the patient Ysabel Montilla remains compliant with taking medications. Denies any adverse events from taking them and feels they have been beneficial. She agrees to have finger stick glucose done twice a day.       6/12 - Ms. Montilla continues to do poorly. She insists on being called by the name of a famous rapper and gets irritable when she is called by her first name. She has remained compliant with taking her medications though. 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 
PSYCHOSOCIAL ASSESSMENT  :Patient identifying info:   Ysabel Montilla is a 29 y.o. female admitted 6/8/2025 10:05 PM    Presenting problem and precipitating factors:   Pt presented to ED involuntarily  for acute psychosis. Pt endorses increased irritability and poor concentration. Pt reports the the following psychosocial stressors: other psychological and environmental problems have contributed to current condition.     Per chart review: Pt has a history of bipolar affective disorder. Patient presents to ED with foot pain. 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 Patient denies any SI or HI. Patient reports having delusions and episodes of psychosis in the past for inpatient admissions. Patient presented at [facility] in a bikini. Patient has a flight of ideas and stated that she was kidnapped when she was a kid. Patient refuses to be interrupted when sharing delusions. Patient endorsed again that she works in the [employer] for the [employer] and that her vital signs are a lie and that she is being watched. Patient states that she has conspiracy diagnosis which \"everything is backed up by [employer] and [employer]\". Patient responded to many questions of the assessment with, \"that's classified information.\". Patient has had similar delusions and episodes of psychosis in the past. 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 Patient stated, \"I rescued 12 kids you all were in operating with molesting and trafficking you and your people for the past 8 years,\" and, \"I got all my babies back Cecile Acuna stole and I killed her for that. It was a federal operation.\". 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 Patient is observed 
Patient provided with belongings and valuables. Patient reviewed discharge instructions and AVS with staff, verbalized understanding, no questions or concerns noted. Patient denies SI/HI/AVH, no safety concerns to noted. Patient discharged to home via Lyft as arranged by [facility] at 1300 without issue.   
Pt encountered at the nurse's station, asking for anxiety medication.  Pt given PRN Haldol 5 mg for agitation as she was observed RTIS and PRN Atarax 50 mg for anxiety at 1527.  No untoward side effects reported or observed and pt was observed resting quietly in bed.     Pt requested additional PRN intervention for anxiety, as her mother called the unit repeatedly and seemed to upset her.  Pt felt insulted when her mother questioned her reported lack of hygiene.  Pt was given PRN diazepam 5 mg at 1657 and was educated on dosing schedule as second line intervention for anxiety. Pt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